# Patient Record
Sex: FEMALE | Race: BLACK OR AFRICAN AMERICAN | Employment: OTHER | URBAN - METROPOLITAN AREA
[De-identification: names, ages, dates, MRNs, and addresses within clinical notes are randomized per-mention and may not be internally consistent; named-entity substitution may affect disease eponyms.]

---

## 2017-01-03 ENCOUNTER — OFFICE VISIT (OUTPATIENT)
Dept: ENDOCRINOLOGY CLINIC | Facility: CLINIC | Age: 67
End: 2017-01-03

## 2017-01-03 VITALS
WEIGHT: 268 LBS | HEART RATE: 80 BPM | HEIGHT: 59 IN | RESPIRATION RATE: 18 BRPM | BODY MASS INDEX: 54.03 KG/M2 | TEMPERATURE: 98 F | DIASTOLIC BLOOD PRESSURE: 72 MMHG | SYSTOLIC BLOOD PRESSURE: 114 MMHG

## 2017-01-03 DIAGNOSIS — Z23 NEED FOR TETANUS BOOSTER: ICD-10-CM

## 2017-01-03 DIAGNOSIS — E11.40 TYPE 2 DIABETES MELLITUS WITH DIABETIC NEUROPATHY, UNSPECIFIED LONG TERM INSULIN USE STATUS: Primary | ICD-10-CM

## 2017-01-03 PROCEDURE — 90471 IMMUNIZATION ADMIN: CPT | Performed by: NURSE PRACTITIONER

## 2017-01-03 PROCEDURE — 99214 OFFICE O/P EST MOD 30 MIN: CPT | Performed by: NURSE PRACTITIONER

## 2017-01-03 PROCEDURE — 90715 TDAP VACCINE 7 YRS/> IM: CPT | Performed by: NURSE PRACTITIONER

## 2017-01-03 RX ORDER — GLIMEPIRIDE 1 MG/1
1 TABLET ORAL
Qty: 60 TABLET | Refills: 1 | Status: SHIPPED | OUTPATIENT
Start: 2017-01-03 | End: 2017-02-08

## 2017-01-03 RX ORDER — GLIMEPIRIDE 1 MG/1
TABLET ORAL
Qty: 180 TABLET | Refills: 1 | OUTPATIENT
Start: 2017-01-03

## 2017-01-03 NOTE — PROGRESS NOTES
CC: Patient presents with:  Diabetes: new pt referred by PCP.       HISTORY:  Past Medical History   Diagnosis Date   • DIABETES    • OTHER DISEASES      bronchitis   • Lipid screening 4/25/2012   • Esophageal reflux    • Measles without mention of complica Results  Component Value Date   HGBA1C 9.0* 11/28/2016   * 11/28/2016   A1C 6.1* 02/11/2014       Diabetes Type: 2  Duration:20 years   Current Meds: metformin 1000 mg bid    SE: denies   Failed Meds: none     Overall glucose control:poor A1C at 9% (two) times daily. , Disp: 180 capsule, Rfl: 2  •  Nortriptyline HCl 25 MG Oral Cap, TAKE 1 CAPSULE BY MOUTH EVERY NIGHT AT BEDTIME, Disp: 30 capsule, Rfl: 5  •  Zolpidem Tartrate 5 MG Oral Tab, TAKE 1 TABLET BY MOUTH AT BEDTIME, Disp: 30 tablet, Rfl: 2  • Skin is warm and dry. No rash noted. No erythema. No pallor. No open wounds noted. Psychiatric: Normal mood and affect.      Diabetes foot exam: Bilateral barefoot skin diabetic exam is normal, visualized feet and the appearance is normal.  Bilateral monof Depression screen: up to date     Check glucoses 1 times daily - fasting, premeals and/or bedtime. Call/fax/email glucose logs in 30 days. Return in about 4 weeks (around 1/31/2017) for diabetes.     Pt verbalized understanding and has no further ques

## 2017-01-03 NOTE — TELEPHONE ENCOUNTER
LOV-1/3/17 SC  FOV-2/3/17 SC  LAST RX-1/3/17 needs 90 day supply  LAST LABS-11/28/16 a1c-9.0  PER PROTOCOL-to provider.

## 2017-01-03 NOTE — PATIENT INSTRUCTIONS
Continue metformin twice a day  Start glimiperide 1 mg before breakfast and dinner  Test sugar at varied times and record   Go see [facility] education   Return in 4 weeks with readings

## 2017-01-05 ENCOUNTER — NURSE ONLY (OUTPATIENT)
Dept: ENDOCRINOLOGY CLINIC | Facility: CLINIC | Age: 67
End: 2017-01-05

## 2017-01-05 VITALS
DIASTOLIC BLOOD PRESSURE: 91 MMHG | HEART RATE: 92 BPM | WEIGHT: 272 LBS | SYSTOLIC BLOOD PRESSURE: 149 MMHG | BODY MASS INDEX: 55 KG/M2

## 2017-01-05 DIAGNOSIS — E11.40 TYPE 2 DIABETES MELLITUS WITH DIABETIC NEUROPATHY, UNSPECIFIED LONG TERM INSULIN USE STATUS: Primary | ICD-10-CM

## 2017-01-05 PROCEDURE — G0108 DIAB MANAGE TRN  PER INDIV: HCPCS | Performed by: DIETITIAN, REGISTERED

## 2017-01-05 NOTE — PROGRESS NOTES
Harjinder Valdez  : 1950 attended [employer] Diabetic Education:    Date: 2017  Referring MD: BISHNU Sweeney N.P. Start time: 11am End time: 12pm    Date of Dx: 15 yrs ago     /91 mmHg  Pulse 92  Wt 272 lb      HEMOGLOBIN A1C (% of total Hgb) pancreas with increased potential for hypoglycemia.  If dose missed, wait to take with next meal.    Monitoring: Instructed/reinforced blood glucose monitoring, testing schedules and target goals:   Fasting / Pre-meal  2 Hour Post-prandial 140-180  Elton Alessandra

## 2017-01-09 RX ORDER — ACETAMINOPHEN AND CODEINE PHOSPHATE 300; 30 MG/1; MG/1
TABLET ORAL
Qty: 50 TABLET | Refills: 0 | OUTPATIENT
Start: 2017-01-09

## 2017-01-16 ENCOUNTER — TELEPHONE (OUTPATIENT)
Dept: INTERNAL MEDICINE CLINIC | Facility: CLINIC | Age: 67
End: 2017-01-16

## 2017-01-16 RX ORDER — ACETAMINOPHEN AND CODEINE PHOSPHATE 300; 30 MG/1; MG/1
TABLET ORAL
Qty: 50 TABLET | Refills: 0 | Status: SHIPPED | OUTPATIENT
Start: 2017-01-16 | End: 2017-03-31

## 2017-01-18 RX ORDER — DICYCLOMINE HYDROCHLORIDE 10 MG/1
CAPSULE ORAL
Qty: 120 CAPSULE | Refills: 0 | Status: SHIPPED | OUTPATIENT
Start: 2017-01-18 | End: 2017-03-20

## 2017-02-02 ENCOUNTER — TELEPHONE (OUTPATIENT)
Dept: ENDOCRINOLOGY CLINIC | Facility: CLINIC | Age: 67
End: 2017-02-02

## 2017-02-08 DIAGNOSIS — E11.42 TYPE 2 DIABETES MELLITUS WITH DIABETIC POLYNEUROPATHY, UNSPECIFIED LONG TERM INSULIN USE STATUS: Primary | ICD-10-CM

## 2017-02-08 NOTE — TELEPHONE ENCOUNTER
LOV-1/3/17 SC  FOV-2/13/17 SC  LAST RX-1/3/17 60 tabs 1 refill  LAST LABS-11/28/16 a1c-9.0  PER PROTOCOL-to provider.

## 2017-02-09 RX ORDER — GLIMEPIRIDE 1 MG/1
TABLET ORAL
Qty: 180 TABLET | Refills: 1 | Status: SHIPPED | OUTPATIENT
Start: 2017-02-09 | End: 2017-02-24

## 2017-02-16 ENCOUNTER — TELEPHONE (OUTPATIENT)
Dept: INTERNAL MEDICINE CLINIC | Facility: CLINIC | Age: 67
End: 2017-02-16

## 2017-02-16 RX ORDER — PRAVASTATIN SODIUM 20 MG
TABLET ORAL
Qty: 90 TABLET | Refills: 0 | Status: SHIPPED | OUTPATIENT
Start: 2017-02-16 | End: 2017-05-21

## 2017-02-16 RX ORDER — SPIRONOLACTONE 25 MG/1
TABLET ORAL
Qty: 90 TABLET | Refills: 0 | Status: SHIPPED | OUTPATIENT
Start: 2017-02-16 | End: 2017-05-21

## 2017-02-24 ENCOUNTER — OFFICE VISIT (OUTPATIENT)
Dept: ENDOCRINOLOGY CLINIC | Facility: CLINIC | Age: 67
End: 2017-02-24

## 2017-02-24 VITALS
TEMPERATURE: 98 F | HEART RATE: 68 BPM | HEIGHT: 59 IN | RESPIRATION RATE: 18 BRPM | DIASTOLIC BLOOD PRESSURE: 82 MMHG | WEIGHT: 268 LBS | BODY MASS INDEX: 54.03 KG/M2 | SYSTOLIC BLOOD PRESSURE: 124 MMHG

## 2017-02-24 DIAGNOSIS — E11.42 TYPE 2 DIABETES MELLITUS WITH DIABETIC POLYNEUROPATHY, UNSPECIFIED LONG TERM INSULIN USE STATUS: Primary | ICD-10-CM

## 2017-02-24 PROCEDURE — 99214 OFFICE O/P EST MOD 30 MIN: CPT | Performed by: NURSE PRACTITIONER

## 2017-02-24 RX ORDER — LANCETS 33 GAUGE
EACH MISCELLANEOUS
Qty: 100 EACH | Refills: 3 | Status: SHIPPED | OUTPATIENT
Start: 2017-02-24 | End: 2018-04-16

## 2017-02-24 NOTE — PATIENT INSTRUCTIONS
Start Victoza 0.6 mg daily for 2 weeks then increase to 1.2 mg for 2 weeks   When you go to 1.2 mg stop glimiperide   Continue metformin twice a day   Check sugar 1-2 times daily and bring in readings at next visit in 1 month

## 2017-02-25 NOTE — PROGRESS NOTES
CC: Patient presents with:  Diabetes: follow up. pt did not bring meter.       HISTORY:  Past Medical History   Diagnosis Date   • DIABETES    • OTHER DISEASES      bronchitis   • Lipid screening 4/25/2012   • Esophageal reflux    • Measles without mention years.   Diabetes information        Lab Results  Component Value Date   HGBA1C 9.0* 11/28/2016   * 11/28/2016   A1C 6.1* 02/11/2014       Diabetes Type: 2  Duration:20 years   Current Meds: metformin 1000 mg bid, glimepiride 1 mg bid    SE: amrik tablet, Rfl: 0  •  PRAVASTATIN SODIUM 20 MG Oral Tab, TAKE 1 TABLET BY MOUTH EVERY EVENING, Disp: 90 tablet, Rfl: 0  •  DICYCLOMINE HCL 10 MG Oral Cap, TAKE 1 CAPSULE BY MOUTH FOUR TIMES DAILY BEFORE MEALS AND AT BEDTIME, Disp: 120 capsule, Rfl: 0  •  Acet Plan:  Type 2 diabetes mellitus with diabetic neuropathy, unspecified long term insulin use status (hcc)  (primary encounter diagnosis): plan continue metformin 1000 mg bid, change to glimiperide 1 mg daily.  Trial of Victoza 0.6 mg daily for 2 weeks and in

## 2017-02-27 RX ORDER — ZOLPIDEM TARTRATE 5 MG/1
TABLET ORAL
Qty: 30 TABLET | Refills: 0 | Status: SHIPPED | OUTPATIENT
Start: 2017-02-27 | End: 2017-03-31

## 2017-03-04 ENCOUNTER — MED REC SCAN ONLY (OUTPATIENT)
Dept: INTERNAL MEDICINE CLINIC | Facility: CLINIC | Age: 67
End: 2017-03-04

## 2017-03-13 ENCOUNTER — OFFICE VISIT (OUTPATIENT)
Dept: ENDOCRINOLOGY CLINIC | Facility: CLINIC | Age: 67
End: 2017-03-13

## 2017-03-13 VITALS
TEMPERATURE: 98 F | HEART RATE: 88 BPM | SYSTOLIC BLOOD PRESSURE: 118 MMHG | BODY MASS INDEX: 52.61 KG/M2 | DIASTOLIC BLOOD PRESSURE: 72 MMHG | RESPIRATION RATE: 18 BRPM | WEIGHT: 268 LBS | HEIGHT: 60 IN

## 2017-03-13 DIAGNOSIS — E78.00 PURE HYPERCHOLESTEROLEMIA: ICD-10-CM

## 2017-03-13 DIAGNOSIS — E11.42 TYPE 2 DIABETES MELLITUS WITH DIABETIC POLYNEUROPATHY, UNSPECIFIED LONG TERM INSULIN USE STATUS: Primary | ICD-10-CM

## 2017-03-13 LAB
CARTRIDGE LOT#: 673 NUMERIC
HEMOGLOBIN A1C: 7.2 % (ref 4.3–5.6)

## 2017-03-13 PROCEDURE — 83036 HEMOGLOBIN GLYCOSYLATED A1C: CPT | Performed by: NURSE PRACTITIONER

## 2017-03-13 PROCEDURE — 36415 COLL VENOUS BLD VENIPUNCTURE: CPT | Performed by: NURSE PRACTITIONER

## 2017-03-13 PROCEDURE — 99214 OFFICE O/P EST MOD 30 MIN: CPT | Performed by: NURSE PRACTITIONER

## 2017-03-13 NOTE — PROGRESS NOTES
CC: Patient presents with:  Diabetes: follow up. pt did bring meter. HPI:   HPI: 73year old y/o female who presents for follow up diabetes visit.  Has been taking Victoza 1.2 mg for past few weeks, has had decrease in appetite and seen BG improvement is into the skin daily. , Disp: 1 Box, Rfl: 0  •  Liraglutide (VICTOZA) 18 MG/3ML Subcutaneous Solution Pen-injector, Inject under the skin 0.6 mg daily for 2 weeks and then go to 1.2 mg daily for 2 weeks, Disp: 6 mL, Rfl: 0  •  ONETOUCH ULTRA BLUE In [employer] Normal range of motion. Neck supple. Cardiovascular: Normal rate, regular rhythm and intact distal pulses. No murmur, rubs or gallops. Pulmonary/Chest: Effort and breath sounds normal. No respiratory distress.  No wheezes,  rhonchi or rales   Abdominal: bedtime. Call/fax/email glucose logs or f/u in 3 months      Return in about 3 months (around 6/13/2017) for diabetes. Pt verbalized understanding and has no further questions at this time.     Bertha ELIZABETH,LOUISE

## 2017-03-21 RX ORDER — DICYCLOMINE HYDROCHLORIDE 10 MG/1
CAPSULE ORAL
Qty: 120 CAPSULE | Refills: 0 | Status: SHIPPED | OUTPATIENT
Start: 2017-03-21 | End: 2017-05-20

## 2017-03-31 RX ORDER — ZOLPIDEM TARTRATE 5 MG/1
TABLET ORAL
Qty: 30 TABLET | Refills: 0 | Status: SHIPPED | OUTPATIENT
Start: 2017-03-31 | End: 2017-05-05

## 2017-03-31 RX ORDER — ACETAMINOPHEN AND CODEINE PHOSPHATE 300; 30 MG/1; MG/1
TABLET ORAL
Qty: 50 TABLET | Refills: 0 | Status: SHIPPED | OUTPATIENT
Start: 2017-03-31 | End: 2017-06-16

## 2017-05-08 RX ORDER — ZOLPIDEM TARTRATE 5 MG/1
TABLET ORAL
Qty: 30 TABLET | Refills: 0 | Status: SHIPPED | OUTPATIENT
Start: 2017-05-08 | End: 2017-06-05

## 2017-05-15 RX ORDER — OMEPRAZOLE 20 MG/1
CAPSULE, DELAYED RELEASE ORAL
Qty: 90 CAPSULE | Refills: 0 | Status: SHIPPED | OUTPATIENT
Start: 2017-05-15 | End: 2017-08-06

## 2017-05-22 RX ORDER — SPIRONOLACTONE 25 MG/1
TABLET ORAL
Qty: 90 TABLET | Refills: 0 | Status: SHIPPED | OUTPATIENT
Start: 2017-05-22 | End: 2017-08-17

## 2017-05-22 RX ORDER — DICYCLOMINE HYDROCHLORIDE 10 MG/1
CAPSULE ORAL
Qty: 120 CAPSULE | Refills: 0 | Status: SHIPPED | OUTPATIENT
Start: 2017-05-22 | End: 2017-07-22

## 2017-05-22 RX ORDER — PRAVASTATIN SODIUM 20 MG
TABLET ORAL
Qty: 90 TABLET | Refills: 0 | Status: SHIPPED | OUTPATIENT
Start: 2017-05-22 | End: 2017-08-17

## 2017-06-02 RX ORDER — NORTRIPTYLINE HYDROCHLORIDE 25 MG/1
CAPSULE ORAL
Qty: 30 CAPSULE | Refills: 2 | Status: SHIPPED | OUTPATIENT
Start: 2017-06-02 | End: 2017-08-26

## 2017-06-05 RX ORDER — ZOLPIDEM TARTRATE 5 MG/1
TABLET ORAL
Qty: 30 TABLET | Refills: 0 | Status: SHIPPED | OUTPATIENT
Start: 2017-06-05 | End: 2017-07-06

## 2017-06-07 ENCOUNTER — APPOINTMENT (OUTPATIENT)
Dept: LAB | Age: 67
End: 2017-06-07
Attending: NURSE PRACTITIONER
Payer: MEDICARE

## 2017-06-07 DIAGNOSIS — E78.00 PURE HYPERCHOLESTEROLEMIA: ICD-10-CM

## 2017-06-07 DIAGNOSIS — E11.42 TYPE 2 DIABETES MELLITUS WITH DIABETIC POLYNEUROPATHY, UNSPECIFIED LONG TERM INSULIN USE STATUS: ICD-10-CM

## 2017-06-07 PROCEDURE — 80061 LIPID PANEL: CPT

## 2017-06-07 PROCEDURE — 82043 UR ALBUMIN QUANTITATIVE: CPT

## 2017-06-07 PROCEDURE — 82570 ASSAY OF URINE CREATININE: CPT

## 2017-06-07 PROCEDURE — 36415 COLL VENOUS BLD VENIPUNCTURE: CPT

## 2017-06-07 PROCEDURE — 80053 COMPREHEN METABOLIC PANEL: CPT

## 2017-06-12 ENCOUNTER — OFFICE VISIT (OUTPATIENT)
Dept: ENDOCRINOLOGY CLINIC | Facility: CLINIC | Age: 67
End: 2017-06-12

## 2017-06-12 VITALS
WEIGHT: 274 LBS | BODY MASS INDEX: 55.24 KG/M2 | DIASTOLIC BLOOD PRESSURE: 64 MMHG | TEMPERATURE: 98 F | HEART RATE: 84 BPM | SYSTOLIC BLOOD PRESSURE: 120 MMHG | RESPIRATION RATE: 18 BRPM | HEIGHT: 59 IN

## 2017-06-12 DIAGNOSIS — E11.42 TYPE 2 DIABETES MELLITUS WITH DIABETIC POLYNEUROPATHY, WITHOUT LONG-TERM CURRENT USE OF INSULIN (HCC): Primary | ICD-10-CM

## 2017-06-12 PROCEDURE — 99214 OFFICE O/P EST MOD 30 MIN: CPT | Performed by: NURSE PRACTITIONER

## 2017-06-12 PROCEDURE — 83036 HEMOGLOBIN GLYCOSYLATED A1C: CPT | Performed by: NURSE PRACTITIONER

## 2017-06-12 NOTE — PROGRESS NOTES
CC: Patient presents with:  Diabetes: follow up. pt did bring meter. HPI:   HPI: 73year old y/o female who presents for follow up diabetes visit. Has stopped  Victoza and testing BG for the most part, cares for grandson that had brain injury.  Weight up prescriptions:   •  MetFORMIN HCl 1000 MG Oral Tab, TAKE 1 TABLET(1000 MG) BY MOUTH TWICE DAILY WITH MEALS, Disp: 180 tablet, Rfl: 1  •  ZOLPIDEM TARTRATE 5 MG Oral Tab, TAKE 1 TABLET BY MOUTH EVERY NIGHT AT BEDTIME, Disp: 30 tablet, Rfl: 0  •  NORTRIPTYLI Normal range of motion. Neck supple. Cardiovascular: Normal rate, regular rhythm and intact distal pulses. No murmur, rubs or gallops. Pulmonary/Chest: Effort and breath sounds normal. No respiratory distress.  No wheezes,  rhonchi or rales   Abdominal:

## 2017-06-16 ENCOUNTER — OFFICE VISIT (OUTPATIENT)
Dept: INTERNAL MEDICINE CLINIC | Facility: CLINIC | Age: 67
End: 2017-06-16

## 2017-06-16 VITALS
RESPIRATION RATE: 16 BRPM | SYSTOLIC BLOOD PRESSURE: 130 MMHG | WEIGHT: 275.5 LBS | OXYGEN SATURATION: 93 % | DIASTOLIC BLOOD PRESSURE: 80 MMHG | HEIGHT: 59 IN | TEMPERATURE: 98 F | HEART RATE: 84 BPM | BODY MASS INDEX: 55.54 KG/M2

## 2017-06-16 DIAGNOSIS — Z13.31 DEPRESSION SCREENING: ICD-10-CM

## 2017-06-16 DIAGNOSIS — E11.42 TYPE 2 DIABETES MELLITUS WITH DIABETIC POLYNEUROPATHY, WITHOUT LONG-TERM CURRENT USE OF INSULIN (HCC): ICD-10-CM

## 2017-06-16 DIAGNOSIS — Z00.00 ENCOUNTER FOR ANNUAL HEALTH EXAMINATION: ICD-10-CM

## 2017-06-16 DIAGNOSIS — K29.90 GASTRITIS AND GASTRODUODENITIS: ICD-10-CM

## 2017-06-16 DIAGNOSIS — E11.42 DIABETIC POLYNEUROPATHY ASSOCIATED WITH TYPE 2 DIABETES MELLITUS (HCC): ICD-10-CM

## 2017-06-16 DIAGNOSIS — I10 ESSENTIAL HYPERTENSION, BENIGN: ICD-10-CM

## 2017-06-16 DIAGNOSIS — E66.01 MORBID OBESITY DUE TO EXCESS CALORIES (HCC): ICD-10-CM

## 2017-06-16 DIAGNOSIS — Z12.31 VISIT FOR SCREENING MAMMOGRAM: ICD-10-CM

## 2017-06-16 DIAGNOSIS — E78.00 PURE HYPERCHOLESTEROLEMIA: ICD-10-CM

## 2017-06-16 DIAGNOSIS — M96.1 POSTLAMINECTOMY SYNDROME, LUMBAR REGION: ICD-10-CM

## 2017-06-16 DIAGNOSIS — K29.70 GASTRITIS AND GASTRODUODENITIS: ICD-10-CM

## 2017-06-16 DIAGNOSIS — Z00.00 MEDICARE ANNUAL WELLNESS VISIT, SUBSEQUENT: Primary | ICD-10-CM

## 2017-06-16 PROCEDURE — G0439 PPPS, SUBSEQ VISIT: HCPCS | Performed by: FAMILY MEDICINE

## 2017-06-16 PROCEDURE — 99214 OFFICE O/P EST MOD 30 MIN: CPT | Performed by: FAMILY MEDICINE

## 2017-06-16 PROCEDURE — G0444 DEPRESSION SCREEN ANNUAL: HCPCS | Performed by: FAMILY MEDICINE

## 2017-06-16 RX ORDER — ACETAMINOPHEN AND CODEINE PHOSPHATE 300; 30 MG/1; MG/1
TABLET ORAL
Qty: 50 TABLET | Refills: 0 | Status: SHIPPED | OUTPATIENT
Start: 2017-06-16 | End: 2017-07-22

## 2017-06-16 NOTE — PROGRESS NOTES
HPI:   Bettyjane Denver is a 73year old female who presents for a Medicare Subsequent Annual Wellness visit (Pt already had Initial Annual Wellness).       Her last annual assessment has been over 1 year: Annual Physical due on 05/04/2017         Patient Oral Tab TAKE 1 TABLET BY MOUTH EVERY EVENING   OMEPRAZOLE 20 MG Oral Capsule Delayed Release TAKE 1 CAPSULE(20 MG) BY MOUTH DAILY   ACETAMINOPHEN-CODEINE #3 300-30 MG Oral Tab TAKE 1 TABLET BY MOUTH EVERY 4 HOURS AS NEEDED FOR PAIN   Insulin Pen Needle (B smoking use included Cigarettes. She has a 3.6 pack-year smoking history. She has never used smokeless tobacco. She reports that she does not drink alcohol or use illicit drugs.      REVIEW OF SYSTEMS:   GENERAL: feels well otherwise  SKIN: denies any unusu Back:   Symmetric, no curvature, ROM normal, no CVA tenderness   Lungs:   Clear to auscultation bilaterally, respirations unlabored   Heart:  Regular rate and rhythm, S1 and S2 normal, no murmur, rub, or gallop   Abdomen:   Soft, non-tender, bowel sounds file in Epic:    Brittanie Muñoz does not have a Power of  for [employer] on file in [address]  Discussed with patient and provided information          PLAN:  (Z00.00) Medicare annual wellness visit, subsequent  (primary encounter diagnosis)  Plan: EDGAR medications as prescribed: Able without help    Are you able to afford your medications?: Yes    Hearing Problems?: No     Functional Status     Hearing Problems?: No    Vision Problems? : Yes    Difficulty walking?: Yes (back pain )    Difficulty dressing (FSB)Annually   GLUCOSE (mg/dL)   Date Value   06/07/2017 155*   04/07/2014 99   02/11/2014 133*   09/14/2011 139*   ----------       Cardiovascular Disease Screening     LDL Annually LDL CHOLESTEROL (mg/dL)   Date Value   06/07/2017 53     LDL-CHOLESTEROL found Please get once after your 65th birthday    Hepatitis B for Moderate/High Risk No vaccine history found Medium/high risk factors:   End-stage renal disease   Hemophiliacs who received Factor VIII or IX concentrates   Clients of institutions for the  (mg/dL (calc))   Date Value   09/14/2011 85     LDL CHOLESTROL (mg/dL)   Date Value   02/11/2014 58    No flowsheet data found. Dilated Eye exam  Annually Data entered on: 10/19/2016   Last Dilated Eye Exam 10/19/2016     No flowsheet data found.     CO

## 2017-06-16 NOTE — PATIENT INSTRUCTIONS
Gianni Velez's SCREENING SCHEDULE   Tests on this list are recommended by your physician but may not be covered, or covered at this frequency, by your insurer. Please check with your insurance carrier before scheduling to verify coverage.    JEREMIE Abdominal aortic aneurysm screening (once between ages 73-68) IPPE only No results found for this or any previous visit.  Limited to patients who meet one of the following criteria:   • Men who are 73-68 years old and have smoked more than 100 cigarettes in data found.     Screening Mammogram      Mammogram    Recommend Annually to at least age 76, and as needed after 76 Mammogram,1 Yr due on 06/18/2017 Please get this Mammogram regularly   Immunizations      Influenza  Covered Annually   Orders placed or perf information packet, including necessary form from the [facility]. [email or web address]  A link to the [employer].  This site has a lot of good information including definition

## 2017-07-01 RX ORDER — NORTRIPTYLINE HYDROCHLORIDE 25 MG/1
CAPSULE ORAL
Qty: 90 CAPSULE | Refills: 1 | OUTPATIENT
Start: 2017-07-01

## 2017-07-11 ENCOUNTER — OFFICE VISIT (OUTPATIENT)
Dept: ENDOCRINOLOGY CLINIC | Facility: CLINIC | Age: 67
End: 2017-07-11

## 2017-07-11 VITALS
SYSTOLIC BLOOD PRESSURE: 110 MMHG | RESPIRATION RATE: 18 BRPM | DIASTOLIC BLOOD PRESSURE: 70 MMHG | WEIGHT: 267 LBS | BODY MASS INDEX: 52.42 KG/M2 | TEMPERATURE: 98 F | HEART RATE: 72 BPM | HEIGHT: 60 IN

## 2017-07-11 DIAGNOSIS — E11.42 TYPE 2 DIABETES MELLITUS WITH DIABETIC POLYNEUROPATHY, UNSPECIFIED LONG TERM INSULIN USE STATUS: ICD-10-CM

## 2017-07-11 PROCEDURE — 99213 OFFICE O/P EST LOW 20 MIN: CPT | Performed by: NURSE PRACTITIONER

## 2017-07-11 RX ORDER — GLIMEPIRIDE 1 MG/1
TABLET ORAL
Refills: 1 | COMMUNITY
Start: 2017-05-16 | End: 2017-07-11

## 2017-07-11 RX ORDER — ZOLPIDEM TARTRATE 5 MG/1
TABLET ORAL
Qty: 30 TABLET | Refills: 0 | Status: SHIPPED | COMMUNITY
Start: 2017-07-11 | End: 2017-07-12

## 2017-07-11 NOTE — PATIENT INSTRUCTIONS
Continue metformin and Victoza 1.8 mg daily   Continue testing sugar, try to vary times   If you can, increase physical activity  You are doing GREAT! Return in 1 month with readings.

## 2017-07-11 NOTE — PROGRESS NOTES
CC: Patient presents with:  Diabetes: follow up. pt did bring meter. HPI:   HPI: 73year old y/o female who presents for follow up diabetes visit. Has resumed Victoza and is back to full dose. Has lost 8 lb since lov. Also is testing BG daily. States mario under the skin 1.8 mg daily, Disp: 6 mL, Rfl: 2  •  MetFORMIN HCl 1000 MG Oral Tab, TAKE 1 TABLET(1000 MG) BY MOUTH TWICE DAILY WITH MEALS, Disp: 180 tablet, Rfl: 1  •  NORTRIPTYLINE HCL 25 MG Oral Cap, TAKE 1 CAPSULE BY MOUTH EVERY NIGHT AT BEDTIME, Disp: rales   Abdominal: Soft. Bowel sounds are normal. Non tender, no masses, no lipodystrophy. Skin: Skin is warm and dry. No rash noted. No erythema. No pallor. No open wounds noted. Psychiatric: Normal mood and affect.      Assessment and Plan:  Type 2 diab

## 2017-07-12 ENCOUNTER — TELEPHONE (OUTPATIENT)
Dept: INTERNAL MEDICINE CLINIC | Facility: CLINIC | Age: 67
End: 2017-07-12

## 2017-07-12 RX ORDER — ZOLPIDEM TARTRATE 5 MG/1
TABLET ORAL
Qty: 30 TABLET | Refills: 0 | COMMUNITY
Start: 2017-07-12 | End: 2017-08-10

## 2017-07-17 ENCOUNTER — TELEPHONE (OUTPATIENT)
Dept: INTERNAL MEDICINE CLINIC | Facility: CLINIC | Age: 67
End: 2017-07-17

## 2017-07-24 ENCOUNTER — TELEPHONE (OUTPATIENT)
Dept: INTERNAL MEDICINE CLINIC | Facility: CLINIC | Age: 67
End: 2017-07-24

## 2017-07-24 RX ORDER — DICYCLOMINE HYDROCHLORIDE 10 MG/1
CAPSULE ORAL
Qty: 120 CAPSULE | Refills: 0 | Status: SHIPPED | OUTPATIENT
Start: 2017-07-24 | End: 2017-09-21

## 2017-07-24 RX ORDER — ACETAMINOPHEN AND CODEINE PHOSPHATE 300; 30 MG/1; MG/1
TABLET ORAL
Qty: 50 TABLET | Refills: 0 | Status: SHIPPED | OUTPATIENT
Start: 2017-07-24 | End: 2017-09-18

## 2017-08-07 RX ORDER — OMEPRAZOLE 20 MG/1
CAPSULE, DELAYED RELEASE ORAL
Qty: 90 CAPSULE | Refills: 0 | Status: SHIPPED | OUTPATIENT
Start: 2017-08-07 | End: 2017-10-27

## 2017-08-08 ENCOUNTER — OFFICE VISIT (OUTPATIENT)
Dept: ENDOCRINOLOGY CLINIC | Facility: CLINIC | Age: 67
End: 2017-08-08

## 2017-08-08 VITALS
WEIGHT: 273 LBS | SYSTOLIC BLOOD PRESSURE: 122 MMHG | TEMPERATURE: 98 F | DIASTOLIC BLOOD PRESSURE: 74 MMHG | HEIGHT: 60 IN | RESPIRATION RATE: 18 BRPM | BODY MASS INDEX: 53.6 KG/M2 | HEART RATE: 72 BPM

## 2017-08-08 DIAGNOSIS — E11.42 TYPE 2 DIABETES MELLITUS WITH DIABETIC POLYNEUROPATHY, UNSPECIFIED LONG TERM INSULIN USE STATUS: ICD-10-CM

## 2017-08-08 PROCEDURE — 99213 OFFICE O/P EST LOW 20 MIN: CPT | Performed by: NURSE PRACTITIONER

## 2017-08-09 NOTE — PROGRESS NOTES
CC: Patient presents with:  Diabetes: follow up. pt did bring meter. HPI:   HPI: 78year old y/o female who presents for follow up diabetes visit. Has resumed Victoza and is back to full dose. Has gained 6 lb. Also is still testing BG daily.     Diabetes capsule, Rfl: 0  •  ACETAMINOPHEN-CODEINE #3 300-30 MG Oral Tab, TAKE 1 TABLET BY MOUTH EVERY 4 HOURS AS NEEDED FOR PAIN, Disp: 50 tablet, Rfl: 0  •  DICYCLOMINE HCL 10 MG Oral Cap, TAKE 1 CAPSULE BY MOUTH FOUR TIMES DAILY BEFORE MEALS AND AT BEDTIME, Disp normal. Non tender, no masses, no lipodystrophy. Skin: Skin is warm and dry. No rash noted. No erythema. No pallor. No open wounds noted. Psychiatric: Normal mood and affect.      Assessment and Plan:  Type 2 diabetes mellitus with diabetic polyneuropathy

## 2017-08-10 ENCOUNTER — TELEPHONE (OUTPATIENT)
Dept: INTERNAL MEDICINE CLINIC | Facility: CLINIC | Age: 67
End: 2017-08-10

## 2017-08-10 RX ORDER — ZOLPIDEM TARTRATE 5 MG/1
TABLET ORAL
Qty: 30 TABLET | Refills: 0 | Status: SHIPPED | OUTPATIENT
Start: 2017-08-10 | End: 2017-09-13

## 2017-08-17 RX ORDER — PRAVASTATIN SODIUM 20 MG
TABLET ORAL
Qty: 90 TABLET | Refills: 0 | Status: SHIPPED | OUTPATIENT
Start: 2017-08-17 | End: 2017-11-14

## 2017-08-17 RX ORDER — SPIRONOLACTONE 25 MG/1
TABLET ORAL
Qty: 90 TABLET | Refills: 0 | Status: SHIPPED | OUTPATIENT
Start: 2017-08-17 | End: 2017-11-14

## 2017-08-26 ENCOUNTER — PATIENT OUTREACH (OUTPATIENT)
Dept: CASE MANAGEMENT | Age: 67
End: 2017-08-26

## 2017-08-26 NOTE — PROGRESS NOTES
Spoke with Pt to intro CCM. Pt stated at this time not interested, but we can send info on the program. Please send info to Pt.

## 2017-08-28 RX ORDER — NORTRIPTYLINE HYDROCHLORIDE 25 MG/1
CAPSULE ORAL
Qty: 30 CAPSULE | Refills: 3 | Status: SHIPPED | OUTPATIENT
Start: 2017-08-28 | End: 2017-12-29

## 2017-09-13 ENCOUNTER — TELEPHONE (OUTPATIENT)
Dept: INTERNAL MEDICINE CLINIC | Facility: CLINIC | Age: 67
End: 2017-09-13

## 2017-09-13 RX ORDER — ZOLPIDEM TARTRATE 5 MG/1
TABLET ORAL
Qty: 30 TABLET | Refills: 1 | COMMUNITY
Start: 2017-09-13 | End: 2017-11-14

## 2017-09-18 RX ORDER — ACETAMINOPHEN AND CODEINE PHOSPHATE 300; 30 MG/1; MG/1
TABLET ORAL
Qty: 50 TABLET | Refills: 0 | Status: SHIPPED | OUTPATIENT
Start: 2017-09-18 | End: 2017-10-27

## 2017-09-21 RX ORDER — DICYCLOMINE HYDROCHLORIDE 10 MG/1
CAPSULE ORAL
Qty: 120 CAPSULE | Refills: 0 | Status: SHIPPED | OUTPATIENT
Start: 2017-09-21 | End: 2017-11-19

## 2017-10-03 ENCOUNTER — PATIENT OUTREACH (OUTPATIENT)
Dept: CASE MANAGEMENT | Age: 67
End: 2017-10-03

## 2017-10-03 NOTE — PROGRESS NOTES
Contacted Pt to intro to CCM program. Pt wants to discuss the program further with her Dr. Also requested info to be sent. Please send Pt info on program and f/u after Dr. Reyes Gandhi on 12/04/17.

## 2017-10-06 ENCOUNTER — IMMUNIZATION (OUTPATIENT)
Dept: INTERNAL MEDICINE CLINIC | Facility: CLINIC | Age: 67
End: 2017-10-06

## 2017-10-06 DIAGNOSIS — Z23 NEED FOR VACCINATION: ICD-10-CM

## 2017-10-06 PROCEDURE — 90686 IIV4 VACC NO PRSV 0.5 ML IM: CPT | Performed by: FAMILY MEDICINE

## 2017-10-06 PROCEDURE — G0008 ADMIN INFLUENZA VIRUS VAC: HCPCS | Performed by: FAMILY MEDICINE

## 2017-10-10 ENCOUNTER — TELEPHONE (OUTPATIENT)
Dept: INTERNAL MEDICINE CLINIC | Facility: CLINIC | Age: 67
End: 2017-10-10

## 2017-10-10 NOTE — TELEPHONE ENCOUNTER
Forms have been filled out and given to [facility] for [facility] to sign and review

## 2017-10-16 ENCOUNTER — TELEPHONE (OUTPATIENT)
Dept: INTERNAL MEDICINE CLINIC | Facility: CLINIC | Age: 67
End: 2017-10-16

## 2017-10-27 RX ORDER — ACETAMINOPHEN AND CODEINE PHOSPHATE 300; 30 MG/1; MG/1
TABLET ORAL
Qty: 50 TABLET | Refills: 0 | Status: SHIPPED | OUTPATIENT
Start: 2017-10-27 | End: 2017-12-15

## 2017-10-30 RX ORDER — OMEPRAZOLE 20 MG/1
CAPSULE, DELAYED RELEASE ORAL
Qty: 90 CAPSULE | Refills: 0 | Status: SHIPPED | OUTPATIENT
Start: 2017-10-30 | End: 2018-01-29

## 2017-11-04 ENCOUNTER — HOSPITAL ENCOUNTER (OUTPATIENT)
Dept: MAMMOGRAPHY | Age: 67
Discharge: HOME | End: 2017-11-04
Attending: FAMILY MEDICINE
Payer: MEDICARE

## 2017-11-04 DIAGNOSIS — Z12.31 VISIT FOR SCREENING MAMMOGRAM: ICD-10-CM

## 2017-11-04 PROCEDURE — 77067 SCR MAMMO BI INCL CAD: CPT | Performed by: FAMILY MEDICINE

## 2017-11-05 DIAGNOSIS — E11.42 TYPE 2 DIABETES MELLITUS WITH DIABETIC POLYNEUROPATHY, UNSPECIFIED LONG TERM INSULIN USE STATUS: ICD-10-CM

## 2017-11-06 RX ORDER — LIRAGLUTIDE 6 MG/ML
INJECTION SUBCUTANEOUS
Qty: 9 ML | Refills: 0 | Status: SHIPPED | OUTPATIENT
Start: 2017-11-06 | End: 2017-12-08

## 2017-11-14 RX ORDER — SPIRONOLACTONE 25 MG/1
TABLET ORAL
Qty: 90 TABLET | Refills: 0 | Status: SHIPPED | OUTPATIENT
Start: 2017-11-14 | End: 2018-02-12

## 2017-11-14 RX ORDER — ZOLPIDEM TARTRATE 5 MG/1
TABLET ORAL
Qty: 30 TABLET | Refills: 0 | Status: SHIPPED | OUTPATIENT
Start: 2017-11-14 | End: 2017-12-22

## 2017-11-14 RX ORDER — PRAVASTATIN SODIUM 20 MG
TABLET ORAL
Qty: 90 TABLET | Refills: 0 | Status: SHIPPED | OUTPATIENT
Start: 2017-11-14 | End: 2018-02-20

## 2017-11-22 RX ORDER — DICYCLOMINE HYDROCHLORIDE 10 MG/1
CAPSULE ORAL
Qty: 120 CAPSULE | Refills: 0 | Status: SHIPPED | OUTPATIENT
Start: 2017-11-22 | End: 2018-11-23

## 2017-12-04 ENCOUNTER — LAB ENCOUNTER (OUTPATIENT)
Dept: LAB | Age: 67
End: 2017-12-04
Attending: FAMILY MEDICINE
Payer: MEDICARE

## 2017-12-04 ENCOUNTER — OFFICE VISIT (OUTPATIENT)
Dept: INTERNAL MEDICINE CLINIC | Facility: CLINIC | Age: 67
End: 2017-12-04

## 2017-12-04 VITALS
OXYGEN SATURATION: 96 % | WEIGHT: 273.5 LBS | BODY MASS INDEX: 55.14 KG/M2 | TEMPERATURE: 98 F | HEIGHT: 59 IN | RESPIRATION RATE: 16 BRPM | DIASTOLIC BLOOD PRESSURE: 80 MMHG | HEART RATE: 90 BPM | SYSTOLIC BLOOD PRESSURE: 134 MMHG

## 2017-12-04 DIAGNOSIS — Z11.59 NEED FOR HEPATITIS C SCREENING TEST: ICD-10-CM

## 2017-12-04 DIAGNOSIS — E66.01 MORBID OBESITY DUE TO EXCESS CALORIES (HCC): ICD-10-CM

## 2017-12-04 DIAGNOSIS — E78.00 PURE HYPERCHOLESTEROLEMIA: ICD-10-CM

## 2017-12-04 DIAGNOSIS — E11.42 TYPE 2 DIABETES MELLITUS WITH DIABETIC POLYNEUROPATHY, WITHOUT LONG-TERM CURRENT USE OF INSULIN (HCC): ICD-10-CM

## 2017-12-04 DIAGNOSIS — I10 ESSENTIAL HYPERTENSION, BENIGN: Primary | ICD-10-CM

## 2017-12-04 DIAGNOSIS — E11.42 TYPE 2 DIABETES MELLITUS WITH DIABETIC POLYNEUROPATHY, UNSPECIFIED LONG TERM INSULIN USE STATUS: ICD-10-CM

## 2017-12-04 DIAGNOSIS — Z23 NEED FOR PNEUMOCOCCAL VACCINE: ICD-10-CM

## 2017-12-04 PROCEDURE — 82043 UR ALBUMIN QUANTITATIVE: CPT

## 2017-12-04 PROCEDURE — 86803 HEPATITIS C AB TEST: CPT

## 2017-12-04 PROCEDURE — 82570 ASSAY OF URINE CREATININE: CPT

## 2017-12-04 PROCEDURE — 80053 COMPREHEN METABOLIC PANEL: CPT

## 2017-12-04 PROCEDURE — 83036 HEMOGLOBIN GLYCOSYLATED A1C: CPT

## 2017-12-04 PROCEDURE — 99214 OFFICE O/P EST MOD 30 MIN: CPT | Performed by: FAMILY MEDICINE

## 2017-12-04 PROCEDURE — 36415 COLL VENOUS BLD VENIPUNCTURE: CPT

## 2017-12-04 PROCEDURE — 90732 PPSV23 VACC 2 YRS+ SUBQ/IM: CPT | Performed by: FAMILY MEDICINE

## 2017-12-04 PROCEDURE — 80061 LIPID PANEL: CPT

## 2017-12-04 PROCEDURE — G0009 ADMIN PNEUMOCOCCAL VACCINE: HCPCS | Performed by: FAMILY MEDICINE

## 2017-12-04 RX ORDER — PREGABALIN 150 MG/1
150 CAPSULE ORAL 2 TIMES DAILY
Qty: 180 CAPSULE | Refills: 3 | Status: SHIPPED | OUTPATIENT
Start: 2017-12-04 | End: 2017-12-05

## 2017-12-04 NOTE — PROGRESS NOTES
HPI:    Patient ID: Dominic Triana is a 78year old female. HPI  Dominic Triana is a 78year old female.   HPI:   Here for med ck   Overall doing ok  Feels well in general  Is taking her meds      ckes feet fairly regulalry     No lesions   Had eye c • Lipid screening 4/25/2012   • Low blood potassium     potassium is going up and down    • Measles without mention of complication    • Mumps without mention of complication    • Osteoarthrosis, unspecified whether generalized or localized, unspecified meds       (E78.00) Pure hypercholesterolemia  Plan: LIPID PANEL        Ck levels     (E11.42) Type 2 diabetes mellitus with diabetic polyneuropathy, without long-term current use of insulin (HCC)  Plan: COMP METABOLIC PANEL (14), HEMOGLOBIN A1C        Ck BLUE In Vitro Strip Test sugar daily Disp: 100 each Rfl: 3   ONETOUCH DELICA LANCETS 22B Does not apply Misc USE 1 LANCET BY FINGER STICK ROUTE DAILY Disp: 100 each Rfl: 3   aspirin (ASPIR-81) 81 MG Oral Tab EC Take 1 tablet (81 mg total) by mouth daily.  D

## 2017-12-05 ENCOUNTER — TELEPHONE (OUTPATIENT)
Dept: INTERNAL MEDICINE CLINIC | Facility: CLINIC | Age: 67
End: 2017-12-05

## 2017-12-05 RX ORDER — PREGABALIN 150 MG/1
150 CAPSULE ORAL 2 TIMES DAILY
Qty: 180 CAPSULE | Refills: 1 | COMMUNITY
Start: 2017-12-05 | End: 2018-06-05

## 2017-12-05 NOTE — TELEPHONE ENCOUNTER
Called in to pharmacy x 2 ( systems were down ) to fill lyrica called in this morning #180 with 1 refill

## 2017-12-05 NOTE — TELEPHONE ENCOUNTER
Called into pharmacy Lyrica 150mg #180 with one refill controlled substances can only be refilled for 6 months at atime They never received fax yesterday

## 2017-12-08 ENCOUNTER — OFFICE VISIT (OUTPATIENT)
Dept: ENDOCRINOLOGY CLINIC | Facility: CLINIC | Age: 67
End: 2017-12-08

## 2017-12-08 VITALS
BODY MASS INDEX: 55.04 KG/M2 | RESPIRATION RATE: 18 BRPM | SYSTOLIC BLOOD PRESSURE: 128 MMHG | TEMPERATURE: 98 F | WEIGHT: 273 LBS | DIASTOLIC BLOOD PRESSURE: 78 MMHG | HEART RATE: 72 BPM | HEIGHT: 59 IN

## 2017-12-08 DIAGNOSIS — E11.42 TYPE 2 DIABETES MELLITUS WITH DIABETIC POLYNEUROPATHY, UNSPECIFIED LONG TERM INSULIN USE STATUS: ICD-10-CM

## 2017-12-08 PROCEDURE — 99213 OFFICE O/P EST LOW 20 MIN: CPT | Performed by: NURSE PRACTITIONER

## 2017-12-08 NOTE — PROGRESS NOTES
CC: Patient presents with:  Diabetes: follow up. pt forgot meter. HPI:   HPI: 78year old y/o female who presents for follow up diabetes visit. Has resumed Victoza and is back to full dose. Has gained 6 lb. Also is still testing BG daily.  No readings, l Liraglutide (VICTOZA) 18 MG/3ML Subcutaneous Solution Pen-injector, ADMINISTER 1.8 MG UNDER THE SKIN DAILY, Disp: 9 mL, Rfl: 1  •  Dapagliflozin Propanediol (FARXIGA) 5 MG Oral Tab, Take 5 mg by mouth daily. , Disp: 21 tablet, Rfl: 0  •  Dapagliflozin Propa 55.14 kg/m²   Constitutional: Oriented to person, place, and time. No distress. HEENT: Normocephalic and atraumatic. Eyes: Conjunctivae are normal.   Neck: Normal range of motion. Neck supple.    Cardiovascular: Normal rate, regular rhythm and intact d Call/fax/email glucose logs or f/u in 5 weeks     Return in about 5 weeks (around 1/12/2018) for diabetes. Pt verbalized understanding and has no further questions at this time.     Bertha ELIZABETH,LOUISE

## 2017-12-14 ENCOUNTER — OFFICE VISIT (OUTPATIENT)
Dept: INTERNAL MEDICINE CLINIC | Facility: CLINIC | Age: 67
End: 2017-12-14

## 2017-12-14 VITALS
RESPIRATION RATE: 16 BRPM | SYSTOLIC BLOOD PRESSURE: 138 MMHG | DIASTOLIC BLOOD PRESSURE: 78 MMHG | BODY MASS INDEX: 56.57 KG/M2 | HEIGHT: 58 IN | HEART RATE: 78 BPM | WEIGHT: 269.5 LBS

## 2017-12-14 DIAGNOSIS — E11.42 TYPE 2 DIABETES MELLITUS WITH DIABETIC POLYNEUROPATHY, WITHOUT LONG-TERM CURRENT USE OF INSULIN (HCC): ICD-10-CM

## 2017-12-14 DIAGNOSIS — Z51.81 THERAPEUTIC DRUG MONITORING: Primary | ICD-10-CM

## 2017-12-14 DIAGNOSIS — R94.31 ABNORMAL EKG: ICD-10-CM

## 2017-12-14 DIAGNOSIS — E66.01 MORBID OBESITY (HCC): ICD-10-CM

## 2017-12-14 DIAGNOSIS — I10 ESSENTIAL HYPERTENSION, BENIGN: ICD-10-CM

## 2017-12-14 DIAGNOSIS — E55.9 VITAMIN D DEFICIENCY: ICD-10-CM

## 2017-12-14 PROCEDURE — 99214 OFFICE O/P EST MOD 30 MIN: CPT | Performed by: INTERNAL MEDICINE

## 2017-12-14 PROCEDURE — 93000 ELECTROCARDIOGRAM COMPLETE: CPT | Performed by: INTERNAL MEDICINE

## 2017-12-14 RX ORDER — TOPIRAMATE 25 MG/1
25 TABLET ORAL 2 TIMES DAILY
Qty: 60 TABLET | Refills: 0 | Status: SHIPPED | OUTPATIENT
Start: 2017-12-14 | End: 2017-12-14

## 2017-12-14 NOTE — PROGRESS NOTES
Faustino Gandhi is a 78year old female. Chief complaint:  weight loss management and obesity related comorbidities    HPI:     Faustino Gandhi is a 78year old female new to our office today.   with PMH as listed below here for weight management   Has b times daily.  Disp: 180 capsule Rfl: 1   MetFORMIN HCl 1000 MG Oral Tab TAKE 1 TABLET(1000 MG) BY MOUTH TWICE DAILY WITH MEALS Disp: 180 tablet Rfl: 1   DICYCLOMINE HCL 10 MG Oral Cap TAKE 1 CAPSULE BY MOUTH FOUR TIMES DAILY BEFORE MEALS AND AT BEDTIME Disp complication, not stated as uncontrolled    • Unspecified essential hypertension    • Varicella without mention of complication    • Visual impairment      Past Surgical History:  No date:   No date:       Comment: x3  2012: COLONOSCOPY nourished,in no apparent distress  SKIN: no rashes,no suspicious lesions  HEENT: atraumatic, normocephalic,ears and throat are clear  NECK: supple,no adenopathy  LUNGS: clear to auscultation  CARDIO: RRR without murmur  GI: good BS's,no masses, HSM or tend exercise and behavior/stress management for success. See patient instruction below for more details.   discussed  -low carb high protein  -portion control  -Limit carbohydrates  -Eat breakfast every day   -Don't skip meals   -Read nutrition labels and keep

## 2017-12-18 RX ORDER — ACETAMINOPHEN AND CODEINE PHOSPHATE 300; 30 MG/1; MG/1
TABLET ORAL
Qty: 50 TABLET | Refills: 0 | Status: SHIPPED | OUTPATIENT
Start: 2017-12-18 | End: 2018-02-24

## 2017-12-19 RX ORDER — TOPIRAMATE 25 MG/1
TABLET ORAL
Qty: 180 TABLET | Refills: 0 | Status: SHIPPED | OUTPATIENT
Start: 2017-12-19 | End: 2018-03-22

## 2017-12-19 NOTE — TELEPHONE ENCOUNTER
Requesting Topamax pt requesting 90 day instead of 30 day  LOV: 12/14/17  RTC: 1 month  Filled: 60 with 0 refills        Future Appointments  Date Time Provider Damon Pittman   12/29/2017 11:00 AM May Devi RD [facility]   1/15/2018 10:30

## 2017-12-26 RX ORDER — ZOLPIDEM TARTRATE 5 MG/1
TABLET ORAL
Qty: 30 TABLET | Refills: 0 | Status: SHIPPED | OUTPATIENT
Start: 2017-12-26 | End: 2018-01-24

## 2017-12-29 RX ORDER — NORTRIPTYLINE HYDROCHLORIDE 25 MG/1
CAPSULE ORAL
Qty: 30 CAPSULE | Refills: 0 | Status: SHIPPED | OUTPATIENT
Start: 2017-12-29 | End: 2018-01-26

## 2018-01-10 DIAGNOSIS — E11.42 TYPE 2 DIABETES MELLITUS WITH DIABETIC POLYNEUROPATHY, UNSPECIFIED LONG TERM INSULIN USE STATUS: ICD-10-CM

## 2018-01-10 RX ORDER — DAPAGLIFLOZIN 10 MG/1
TABLET, FILM COATED ORAL
Qty: 30 TABLET | Refills: 2 | Status: SHIPPED | OUTPATIENT
Start: 2018-01-10 | End: 2018-04-10

## 2018-01-12 ENCOUNTER — LAB ENCOUNTER (OUTPATIENT)
Dept: LAB | Age: 68
End: 2018-01-12
Attending: INTERNAL MEDICINE
Payer: MEDICARE

## 2018-01-12 DIAGNOSIS — I10 ESSENTIAL HYPERTENSION, BENIGN: ICD-10-CM

## 2018-01-12 DIAGNOSIS — E66.01 MORBID OBESITY (HCC): ICD-10-CM

## 2018-01-12 DIAGNOSIS — E11.42 TYPE 2 DIABETES MELLITUS WITH DIABETIC POLYNEUROPATHY, WITHOUT LONG-TERM CURRENT USE OF INSULIN (HCC): ICD-10-CM

## 2018-01-12 DIAGNOSIS — E55.9 VITAMIN D DEFICIENCY: ICD-10-CM

## 2018-01-12 DIAGNOSIS — E53.8 VITAMIN B 12 DEFICIENCY: ICD-10-CM

## 2018-01-12 DIAGNOSIS — Z51.81 THERAPEUTIC DRUG MONITORING: ICD-10-CM

## 2018-01-12 DIAGNOSIS — D64.9 ANEMIA, UNSPECIFIED TYPE: ICD-10-CM

## 2018-01-12 PROCEDURE — 82306 VITAMIN D 25 HYDROXY: CPT

## 2018-01-12 PROCEDURE — 82397 CHEMILUMINESCENT ASSAY: CPT

## 2018-01-12 PROCEDURE — 36415 COLL VENOUS BLD VENIPUNCTURE: CPT

## 2018-01-12 PROCEDURE — 83540 ASSAY OF IRON: CPT

## 2018-01-12 PROCEDURE — 82607 VITAMIN B-12: CPT

## 2018-01-12 PROCEDURE — 85025 COMPLETE CBC W/AUTO DIFF WBC: CPT

## 2018-01-12 PROCEDURE — 84443 ASSAY THYROID STIM HORMONE: CPT

## 2018-01-12 PROCEDURE — 83550 IRON BINDING TEST: CPT

## 2018-01-13 LAB
25-HYDROXYVITAMIN D (TOTAL): 5.5 NG/ML (ref 30–100)
BASOPHILS # BLD AUTO: 0.04 X10(3) UL (ref 0–0.1)
BASOPHILS NFR BLD AUTO: 0.7 %
EOSINOPHIL # BLD AUTO: 0.18 X10(3) UL (ref 0–0.3)
EOSINOPHIL NFR BLD AUTO: 3.1 %
ERYTHROCYTE [DISTWIDTH] IN BLOOD BY AUTOMATED COUNT: 16.6 % (ref 11.5–16)
HAV AB SERPL IA-ACNC: 209 PG/ML (ref 193–986)
HCT VFR BLD AUTO: 38.7 % (ref 34–50)
HGB BLD-MCNC: 11.8 G/DL (ref 12–16)
IMMATURE GRANULOCYTE COUNT: 0.02 X10(3) UL (ref 0–1)
IMMATURE GRANULOCYTE RATIO %: 0.3 %
LYMPHOCYTES # BLD AUTO: 2.34 X10(3) UL (ref 0.9–4)
LYMPHOCYTES NFR BLD AUTO: 39.7 %
MCH RBC QN AUTO: 26.6 PG (ref 27–33.2)
MCHC RBC AUTO-ENTMCNC: 30.5 G/DL (ref 31–37)
MCV RBC AUTO: 87.4 FL (ref 81–100)
MONOCYTES # BLD AUTO: 0.38 X10(3) UL (ref 0.1–0.6)
MONOCYTES NFR BLD AUTO: 6.4 %
NEUTROPHIL ABS PRELIM: 2.94 X10 (3) UL (ref 1.3–6.7)
NEUTROPHILS # BLD AUTO: 2.94 X10(3) UL (ref 1.3–6.7)
NEUTROPHILS NFR BLD AUTO: 49.8 %
PLATELET # BLD AUTO: 287 10(3)UL (ref 150–450)
RBC # BLD AUTO: 4.43 X10(6)UL (ref 3.8–5.1)
RED CELL DISTRIBUTION WIDTH-SD: 51.9 FL (ref 35.1–46.3)
TSI SER-ACNC: 1.72 MIU/ML (ref 0.35–5.5)
WBC # BLD AUTO: 5.9 X10(3) UL (ref 4–13)

## 2018-01-15 ENCOUNTER — OFFICE VISIT (OUTPATIENT)
Dept: INTERNAL MEDICINE CLINIC | Facility: CLINIC | Age: 68
End: 2018-01-15

## 2018-01-15 ENCOUNTER — OFFICE VISIT (OUTPATIENT)
Dept: ENDOCRINOLOGY CLINIC | Facility: CLINIC | Age: 68
End: 2018-01-15

## 2018-01-15 VITALS
RESPIRATION RATE: 18 BRPM | HEART RATE: 84 BPM | WEIGHT: 263 LBS | DIASTOLIC BLOOD PRESSURE: 66 MMHG | TEMPERATURE: 98 F | SYSTOLIC BLOOD PRESSURE: 100 MMHG | HEIGHT: 59 IN | BODY MASS INDEX: 53.02 KG/M2

## 2018-01-15 VITALS
RESPIRATION RATE: 16 BRPM | WEIGHT: 261 LBS | SYSTOLIC BLOOD PRESSURE: 120 MMHG | HEART RATE: 80 BPM | HEIGHT: 59 IN | BODY MASS INDEX: 52.62 KG/M2 | DIASTOLIC BLOOD PRESSURE: 70 MMHG

## 2018-01-15 DIAGNOSIS — E78.5 HYPERLIPIDEMIA, UNSPECIFIED HYPERLIPIDEMIA TYPE: ICD-10-CM

## 2018-01-15 DIAGNOSIS — D64.9 ANEMIA, UNSPECIFIED TYPE: ICD-10-CM

## 2018-01-15 DIAGNOSIS — E11.42 DIABETIC POLYNEUROPATHY ASSOCIATED WITH TYPE 2 DIABETES MELLITUS (HCC): ICD-10-CM

## 2018-01-15 DIAGNOSIS — E11.42 TYPE 2 DIABETES MELLITUS WITH DIABETIC POLYNEUROPATHY, WITHOUT LONG-TERM CURRENT USE OF INSULIN (HCC): ICD-10-CM

## 2018-01-15 DIAGNOSIS — Z51.81 THERAPEUTIC DRUG MONITORING: Primary | ICD-10-CM

## 2018-01-15 DIAGNOSIS — E66.01 MORBID OBESITY (HCC): ICD-10-CM

## 2018-01-15 DIAGNOSIS — E53.8 LOW VITAMIN B12 LEVEL: ICD-10-CM

## 2018-01-15 LAB
CARTRIDGE LOT#: ABNORMAL NUMERIC
HEMOGLOBIN A1C: 7.3 % (ref 4.3–5.6)
IRON SATURATION: 11 % (ref 13–45)
IRON: 47 UG/DL (ref 28–170)
TOTAL IRON BINDING CAPACITY: 416 UG/DL (ref 298–536)
TRANSFERRIN: 279 MG/DL (ref 200–360)

## 2018-01-15 PROCEDURE — 99214 OFFICE O/P EST MOD 30 MIN: CPT | Performed by: INTERNAL MEDICINE

## 2018-01-15 PROCEDURE — 96372 THER/PROPH/DIAG INJ SC/IM: CPT | Performed by: INTERNAL MEDICINE

## 2018-01-15 PROCEDURE — 99213 OFFICE O/P EST LOW 20 MIN: CPT | Performed by: NURSE PRACTITIONER

## 2018-01-15 PROCEDURE — 83036 HEMOGLOBIN GLYCOSYLATED A1C: CPT | Performed by: NURSE PRACTITIONER

## 2018-01-15 RX ORDER — ERGOCALCIFEROL 1.25 MG/1
50000 CAPSULE ORAL WEEKLY
Qty: 12 CAPSULE | Refills: 1 | Status: SHIPPED | OUTPATIENT
Start: 2018-01-15 | End: 2018-04-03

## 2018-01-15 RX ORDER — CYANOCOBALAMIN 1000 UG/ML
1000 INJECTION INTRAMUSCULAR; SUBCUTANEOUS ONCE
Status: COMPLETED | OUTPATIENT
Start: 2018-01-15 | End: 2018-01-15

## 2018-01-15 RX ADMIN — CYANOCOBALAMIN 1000 MCG: 1000 INJECTION INTRAMUSCULAR; SUBCUTANEOUS at 12:08:00

## 2018-01-15 NOTE — PROGRESS NOTES
CC: Patient presents with:  Diabetes: follow up. pt did bring meter. HPI:   HPI: 78year old y/o female who presents for follow up diabetes visit. Saw weight loss center, started farxiga at [facility] here and topiramate there, has lost 10 lb.  Is checking BG a 0  •  Zolpidem Tartrate 5 MG Oral Tab, TAKE 1 TABLET BY MOUTH EVERY NIGHT AT BEDTIME, Disp: 30 tablet, Rfl: 0  •  TOPIRAMATE 25 MG Oral Tab, TAKE 1 TABLET BY MOUTH ONCE DAILY FOR 1 WEEK, THEN 1 TABLET TWICE DAILY UNTIL NEXT VISIT, Disp: 180 tablet, Rfl: 0 motion. Neck supple. Cardiovascular: Normal rate, regular rhythm and intact distal pulses. No murmur, rubs or gallops. No edema noted   Pulmonary/Chest: Effort and breath sounds normal. No respiratory distress.  No wheezes,  rhonchi or rales   Abdominal: understanding and has no further questions at this time.     Bertha ELIZABETH,CDE

## 2018-01-15 NOTE — PATIENT INSTRUCTIONS
Congratulations on your 12 LB # weight loss! Continue making lifestyle changes that focus on good nutrition, regular exercise and stress management.     Today we reviewed your labs with findings of:  Anemia, vitamin D deficiency, improving Hab1c     Medicat

## 2018-01-15 NOTE — PROGRESS NOTES
Andrés Pichardo is a 78year old female.     Chief complaint:weight loss follow up     HPI:   Daryle Pons here for follow up on weight loss   Wt Readings from Last 6 Encounters:  01/15/18 : 261 lb  01/15/18 : 263 lb  12/14/17 : 269 lb 8 oz  12/08/17 : 273 lb by mouth 2 (two) times daily.  Disp: 180 capsule Rfl: 1   DICYCLOMINE HCL 10 MG Oral Cap TAKE 1 CAPSULE BY MOUTH FOUR TIMES DAILY BEFORE MEALS AND AT BEDTIME Disp: 120 capsule Rfl: 0   SPIRONOLACTONE 25 MG Oral Tab TAKE 1 TABLET BY MOUTH DAILY Disp: 90 tabl SURGERY      Comment: lt in 6/2010, rt in 2/2010  No date: LAMINECTOMY,LUMBAR      Comment: L4 L5  No date: OTHER SURGICAL HISTORY      Comment: anal fistulectomy     Social History:  Smoking status: Former Smoker anemia work up   - ergocalciferol 31718 units Oral Cap; Take 1 capsule (50,000 Units total) by mouth once a week. Dispense: 12 capsule; Refill: 1  - cyanocobalamin (VITAMIN B12) 1000 MCG/ML injection 1,000 mcg;  Inject 1 mL (1,000 mcg total) into the muscl

## 2018-01-16 LAB — LEPTIN: 50.8 NG/ML

## 2018-01-19 RX ORDER — DICYCLOMINE HYDROCHLORIDE 10 MG/1
CAPSULE ORAL
Qty: 120 CAPSULE | Refills: 0 | Status: SHIPPED | OUTPATIENT
Start: 2018-01-19 | End: 2018-02-15

## 2018-01-23 ENCOUNTER — TELEPHONE (OUTPATIENT)
Dept: INTERNAL MEDICINE CLINIC | Facility: CLINIC | Age: 68
End: 2018-01-23

## 2018-01-24 RX ORDER — ZOLPIDEM TARTRATE 5 MG/1
TABLET ORAL
Qty: 30 TABLET | Refills: 0 | Status: SHIPPED | OUTPATIENT
Start: 2018-01-24 | End: 2018-02-24

## 2018-01-29 ENCOUNTER — TELEPHONE (OUTPATIENT)
Dept: INTERNAL MEDICINE CLINIC | Facility: CLINIC | Age: 68
End: 2018-01-29

## 2018-01-29 RX ORDER — NORTRIPTYLINE HYDROCHLORIDE 25 MG/1
CAPSULE ORAL
Qty: 30 CAPSULE | Refills: 3 | Status: SHIPPED | OUTPATIENT
Start: 2018-01-29 | End: 2018-05-23

## 2018-01-29 RX ORDER — OMEPRAZOLE 20 MG/1
CAPSULE, DELAYED RELEASE ORAL
Qty: 90 CAPSULE | Refills: 1 | Status: SHIPPED | OUTPATIENT
Start: 2018-01-29 | End: 2018-07-19

## 2018-01-29 NOTE — TELEPHONE ENCOUNTER
Patient needs Omepradole 20mg capsules, pharmacy states that it was denied through [facility] but she would like a nurse to call her.  Patient uses [facility] in [address]

## 2018-02-13 RX ORDER — SPIRONOLACTONE 25 MG/1
TABLET ORAL
Qty: 90 TABLET | Refills: 0 | Status: SHIPPED | OUTPATIENT
Start: 2018-02-13 | End: 2018-05-17

## 2018-02-15 ENCOUNTER — OFFICE VISIT (OUTPATIENT)
Dept: INTERNAL MEDICINE CLINIC | Facility: CLINIC | Age: 68
End: 2018-02-15

## 2018-02-15 VITALS
DIASTOLIC BLOOD PRESSURE: 78 MMHG | HEART RATE: 84 BPM | WEIGHT: 259 LBS | RESPIRATION RATE: 16 BRPM | SYSTOLIC BLOOD PRESSURE: 120 MMHG | HEIGHT: 58 IN | BODY MASS INDEX: 54.37 KG/M2

## 2018-02-15 DIAGNOSIS — E53.8 LOW VITAMIN B12 LEVEL: ICD-10-CM

## 2018-02-15 DIAGNOSIS — E66.01 MORBID OBESITY (HCC): ICD-10-CM

## 2018-02-15 DIAGNOSIS — Z51.81 THERAPEUTIC DRUG MONITORING: Primary | ICD-10-CM

## 2018-02-15 PROCEDURE — 96372 THER/PROPH/DIAG INJ SC/IM: CPT | Performed by: INTERNAL MEDICINE

## 2018-02-15 PROCEDURE — 99213 OFFICE O/P EST LOW 20 MIN: CPT | Performed by: INTERNAL MEDICINE

## 2018-02-15 RX ORDER — CYANOCOBALAMIN 1000 UG/ML
1000 INJECTION INTRAMUSCULAR; SUBCUTANEOUS ONCE
Status: COMPLETED | OUTPATIENT
Start: 2018-02-15 | End: 2018-02-15

## 2018-02-15 RX ORDER — TOPIRAMATE 25 MG/1
TABLET ORAL
Qty: 180 TABLET | Refills: 0 | Status: CANCELLED | OUTPATIENT
Start: 2018-02-15

## 2018-02-15 RX ORDER — SPIRONOLACTONE 25 MG/1
25 TABLET ORAL
Qty: 90 TABLET | Refills: 0 | Status: CANCELLED | OUTPATIENT
Start: 2018-02-15

## 2018-02-15 RX ORDER — TOPIRAMATE 50 MG/1
50 TABLET, FILM COATED ORAL 2 TIMES DAILY
Qty: 60 TABLET | Refills: 2 | Status: SHIPPED | OUTPATIENT
Start: 2018-02-15 | End: 2018-04-23

## 2018-02-15 RX ADMIN — CYANOCOBALAMIN 1000 MCG: 1000 INJECTION INTRAMUSCULAR; SUBCUTANEOUS at 11:05:00

## 2018-02-15 NOTE — PROGRESS NOTES
Raul Uribe is a 78year old female.     Chief complaint:weight loss follow up , low vitamin b12 , medication refill       HPI:   NAZARIO here for follow up on weight loss   Wt Readings from Last 6 Encounters:  02/15/18 : 259 lb  01/15/18 : 261 lb  01/1 tablet Rfl: 0   Liraglutide (VICTOZA) 18 MG/3ML Subcutaneous Solution Pen-injector ADMINISTER 1.8 MG UNDER THE SKIN DAILY Disp: 9 mL Rfl: 1   pregabalin (LYRICA) 150 MG Oral Cap Take 1 capsule (150 mg total) by mouth 2 (two) times daily.  Disp: 180 capsule REPLACEMENT SURGERY      Comment: lt in 6/2010, rt in 2/2010  No date: LAMINECTOMY,LUMBAR      Comment: L4 L5  No date: OTHER SURGICAL HISTORY      Comment: anal fistulectomy     Social History:  Smoking status: Former Smoker Patient has lost a total weight loss of 10 lb# since consult 1 month ago.     ·  increase topamax to 50 mg BID   · Add exercise 40 minutes 3 times per week   · Increase water to more than 64 oz per day   · Add probiotics ( VSL or batista colon health)  · I

## 2018-02-15 NOTE — PATIENT INSTRUCTIONS
Congratulations on your 10 LB # weight loss! Continue making lifestyle changes that focus on good nutrition, regular exercise and stress management.         Medication Plan:  ·  increase topamax to 50 mg BID   · Add exercise 40 minutes 3 times per week   ·

## 2018-02-20 RX ORDER — PRAVASTATIN SODIUM 20 MG
TABLET ORAL
Qty: 90 TABLET | Refills: 1 | Status: SHIPPED | OUTPATIENT
Start: 2018-02-20 | End: 2018-08-13

## 2018-02-24 ENCOUNTER — TELEPHONE (OUTPATIENT)
Dept: INTERNAL MEDICINE CLINIC | Facility: CLINIC | Age: 68
End: 2018-02-24

## 2018-02-26 RX ORDER — ZOLPIDEM TARTRATE 5 MG/1
TABLET ORAL
Qty: 30 TABLET | Refills: 0 | Status: SHIPPED | OUTPATIENT
Start: 2018-02-26 | End: 2018-04-02

## 2018-02-26 RX ORDER — ACETAMINOPHEN AND CODEINE PHOSPHATE 300; 30 MG/1; MG/1
TABLET ORAL
Qty: 50 TABLET | Refills: 0 | Status: SHIPPED | OUTPATIENT
Start: 2018-02-26 | End: 2018-04-02

## 2018-02-28 ENCOUNTER — OFFICE VISIT (OUTPATIENT)
Dept: INTERNAL MEDICINE CLINIC | Facility: CLINIC | Age: 68
End: 2018-02-28

## 2018-02-28 DIAGNOSIS — E66.01 OBESITY, MORBID, BMI 50 OR HIGHER (HCC): ICD-10-CM

## 2018-02-28 DIAGNOSIS — Z79.4 TYPE 2 DIABETES MELLITUS WITH DIABETIC NEUROPATHY, WITH LONG-TERM CURRENT USE OF INSULIN (HCC): ICD-10-CM

## 2018-02-28 DIAGNOSIS — E11.40 TYPE 2 DIABETES MELLITUS WITH DIABETIC NEUROPATHY, WITH LONG-TERM CURRENT USE OF INSULIN (HCC): ICD-10-CM

## 2018-02-28 PROCEDURE — G0447 BEHAVIOR COUNSEL OBESITY 15M: HCPCS | Performed by: DIETITIAN, REGISTERED

## 2018-03-01 NOTE — TELEPHONE ENCOUNTER
called pharmacy they deny receiving fax verbal given on Tylenol #3 and zopidiem 5 mg that was authorized on 02/26/18

## 2018-03-01 NOTE — TELEPHONE ENCOUNTER
Patient called and said [facility] Benavide[facility] never received this two refills; please resend asap per patient

## 2018-03-02 NOTE — PROGRESS NOTES
INITIAL OUTPATIENT NUTRITION CONSULTATION    Nutrition Assessment    Medical Diagnosis: Obesity    Physical Findings: fatigue    Client Age and Gender: 78year old female    Marital Status and Occupation: .   Living with daughter and grandchildre ONCE DAILY FOR 1 WEEK, THEN 1 TABLET TWICE DAILY UNTIL NEXT VISIT Disp: 180 tablet Rfl: 0   Liraglutide (VICTOZA) 18 MG/3ML Subcutaneous Solution Pen-injector ADMINISTER 1.8 MG UNDER THE SKIN DAILY Disp: 9 mL Rfl: 1   pregabalin (LYRICA) 150 MG Oral Cap Ta ----------  HDL CHOLESTEROL   Date Value Ref Range Status   09/14/2011 84 > OR = 46 mg/dL Final   ----------  HDL CHOL   Date Value Ref Range Status   02/11/2014 105 mg/dL Final   Comment:   <26    mg/dL  Highest CHD risk  25-35  mg/dL  High CHD risk  35 Comprehensive nutrition education and evaluation provided for weight loss. Pt has moderate quality diet. Positive habits include makes most meals from scratch. Rarely goes out for fast food. Eats a variety of healthy foods.   Love fish, chicken and vege

## 2018-03-21 RX ORDER — DICYCLOMINE HYDROCHLORIDE 10 MG/1
CAPSULE ORAL
Qty: 120 CAPSULE | Refills: 3 | Status: SHIPPED | OUTPATIENT
Start: 2018-03-21 | End: 2018-04-16

## 2018-03-22 ENCOUNTER — OFFICE VISIT (OUTPATIENT)
Dept: INTERNAL MEDICINE CLINIC | Facility: CLINIC | Age: 68
End: 2018-03-22

## 2018-03-22 VITALS
HEART RATE: 84 BPM | WEIGHT: 255 LBS | BODY MASS INDEX: 51.41 KG/M2 | SYSTOLIC BLOOD PRESSURE: 120 MMHG | DIASTOLIC BLOOD PRESSURE: 78 MMHG | HEIGHT: 59 IN | RESPIRATION RATE: 16 BRPM

## 2018-03-22 DIAGNOSIS — E66.01 OBESITY, MORBID, BMI 50 OR HIGHER (HCC): ICD-10-CM

## 2018-03-22 DIAGNOSIS — Z51.81 THERAPEUTIC DRUG MONITORING: Primary | ICD-10-CM

## 2018-03-22 PROCEDURE — 96372 THER/PROPH/DIAG INJ SC/IM: CPT | Performed by: INTERNAL MEDICINE

## 2018-03-22 PROCEDURE — 99213 OFFICE O/P EST LOW 20 MIN: CPT | Performed by: INTERNAL MEDICINE

## 2018-03-22 RX ORDER — TOPIRAMATE 25 MG/1
TABLET ORAL
Qty: 180 TABLET | Refills: 0 | Status: SHIPPED | OUTPATIENT
Start: 2018-03-22 | End: 2018-04-25

## 2018-03-22 RX ORDER — CYANOCOBALAMIN 1000 UG/ML
1000 INJECTION INTRAMUSCULAR; SUBCUTANEOUS ONCE
Status: COMPLETED | OUTPATIENT
Start: 2018-03-22 | End: 2018-03-22

## 2018-03-22 RX ADMIN — CYANOCOBALAMIN 1000 MCG: 1000 INJECTION INTRAMUSCULAR; SUBCUTANEOUS at 12:15:00

## 2018-03-22 NOTE — PROGRESS NOTES
CC: Patient presents with:  Weight Check: down 4 lb, Dr NELSON transfer        HPI:     Here for follow up   Lost 4 lbs   Having trouble getting her family on board for healthy eating  Not able to do much exercise  Sugars have been running well   Wishes she sugar daily Disp: 100 each Rfl: 3   ONETOUCH DELICA LANCETS 51C Does not apply Misc USE 1 LANCET BY FINGER STICK ROUTE DAILY Disp: 100 each Rfl: 3   aspirin (ASPIR-81) 81 MG Oral Tab EC Take 1 tablet (81 mg total) by mouth daily.  Disp: 1 tablet Rfl: 0 HSM  EXTREMITIES: no cyanosis, no clubbing, no edema    No orders of the defined types were placed in this encounter.        Signed Prescriptions Disp Refills    topiramate 25 MG Oral Tab 180 tablet 0      Sig: TAKE 1 TABLET BY MOUTH ONCE DAILY FOR 1 WEEK,

## 2018-04-03 NOTE — TELEPHONE ENCOUNTER
Refill requested: Tylenol #3 + Zolpidem     Failed protocol -       Last refill: 2/26/18 Zolpidem #30 NR + 2/26/18 Tylenol #3 / #50 NR    Relevant Labs: NA  Last OV / RTC advised: 12/4/17   Appt Scheduled: NO  Your appointments     Date & Time Appointment

## 2018-04-04 RX ORDER — ACETAMINOPHEN AND CODEINE PHOSPHATE 300; 30 MG/1; MG/1
TABLET ORAL
Qty: 50 TABLET | Refills: 0 | Status: SHIPPED | OUTPATIENT
Start: 2018-04-04 | End: 2018-05-21

## 2018-04-04 RX ORDER — ZOLPIDEM TARTRATE 5 MG/1
TABLET ORAL
Qty: 30 TABLET | Refills: 0 | Status: SHIPPED | OUTPATIENT
Start: 2018-04-04 | End: 2018-05-21

## 2018-04-06 NOTE — TELEPHONE ENCOUNTER
Please resend both of these refills to [facility]; they are telling patient today they did not receive either of them; please send today ppr

## 2018-04-10 DIAGNOSIS — E11.42 TYPE 2 DIABETES MELLITUS WITH DIABETIC POLYNEUROPATHY (HCC): ICD-10-CM

## 2018-04-10 RX ORDER — ZOLPIDEM TARTRATE 5 MG/1
TABLET ORAL
Qty: 30 TABLET | Refills: 0 | OUTPATIENT
Start: 2018-04-10

## 2018-04-10 RX ORDER — DAPAGLIFLOZIN 10 MG/1
TABLET, FILM COATED ORAL
Qty: 30 TABLET | Refills: 2 | Status: SHIPPED | OUTPATIENT
Start: 2018-04-10 | End: 2018-07-10

## 2018-04-10 RX ORDER — ACETAMINOPHEN AND CODEINE PHOSPHATE 300; 30 MG/1; MG/1
TABLET ORAL
Qty: 50 TABLET | Refills: 0 | OUTPATIENT
Start: 2018-04-10

## 2018-04-16 ENCOUNTER — LAB ENCOUNTER (OUTPATIENT)
Dept: LAB | Age: 68
End: 2018-04-16
Attending: NURSE PRACTITIONER
Payer: MEDICARE

## 2018-04-16 ENCOUNTER — TELEPHONE (OUTPATIENT)
Dept: INTERNAL MEDICINE CLINIC | Facility: CLINIC | Age: 68
End: 2018-04-16

## 2018-04-16 ENCOUNTER — OFFICE VISIT (OUTPATIENT)
Dept: ENDOCRINOLOGY CLINIC | Facility: CLINIC | Age: 68
End: 2018-04-16

## 2018-04-16 VITALS
RESPIRATION RATE: 18 BRPM | SYSTOLIC BLOOD PRESSURE: 100 MMHG | TEMPERATURE: 98 F | DIASTOLIC BLOOD PRESSURE: 68 MMHG | WEIGHT: 255 LBS | HEIGHT: 59 IN | HEART RATE: 72 BPM | BODY MASS INDEX: 51.41 KG/M2

## 2018-04-16 DIAGNOSIS — D64.9 ANEMIA, UNSPECIFIED TYPE: ICD-10-CM

## 2018-04-16 DIAGNOSIS — E78.00 PURE HYPERCHOLESTEROLEMIA: ICD-10-CM

## 2018-04-16 DIAGNOSIS — E11.42 TYPE 2 DIABETES MELLITUS WITH DIABETIC POLYNEUROPATHY, WITHOUT LONG-TERM CURRENT USE OF INSULIN (HCC): Primary | ICD-10-CM

## 2018-04-16 DIAGNOSIS — E11.42 TYPE 2 DIABETES MELLITUS WITH DIABETIC POLYNEUROPATHY, WITHOUT LONG-TERM CURRENT USE OF INSULIN (HCC): ICD-10-CM

## 2018-04-16 PROCEDURE — 82570 ASSAY OF URINE CREATININE: CPT

## 2018-04-16 PROCEDURE — 80053 COMPREHEN METABOLIC PANEL: CPT

## 2018-04-16 PROCEDURE — 83036 HEMOGLOBIN GLYCOSYLATED A1C: CPT

## 2018-04-16 PROCEDURE — 82728 ASSAY OF FERRITIN: CPT

## 2018-04-16 PROCEDURE — 82043 UR ALBUMIN QUANTITATIVE: CPT

## 2018-04-16 PROCEDURE — 80061 LIPID PANEL: CPT

## 2018-04-16 PROCEDURE — 99213 OFFICE O/P EST LOW 20 MIN: CPT | Performed by: NURSE PRACTITIONER

## 2018-04-16 RX ORDER — LANCETS 33 GAUGE
EACH MISCELLANEOUS
Qty: 100 EACH | Refills: 3 | Status: SHIPPED | OUTPATIENT
Start: 2018-04-16 | End: 2019-08-23

## 2018-04-16 NOTE — PROGRESS NOTES
CC: Patient presents with: Follow - Up: diabetes    HPI:   HPI: 78year old y/o female who presents for follow up diabetes visit. BG remain at goal, has lost a total of 18 lb since 12/2017. Is tolerating meds without difficulty.  8year old grandson who l SKIN DAILY, Disp: 27 mL, Rfl: 0  •  ONETOUCH ULTRA BLUE In Vitro Strip, Test sugar daily, Disp: 100 strip, Rfl: 3  •  ONETOUCH DELICA LANCETS 47Z Does not apply Misc, USE 1 LANCET BY FINGER STICK ROUTE DAILY, Disp: 100 each, Rfl: 3  •  Insulin Pen Needle ( distress. HEENT: Normocephalic and atraumatic. Eyes: Conjunctivae are normal.   Neck: Normal range of motion. Neck supple. Cardiovascular: Normal rate, regular rhythm and intact distal pulses. No murmur, rubs or gallops.  No edema noted   Pulmonary/C at goal cont meds   Lipids: at goal cont statin recheck today   Tobacco: n/a   Pneumonia vaccine: up to date   Depression screen: up to date     Check glucoses 1 times daily - fasting, premeals and/or bedtime.  Vary times   Call/fax/email glucose logs or f/

## 2018-04-17 RX ORDER — TOPIRAMATE 25 MG/1
TABLET ORAL
Qty: 180 TABLET | Refills: 0 | OUTPATIENT
Start: 2018-04-17

## 2018-04-18 NOTE — TELEPHONE ENCOUNTER
Script was sent into pharmacy for a 90 day supply on 3/22/18. Patient should have enough until June 2018.

## 2018-04-25 RX ORDER — TOPIRAMATE 50 MG/1
TABLET, FILM COATED ORAL
Qty: 60 TABLET | Refills: 0 | Status: SHIPPED | OUTPATIENT
Start: 2018-04-25 | End: 2018-06-05

## 2018-05-16 ENCOUNTER — OFFICE VISIT (OUTPATIENT)
Dept: INTERNAL MEDICINE CLINIC | Facility: CLINIC | Age: 68
End: 2018-05-16

## 2018-05-16 ENCOUNTER — TELEPHONE (OUTPATIENT)
Dept: INTERNAL MEDICINE CLINIC | Facility: CLINIC | Age: 68
End: 2018-05-16

## 2018-05-16 VITALS
DIASTOLIC BLOOD PRESSURE: 76 MMHG | BODY MASS INDEX: 51.41 KG/M2 | HEART RATE: 78 BPM | RESPIRATION RATE: 16 BRPM | WEIGHT: 255 LBS | SYSTOLIC BLOOD PRESSURE: 122 MMHG | HEIGHT: 59 IN

## 2018-05-16 DIAGNOSIS — E66.01 MORBID OBESITY DUE TO EXCESS CALORIES (HCC): ICD-10-CM

## 2018-05-16 DIAGNOSIS — E53.8 VITAMIN B 12 DEFICIENCY: ICD-10-CM

## 2018-05-16 DIAGNOSIS — R94.31 ABNORMAL EKG: ICD-10-CM

## 2018-05-16 DIAGNOSIS — E11.42 TYPE 2 DIABETES MELLITUS WITH DIABETIC POLYNEUROPATHY, WITHOUT LONG-TERM CURRENT USE OF INSULIN (HCC): ICD-10-CM

## 2018-05-16 DIAGNOSIS — Z51.81 THERAPEUTIC DRUG MONITORING: Primary | ICD-10-CM

## 2018-05-16 PROCEDURE — 96372 THER/PROPH/DIAG INJ SC/IM: CPT | Performed by: INTERNAL MEDICINE

## 2018-05-16 PROCEDURE — 99214 OFFICE O/P EST MOD 30 MIN: CPT | Performed by: INTERNAL MEDICINE

## 2018-05-16 RX ORDER — CYANOCOBALAMIN 1000 UG/ML
1000 INJECTION INTRAMUSCULAR; SUBCUTANEOUS ONCE
Status: COMPLETED | OUTPATIENT
Start: 2018-05-16 | End: 2018-05-16

## 2018-05-16 RX ADMIN — CYANOCOBALAMIN 1000 MCG: 1000 INJECTION INTRAMUSCULAR; SUBCUTANEOUS at 14:04:00

## 2018-05-16 NOTE — PROGRESS NOTES
CC: Patient presents with:  Weight Check: same       HPI:     Here for follow up   Weight is the same.    Denies any issues with medication   Unsure if it helps with appetite   Never did her stress test  Reviewed a1c data, tolerating victoza well mg total) by mouth daily.  Disp: 1 tablet Rfl: 0      Past Medical History:   Diagnosis Date   • Anxiety    • Colitis    • Depression    • DIABETES    • Esophageal reflux    • Essential hypertension, benign 4/12/2013   • Lipid screening 4/25/2012   • Low bl STRESS(CPT=93350/03249 [facility] [address])     ASSESSMENT:   Therapeutic drug monitoring  (primary encounter diagnosis)  Type 2 diabetes mellitus with diabetic polyneuropathy, without long-term current use of insulin (HCC)  Morbid obesity due to excess calories (HC

## 2018-05-17 RX ORDER — ACETAMINOPHEN AND CODEINE PHOSPHATE 300; 30 MG/1; MG/1
TABLET ORAL
Qty: 50 TABLET | Refills: 0 | OUTPATIENT
Start: 2018-05-17

## 2018-05-17 RX ORDER — ZOLPIDEM TARTRATE 5 MG/1
TABLET ORAL
Qty: 30 TABLET | Refills: 0 | OUTPATIENT
Start: 2018-05-17

## 2018-05-17 NOTE — TELEPHONE ENCOUNTER
Acetaminophen-Codeine #3 filled on 4/4/18 #50    Zolpidem Tartrate last filled 4/4/18 #30    LOV 12/4/17

## 2018-05-18 RX ORDER — SPIRONOLACTONE 25 MG/1
TABLET ORAL
Qty: 90 TABLET | Refills: 0 | Status: SHIPPED | OUTPATIENT
Start: 2018-05-18 | End: 2018-08-13

## 2018-05-21 ENCOUNTER — TELEPHONE (OUTPATIENT)
Dept: INTERNAL MEDICINE CLINIC | Facility: CLINIC | Age: 68
End: 2018-05-21

## 2018-05-21 RX ORDER — ACETAMINOPHEN AND CODEINE PHOSPHATE 300; 30 MG/1; MG/1
1 TABLET ORAL EVERY 4 HOURS PRN
Qty: 50 TABLET | Refills: 0 | Status: SHIPPED | OUTPATIENT
Start: 2018-05-21 | End: 2018-07-10

## 2018-05-21 RX ORDER — ZOLPIDEM TARTRATE 5 MG/1
TABLET ORAL
Qty: 30 TABLET | Refills: 0 | Status: SHIPPED | OUTPATIENT
Start: 2018-05-21 | End: 2018-07-10

## 2018-05-21 NOTE — TELEPHONE ENCOUNTER
Patient calling to speak with nurse regarding handicap placard form. Patient is following up on form dropped off 05/11/18.  Patient would like a call back

## 2018-05-21 NOTE — TELEPHONE ENCOUNTER
Patient would like refill of Tylenol #3 and Zolpidem. Rx are pending.  Seth Loyola would like to  on Tuesday 5/21/18

## 2018-05-21 NOTE — TELEPHONE ENCOUNTER
ZOLPIDEM TARTRATE 5 MG Oral Tab   Acetaminophen Codeine #3    Please send to [facility] ASAP patient stated that pharmacy needs a \"new\" RX sent for these to be refilled.   Please advise and contact patient she doesn't understand the delay

## 2018-05-23 RX ORDER — NORTRIPTYLINE HYDROCHLORIDE 25 MG/1
CAPSULE ORAL
Qty: 30 CAPSULE | Refills: 0 | Status: SHIPPED | OUTPATIENT
Start: 2018-05-23 | End: 2018-05-23

## 2018-05-25 RX ORDER — NORTRIPTYLINE HYDROCHLORIDE 25 MG/1
CAPSULE ORAL
Qty: 90 CAPSULE | Refills: 0 | Status: SHIPPED | OUTPATIENT
Start: 2018-05-25 | End: 2018-10-03

## 2018-06-06 RX ORDER — TOPIRAMATE 50 MG/1
TABLET, FILM COATED ORAL
Qty: 60 TABLET | Refills: 0 | Status: SHIPPED | OUTPATIENT
Start: 2018-06-06 | End: 2018-06-12

## 2018-06-06 NOTE — TELEPHONE ENCOUNTER
Requesting:    Pending Prescriptions Disp Refills    TOPIRAMATE 50 MG Oral Tab [Pharmacy Med Name: TOPIRAMATE 50MG TABLETS] 60 tablet 0     Sig: TAKE 1 TABLET BY MOUTH TWICE DAILY       LOV: 5/16/18  RTC: 4 weeks  Filled: 4/25/18 with 0 refills    Future A

## 2018-06-12 ENCOUNTER — OFFICE VISIT (OUTPATIENT)
Dept: INTERNAL MEDICINE CLINIC | Facility: CLINIC | Age: 68
End: 2018-06-12

## 2018-06-12 VITALS
WEIGHT: 250 LBS | SYSTOLIC BLOOD PRESSURE: 126 MMHG | HEART RATE: 86 BPM | BODY MASS INDEX: 50.4 KG/M2 | RESPIRATION RATE: 16 BRPM | HEIGHT: 59 IN | DIASTOLIC BLOOD PRESSURE: 70 MMHG

## 2018-06-12 DIAGNOSIS — Z51.81 THERAPEUTIC DRUG MONITORING: Primary | ICD-10-CM

## 2018-06-12 DIAGNOSIS — E66.01 MORBID OBESITY DUE TO EXCESS CALORIES (HCC): ICD-10-CM

## 2018-06-12 DIAGNOSIS — E11.42 TYPE 2 DIABETES MELLITUS WITH DIABETIC POLYNEUROPATHY, WITHOUT LONG-TERM CURRENT USE OF INSULIN (HCC): ICD-10-CM

## 2018-06-12 PROCEDURE — 96372 THER/PROPH/DIAG INJ SC/IM: CPT | Performed by: INTERNAL MEDICINE

## 2018-06-12 PROCEDURE — 99214 OFFICE O/P EST MOD 30 MIN: CPT | Performed by: INTERNAL MEDICINE

## 2018-06-12 RX ORDER — TOPIRAMATE 50 MG/1
50 TABLET, FILM COATED ORAL 2 TIMES DAILY
Qty: 180 TABLET | Refills: 0 | Status: SHIPPED | OUTPATIENT
Start: 2018-06-12 | End: 2018-11-01

## 2018-06-12 RX ORDER — CYANOCOBALAMIN 1000 UG/ML
1000 INJECTION INTRAMUSCULAR; SUBCUTANEOUS ONCE
Status: COMPLETED | OUTPATIENT
Start: 2018-06-12 | End: 2018-06-12

## 2018-06-12 RX ADMIN — CYANOCOBALAMIN 1000 MCG: 1000 INJECTION INTRAMUSCULAR; SUBCUTANEOUS at 11:28:00

## 2018-06-12 NOTE — PROGRESS NOTES
CC: Patient presents with:  Weight Check: Down 5lbs       HPI:     Here for follow up   Lost 5 lbs this month  Grandson had a stroke and had a lot more stress. Is ready to make changes for her entire family to start eating more healthy and balanced. Cap TAKE 1 CAPSULE BY MOUTH FOUR TIMES DAILY BEFORE MEALS AND AT BEDTIME Disp: 120 capsule Rfl: 0   aspirin (ASPIR-81) 81 MG Oral Tab EC Take 1 tablet (81 mg total) by mouth daily.  Disp: 1 tablet Rfl: 0      Past Medical History:   Diagnosis Date   • Anxie orders of the defined types were placed in this encounter. Signed Prescriptions Disp Refills    topiramate 50 MG Oral Tab 180 tablet 0      Sig: Take 1 tablet (50 mg total) by mouth 2 (two) times daily.       Liraglutide (VICTOZA) 18 MG/3ML [employer]

## 2018-06-23 RX ORDER — NORTRIPTYLINE HYDROCHLORIDE 25 MG/1
CAPSULE ORAL
Qty: 30 CAPSULE | Refills: 0 | OUTPATIENT
Start: 2018-06-23

## 2018-07-01 DIAGNOSIS — D64.9 ANEMIA, UNSPECIFIED TYPE: ICD-10-CM

## 2018-07-02 RX ORDER — ERGOCALCIFEROL 1.25 MG/1
CAPSULE ORAL
Qty: 12 CAPSULE | Refills: 0 | OUTPATIENT
Start: 2018-07-02

## 2018-07-02 NOTE — TELEPHONE ENCOUNTER
Requesting vitamin d  LOV: 6/12/18  RTC: 4 weeks  Filled: 1/15/18 #12 with 1 refills    Future Appointments  Date Time Provider Damon Pittman   7/10/2018 11:40 AM MD MAX Khan EMG [facility] [address]       Denied.  Finished with this dose

## 2018-07-10 ENCOUNTER — OFFICE VISIT (OUTPATIENT)
Dept: INTERNAL MEDICINE CLINIC | Facility: CLINIC | Age: 68
End: 2018-07-10

## 2018-07-10 ENCOUNTER — TELEPHONE (OUTPATIENT)
Dept: INTERNAL MEDICINE CLINIC | Facility: CLINIC | Age: 68
End: 2018-07-10

## 2018-07-10 VITALS
HEART RATE: 84 BPM | DIASTOLIC BLOOD PRESSURE: 80 MMHG | BODY MASS INDEX: 50.4 KG/M2 | SYSTOLIC BLOOD PRESSURE: 122 MMHG | HEIGHT: 59 IN | WEIGHT: 250 LBS | RESPIRATION RATE: 16 BRPM

## 2018-07-10 DIAGNOSIS — E53.8 VITAMIN B 12 DEFICIENCY: ICD-10-CM

## 2018-07-10 DIAGNOSIS — Z51.81 THERAPEUTIC DRUG MONITORING: Primary | ICD-10-CM

## 2018-07-10 DIAGNOSIS — E11.42 TYPE 2 DIABETES MELLITUS WITH DIABETIC POLYNEUROPATHY, WITHOUT LONG-TERM CURRENT USE OF INSULIN (HCC): ICD-10-CM

## 2018-07-10 DIAGNOSIS — E66.01 MORBID OBESITY DUE TO EXCESS CALORIES (HCC): ICD-10-CM

## 2018-07-10 PROCEDURE — 99214 OFFICE O/P EST MOD 30 MIN: CPT | Performed by: INTERNAL MEDICINE

## 2018-07-10 PROCEDURE — 96372 THER/PROPH/DIAG INJ SC/IM: CPT | Performed by: INTERNAL MEDICINE

## 2018-07-10 RX ORDER — CYANOCOBALAMIN 1000 UG/ML
1000 INJECTION INTRAMUSCULAR; SUBCUTANEOUS ONCE
Status: COMPLETED | OUTPATIENT
Start: 2018-07-10 | End: 2018-07-10

## 2018-07-10 RX ADMIN — CYANOCOBALAMIN 1000 MCG: 1000 INJECTION INTRAMUSCULAR; SUBCUTANEOUS at 12:24:00

## 2018-07-10 NOTE — TELEPHONE ENCOUNTER
Patient is requesting a refill on Acetaminaphen-Codeine#3 and Zolpidem Tartrate 5mg.  Would like a paper prescription

## 2018-07-10 NOTE — PROGRESS NOTES
CC: Patient presents with:  Weight Check: same        HPI:     Weight is the same from previous. Feels that food is just too good. Summer is tough for her from sweets standpoint. Feeling hunger and cravings. Has been taking medicine as directed. CAPSULE BY MOUTH FOUR TIMES DAILY BEFORE MEALS AND AT BEDTIME Disp: 120 capsule Rfl: 0   aspirin (ASPIR-81) 81 MG Oral Tab EC Take 1 tablet (81 mg total) by mouth daily.  Disp: 1 tablet Rfl: 0      Past Medical History:   Diagnosis Date   • Anxiety    • Col defined types were placed in this encounter. Signed Prescriptions Disp Refills    Empagliflozin (JARDIANCE) 25 MG Oral Tab 30 tablet 2      Sig: Take 1 tablet by mouth every morning.           None     ASSESSMENT:   Therapeutic drug monitoring  (prima

## 2018-07-11 ENCOUNTER — OFFICE VISIT (OUTPATIENT)
Dept: INTERNAL MEDICINE CLINIC | Facility: CLINIC | Age: 68
End: 2018-07-11

## 2018-07-11 ENCOUNTER — HOSPITAL ENCOUNTER (OUTPATIENT)
Dept: CT IMAGING | Age: 68
Discharge: HOME | End: 2018-07-11
Attending: FAMILY MEDICINE
Payer: MEDICARE

## 2018-07-11 ENCOUNTER — TELEPHONE (OUTPATIENT)
Dept: INTERNAL MEDICINE CLINIC | Facility: CLINIC | Age: 68
End: 2018-07-11

## 2018-07-11 VITALS
DIASTOLIC BLOOD PRESSURE: 64 MMHG | WEIGHT: 250 LBS | OXYGEN SATURATION: 98 % | SYSTOLIC BLOOD PRESSURE: 96 MMHG | HEART RATE: 86 BPM | RESPIRATION RATE: 16 BRPM | HEIGHT: 59 IN | TEMPERATURE: 98 F | BODY MASS INDEX: 50.4 KG/M2

## 2018-07-11 DIAGNOSIS — E11.42 TYPE 2 DIABETES MELLITUS WITH DIABETIC POLYNEUROPATHY, WITHOUT LONG-TERM CURRENT USE OF INSULIN (HCC): ICD-10-CM

## 2018-07-11 DIAGNOSIS — R10.32 LEFT LOWER QUADRANT PAIN: ICD-10-CM

## 2018-07-11 DIAGNOSIS — R10.32 LEFT LOWER QUADRANT PAIN: Primary | ICD-10-CM

## 2018-07-11 LAB — CREAT SERPL-MCNC: 0.8 MG/DL (ref 0.55–1.02)

## 2018-07-11 PROCEDURE — 74177 CT ABD & PELVIS W/CONTRAST: CPT | Performed by: FAMILY MEDICINE

## 2018-07-11 PROCEDURE — 99214 OFFICE O/P EST MOD 30 MIN: CPT | Performed by: FAMILY MEDICINE

## 2018-07-11 PROCEDURE — 82565 ASSAY OF CREATININE: CPT

## 2018-07-11 RX ORDER — METRONIDAZOLE 500 MG/1
500 TABLET ORAL 3 TIMES DAILY
Qty: 30 TABLET | Refills: 0 | Status: SHIPPED | OUTPATIENT
Start: 2018-07-11 | End: 2018-12-05 | Stop reason: ALTCHOICE

## 2018-07-11 RX ORDER — CIPROFLOXACIN 500 MG/1
500 TABLET, FILM COATED ORAL 2 TIMES DAILY
Qty: 20 TABLET | Refills: 0 | Status: SHIPPED | OUTPATIENT
Start: 2018-07-11 | End: 2018-08-09

## 2018-07-11 RX ORDER — ACETAMINOPHEN AND CODEINE PHOSPHATE 300; 30 MG/1; MG/1
1 TABLET ORAL EVERY 4 HOURS PRN
Qty: 50 TABLET | Refills: 0 | Status: SHIPPED | OUTPATIENT
Start: 2018-07-11 | End: 2018-08-28

## 2018-07-11 RX ORDER — ZOLPIDEM TARTRATE 5 MG/1
TABLET ORAL
Qty: 30 TABLET | Refills: 0 | Status: SHIPPED | OUTPATIENT
Start: 2018-07-11 | End: 2018-08-28

## 2018-07-11 NOTE — IMAGING NOTE
Patient is aware not to take metformin for the next 48 hours. We discussed it, she expressed understanding and has the post CT instructions that we reviewed also.

## 2018-07-11 NOTE — PROGRESS NOTES
HPI:    Patient ID: Tish Ibrahim is a 78year old female.     HPI  3d now w gas pains   More LLQ  No fever  No nv   Has diarrhea,   mucousy    Appetite is less but able to eat    Used otc laxatives  wo much relief    No urine issues   No BPR   No meds u mouth daily. Disp: 1 tablet Rfl: 0   Empagliflozin (JARDIANCE) 25 MG Oral Tab Take 1 tablet by mouth every morning.  Disp: 30 tablet Rfl: 2     Allergies:  Zithromax               RASH, SWELLING    Comment:TABS  Shellfish               ITCHING, SWELLING   P

## 2018-07-11 NOTE — TELEPHONE ENCOUNTER
Mevelyn Sale is not covered by plan. The preferred alt is [place] or Shana Hipps. Dr Memo Mars will change back to [place]. New Rx pended.

## 2018-07-18 NOTE — TELEPHONE ENCOUNTER
LOV: 7/10/18 [facility]  -will dc farxiga and switch to jardiance     RTC: 4 weeks    (Kofi Mendez was not covered by insurance so patient was instructed to continue the farxiga 10mg)    Future Appointments  Date Time Provider Damon Pittman   8/9/2018 10:00

## 2018-07-19 ENCOUNTER — TELEPHONE (OUTPATIENT)
Dept: INTERNAL MEDICINE CLINIC | Facility: CLINIC | Age: 68
End: 2018-07-19

## 2018-07-19 RX ORDER — OMEPRAZOLE 20 MG/1
CAPSULE, DELAYED RELEASE ORAL
Qty: 90 CAPSULE | Refills: 0 | Status: SHIPPED | OUTPATIENT
Start: 2018-07-19 | End: 2018-10-19

## 2018-08-09 ENCOUNTER — OFFICE VISIT (OUTPATIENT)
Dept: INTERNAL MEDICINE CLINIC | Facility: CLINIC | Age: 68
End: 2018-08-09
Payer: MEDICARE

## 2018-08-09 VITALS
RESPIRATION RATE: 16 BRPM | HEART RATE: 86 BPM | BODY MASS INDEX: 51.2 KG/M2 | SYSTOLIC BLOOD PRESSURE: 122 MMHG | WEIGHT: 254 LBS | HEIGHT: 59 IN | DIASTOLIC BLOOD PRESSURE: 72 MMHG

## 2018-08-09 DIAGNOSIS — E66.01 MORBID OBESITY DUE TO EXCESS CALORIES (HCC): ICD-10-CM

## 2018-08-09 DIAGNOSIS — E53.8 VITAMIN B 12 DEFICIENCY: ICD-10-CM

## 2018-08-09 DIAGNOSIS — Z51.81 THERAPEUTIC DRUG MONITORING: Primary | ICD-10-CM

## 2018-08-09 DIAGNOSIS — E11.42 TYPE 2 DIABETES MELLITUS WITH DIABETIC POLYNEUROPATHY, WITHOUT LONG-TERM CURRENT USE OF INSULIN (HCC): ICD-10-CM

## 2018-08-09 PROCEDURE — 96372 THER/PROPH/DIAG INJ SC/IM: CPT | Performed by: INTERNAL MEDICINE

## 2018-08-09 PROCEDURE — 99214 OFFICE O/P EST MOD 30 MIN: CPT | Performed by: INTERNAL MEDICINE

## 2018-08-09 RX ORDER — CYANOCOBALAMIN 1000 UG/ML
1000 INJECTION INTRAMUSCULAR; SUBCUTANEOUS ONCE
Status: COMPLETED | OUTPATIENT
Start: 2018-08-09 | End: 2018-08-09

## 2018-08-09 RX ADMIN — CYANOCOBALAMIN 1000 MCG: 1000 INJECTION INTRAMUSCULAR; SUBCUTANEOUS at 10:51:00

## 2018-08-09 NOTE — PROGRESS NOTES
CC: Patient presents with:  Weight Check: up 4 lb       HPI:       Weight is up 4 lbs from previous   Is taking medication as directed but no further weight loss. Gaining weight. Having a lot of stress with weight gain over the past week.    Denies tablet Rfl: 1   MetFORMIN HCl 1000 MG Oral Tab TAKE 1 TABLET(1000 MG) BY MOUTH TWICE DAILY WITH MEALS Disp: 180 tablet Rfl: 1   DICYCLOMINE HCL 10 MG Oral Cap TAKE 1 CAPSULE BY MOUTH FOUR TIMES DAILY BEFORE MEALS AND AT BEDTIME Disp: 120 capsule Rfl: 0   a to auscultation bilaterally   CARDIO: RRR without murmur  GI: good BS's,NT/ND, no masses or HSM  EXTREMITIES: no cyanosis, no clubbing, no edema    No orders of the defined types were placed in this encounter.        No prescriptions requested or ordered in

## 2018-08-13 DIAGNOSIS — E11.42 TYPE 2 DIABETES MELLITUS WITH DIABETIC POLYNEUROPATHY, WITHOUT LONG-TERM CURRENT USE OF INSULIN (HCC): ICD-10-CM

## 2018-08-13 RX ORDER — SPIRONOLACTONE 25 MG/1
TABLET ORAL
Qty: 90 TABLET | Refills: 0 | Status: SHIPPED | OUTPATIENT
Start: 2018-08-13 | End: 2018-11-23

## 2018-08-13 RX ORDER — PRAVASTATIN SODIUM 20 MG
TABLET ORAL
Qty: 90 TABLET | Refills: 0 | Status: SHIPPED | OUTPATIENT
Start: 2018-08-13 | End: 2018-11-23

## 2018-08-13 NOTE — TELEPHONE ENCOUNTER
Requestin day supply    Pending Prescriptions Disp Refills    Dapagliflozin Propanediol (FARXIGA) 10 MG Oral Tab 90 tablet 0     Sig: Take 10 mg by mouth daily.        LOV: 18  RTC: 4 weeks  Filled: 18 # 30 with 2 refills    Future Appointment

## 2018-08-28 NOTE — TELEPHONE ENCOUNTER
Medication(s) to Refill:   Pending Prescriptions Disp Refills    ACETAMINOPHEN-CODEINE #3 300-30 MG Oral Tab [Pharmacy Med Name: ACETAMINOPHEN/COD #3 (300/30MG) TAB] 50 tablet 0     Sig: TAKE 1 TABLET BY MOUTH EVERY 4 HOURS AS NEEDED FOR PAIN      ZOLPIDEM

## 2018-08-29 RX ORDER — ACETAMINOPHEN AND CODEINE PHOSPHATE 300; 30 MG/1; MG/1
TABLET ORAL
Qty: 50 TABLET | Refills: 0 | Status: SHIPPED | OUTPATIENT
Start: 2018-08-29 | End: 2018-10-02

## 2018-08-29 RX ORDER — ZOLPIDEM TARTRATE 5 MG/1
TABLET ORAL
Qty: 30 TABLET | Refills: 0 | Status: SHIPPED | OUTPATIENT
Start: 2018-08-29 | End: 2018-10-02

## 2018-09-18 ENCOUNTER — DOCUMENTATION ONLY (OUTPATIENT)
Dept: INTERNAL MEDICINE CLINIC | Facility: CLINIC | Age: 68
End: 2018-09-18

## 2018-09-18 ENCOUNTER — TELEPHONE (OUTPATIENT)
Dept: INTERNAL MEDICINE CLINIC | Facility: CLINIC | Age: 68
End: 2018-09-18

## 2018-10-03 RX ORDER — ZOLPIDEM TARTRATE 5 MG/1
TABLET ORAL
Qty: 30 TABLET | Refills: 0 | Status: SHIPPED | OUTPATIENT
Start: 2018-10-03 | End: 2018-11-06

## 2018-10-03 RX ORDER — ACETAMINOPHEN AND CODEINE PHOSPHATE 300; 30 MG/1; MG/1
TABLET ORAL
Qty: 50 TABLET | Refills: 0 | Status: SHIPPED | OUTPATIENT
Start: 2018-10-03 | End: 2018-11-06

## 2018-10-03 RX ORDER — NORTRIPTYLINE HYDROCHLORIDE 75 MG/1
CAPSULE ORAL
Qty: 90 CAPSULE | Refills: 0 | OUTPATIENT
Start: 2018-10-03

## 2018-10-03 NOTE — TELEPHONE ENCOUNTER
Acetaminophen -Codeine #3 filled 8/29/18 #30    Zolpidem filled  8/29/18 #30    LOV  7/11/18 - Acute visit

## 2018-10-04 RX ORDER — NORTRIPTYLINE HYDROCHLORIDE 25 MG/1
CAPSULE ORAL
Qty: 90 CAPSULE | Refills: 0 | Status: SHIPPED | OUTPATIENT
Start: 2018-10-04 | End: 2019-01-05

## 2018-10-19 RX ORDER — OMEPRAZOLE 20 MG/1
CAPSULE, DELAYED RELEASE ORAL
Qty: 90 CAPSULE | Refills: 0 | Status: SHIPPED | OUTPATIENT
Start: 2018-10-19 | End: 2019-01-22

## 2018-10-19 NOTE — TELEPHONE ENCOUNTER
OMEPRAZOLE 20 MG 1 CAP DAILY FILLED 7-19-18 90 with 0 refills     LOV 7-11-18     No upcoming apt on file     Labs 4-16-18     Does not pass protocol

## 2018-10-24 RX ORDER — DICYCLOMINE HYDROCHLORIDE 10 MG/1
CAPSULE ORAL
Qty: 120 CAPSULE | Refills: 0 | Status: SHIPPED | OUTPATIENT
Start: 2018-10-24 | End: 2018-11-23

## 2018-11-02 NOTE — TELEPHONE ENCOUNTER
Requesting topiramate 50 MG Oral Tab     LOV: 8/8/18  RTC: 4 weeks    Last Relevant Labs:   Filled: 6/12/18  #180 with 0 refills    No future appointments.

## 2018-11-05 RX ORDER — TOPIRAMATE 50 MG/1
TABLET, FILM COATED ORAL
Qty: 180 TABLET | Refills: 0 | Status: SHIPPED | OUTPATIENT
Start: 2018-11-05 | End: 2019-04-01

## 2018-11-06 RX ORDER — ACETAMINOPHEN AND CODEINE PHOSPHATE 300; 30 MG/1; MG/1
TABLET ORAL
Qty: 50 TABLET | Refills: 0 | Status: SHIPPED | OUTPATIENT
Start: 2018-11-06 | End: 2018-12-05

## 2018-11-06 RX ORDER — ZOLPIDEM TARTRATE 5 MG/1
TABLET ORAL
Qty: 30 TABLET | Refills: 0 | Status: SHIPPED | OUTPATIENT
Start: 2018-11-06 | End: 2018-12-05

## 2018-11-06 NOTE — TELEPHONE ENCOUNTER
Last refill: 10/3/2018 # 30 for Zolpidem   LOV: 08/09/18    Last refill: 10/3/2018 # 50 for Acetaminophen-Codeine  LOV: 08/09/2018

## 2018-11-23 RX ORDER — DICYCLOMINE HYDROCHLORIDE 10 MG/1
CAPSULE ORAL
Qty: 120 CAPSULE | Refills: 0 | Status: SHIPPED | OUTPATIENT
Start: 2018-11-23 | End: 2018-12-27

## 2018-11-24 DIAGNOSIS — E11.42 TYPE 2 DIABETES MELLITUS WITH DIABETIC POLYNEUROPATHY, WITHOUT LONG-TERM CURRENT USE OF INSULIN (HCC): ICD-10-CM

## 2018-11-26 RX ORDER — DAPAGLIFLOZIN 10 MG/1
TABLET, FILM COATED ORAL
Qty: 90 TABLET | Refills: 0 | Status: SHIPPED | OUTPATIENT
Start: 2018-11-26 | End: 2019-02-28

## 2018-11-26 RX ORDER — SPIRONOLACTONE 25 MG/1
TABLET ORAL
Qty: 90 TABLET | Refills: 0 | Status: SHIPPED | OUTPATIENT
Start: 2018-11-26 | End: 2019-03-21

## 2018-11-26 RX ORDER — PRAVASTATIN SODIUM 20 MG
TABLET ORAL
Qty: 90 TABLET | Refills: 0 | Status: SHIPPED | OUTPATIENT
Start: 2018-11-26 | End: 2019-06-10

## 2018-11-26 NOTE — TELEPHONE ENCOUNTER
Requesting   Requested Prescriptions     Pending Prescriptions Disp Refills   • FARXIGA 10 MG Oral Tab [Pharmacy Med Name: FARXIGA 10MG TABLETS] 90 tablet 0     Sig: TAKE 1 TABLET BY MOUTH DAILY     LOV: 8/9/18  RTC: 4 weeks  Filled: 8/13/18 #90 with 0 ref

## 2018-11-26 NOTE — TELEPHONE ENCOUNTER
Refill requested:   Requested Prescriptions     Pending Prescriptions Disp Refills   • SPIRONOLACTONE 25 MG Oral Tab [Pharmacy Med Name: SPIRONOLACTONE 25MG TABLETS] 90 tablet 0     Sig: TAKE 1 TABLET BY MOUTH DAILY   • PRAVASTATIN SODIUM 20 MG Oral Tab [P

## 2018-12-05 ENCOUNTER — APPOINTMENT (OUTPATIENT)
Dept: LAB | Age: 68
End: 2018-12-05
Attending: FAMILY MEDICINE
Payer: MEDICARE

## 2018-12-05 ENCOUNTER — OFFICE VISIT (OUTPATIENT)
Dept: INTERNAL MEDICINE CLINIC | Facility: CLINIC | Age: 68
End: 2018-12-05
Payer: MEDICARE

## 2018-12-05 VITALS
HEIGHT: 59 IN | SYSTOLIC BLOOD PRESSURE: 124 MMHG | BODY MASS INDEX: 51.31 KG/M2 | WEIGHT: 254.5 LBS | HEART RATE: 87 BPM | TEMPERATURE: 98 F | DIASTOLIC BLOOD PRESSURE: 76 MMHG | RESPIRATION RATE: 16 BRPM | OXYGEN SATURATION: 98 %

## 2018-12-05 DIAGNOSIS — E78.00 PURE HYPERCHOLESTEROLEMIA: ICD-10-CM

## 2018-12-05 DIAGNOSIS — E53.8 B12 DEFICIENCY: ICD-10-CM

## 2018-12-05 DIAGNOSIS — I10 ESSENTIAL HYPERTENSION, BENIGN: ICD-10-CM

## 2018-12-05 DIAGNOSIS — E55.9 VITAMIN D DEFICIENCY: ICD-10-CM

## 2018-12-05 DIAGNOSIS — E11.42 TYPE 2 DIABETES MELLITUS WITH DIABETIC POLYNEUROPATHY, WITHOUT LONG-TERM CURRENT USE OF INSULIN (HCC): ICD-10-CM

## 2018-12-05 DIAGNOSIS — H93.13 TINNITUS OF BOTH EARS: ICD-10-CM

## 2018-12-05 DIAGNOSIS — Z13.820 ENCOUNTER FOR SCREENING FOR OSTEOPOROSIS: ICD-10-CM

## 2018-12-05 DIAGNOSIS — Z23 NEED FOR INFLUENZA VACCINATION: Primary | ICD-10-CM

## 2018-12-05 DIAGNOSIS — E66.01 MORBID OBESITY DUE TO EXCESS CALORIES (HCC): ICD-10-CM

## 2018-12-05 DIAGNOSIS — Z12.39 SCREENING FOR BREAST CANCER: ICD-10-CM

## 2018-12-05 PROCEDURE — 82607 VITAMIN B-12: CPT

## 2018-12-05 PROCEDURE — 90653 IIV ADJUVANT VACCINE IM: CPT | Performed by: FAMILY MEDICINE

## 2018-12-05 PROCEDURE — 36415 COLL VENOUS BLD VENIPUNCTURE: CPT

## 2018-12-05 PROCEDURE — 83036 HEMOGLOBIN GLYCOSYLATED A1C: CPT

## 2018-12-05 PROCEDURE — 99214 OFFICE O/P EST MOD 30 MIN: CPT | Performed by: FAMILY MEDICINE

## 2018-12-05 PROCEDURE — G0008 ADMIN INFLUENZA VIRUS VAC: HCPCS | Performed by: FAMILY MEDICINE

## 2018-12-05 PROCEDURE — 80061 LIPID PANEL: CPT

## 2018-12-05 PROCEDURE — 82306 VITAMIN D 25 HYDROXY: CPT

## 2018-12-05 PROCEDURE — 80053 COMPREHEN METABOLIC PANEL: CPT

## 2018-12-05 RX ORDER — ACETAMINOPHEN AND CODEINE PHOSPHATE 300; 30 MG/1; MG/1
1 TABLET ORAL EVERY 4 HOURS PRN
Qty: 50 TABLET | Refills: 0 | Status: CANCELLED | OUTPATIENT
Start: 2018-12-05

## 2018-12-05 RX ORDER — ZOLPIDEM TARTRATE 5 MG/1
5 TABLET ORAL NIGHTLY PRN
Qty: 30 TABLET | Refills: 0 | Status: CANCELLED | OUTPATIENT
Start: 2018-12-05

## 2018-12-05 RX ORDER — PREGABALIN 150 MG/1
150 CAPSULE ORAL 2 TIMES DAILY
Qty: 180 CAPSULE | Refills: 0 | Status: CANCELLED | OUTPATIENT
Start: 2018-12-05

## 2018-12-05 RX ORDER — PREGABALIN 150 MG/1
150 CAPSULE ORAL 2 TIMES DAILY
Qty: 180 CAPSULE | Refills: 3 | Status: SHIPPED | OUTPATIENT
Start: 2018-12-05 | End: 2019-06-19

## 2018-12-05 RX ORDER — ZOLPIDEM TARTRATE 5 MG/1
TABLET ORAL
Qty: 30 TABLET | Refills: 3 | Status: SHIPPED | OUTPATIENT
Start: 2018-12-05 | End: 2019-04-27

## 2018-12-05 RX ORDER — ACETAMINOPHEN AND CODEINE PHOSPHATE 300; 30 MG/1; MG/1
TABLET ORAL
Qty: 50 TABLET | Refills: 0 | Status: SHIPPED | OUTPATIENT
Start: 2018-12-05 | End: 2019-01-14

## 2018-12-05 NOTE — PROGRESS NOTES
HPI:    Patient ID: Andrés Pichardo is a 80year old female. HPI  Andrés Pichardo is a 80year old female.   HPI:   Here for med ck  Is fasting    Taking meds as directed    Has not been to  The [facility]       Would like B12 cked    S/p shots    Also Box Rfl: 3   MetFORMIN HCl 1000 MG Oral Tab TAKE 1 TABLET(1000 MG) BY MOUTH TWICE DAILY WITH MEALS Disp: 180 tablet Rfl: 1   aspirin (ASPIR-81) 81 MG Oral Tab EC Take 1 tablet (81 mg total) by mouth daily.  Disp: 1 tablet Rfl: 0      Past Medical History: murmur  EXTREMITIES: no edema    ASSESSMENT AND PLAN:   (Z23) Need for influenza vaccination  (primary encounter diagnosis)  Plan: FLU VACCINE ADJUVANT IM        HD flu shot today       (Z12.31) Screening for breast cancer  Plan: [facility] SCREENING BILAT (CPT=7 180 tablet Rfl: 0   OMEPRAZOLE 20 MG Oral Capsule Delayed Release TAKE 1 CAPSULE(20 MG) BY MOUTH DAILY Disp: 90 capsule Rfl: 0   Nortriptyline HCl 25 MG Oral Cap TAKE ONE CAPSULE BY MOUTH EVERY NIGHT AT BEDTIME Disp: 90 capsule Rfl: 0   LYRICA 150 MG Oral

## 2018-12-17 ENCOUNTER — OFFICE VISIT (OUTPATIENT)
Dept: INTERNAL MEDICINE CLINIC | Facility: CLINIC | Age: 68
End: 2018-12-17
Payer: MEDICARE

## 2018-12-17 VITALS
TEMPERATURE: 97 F | RESPIRATION RATE: 18 BRPM | WEIGHT: 251 LBS | HEIGHT: 59 IN | DIASTOLIC BLOOD PRESSURE: 82 MMHG | BODY MASS INDEX: 50.6 KG/M2 | HEART RATE: 88 BPM | SYSTOLIC BLOOD PRESSURE: 136 MMHG

## 2018-12-17 DIAGNOSIS — N89.8 VAGINAL DRYNESS: ICD-10-CM

## 2018-12-17 DIAGNOSIS — B37.3 YEAST VAGINITIS: ICD-10-CM

## 2018-12-17 DIAGNOSIS — R35.0 URINARY FREQUENCY: Primary | ICD-10-CM

## 2018-12-17 PROCEDURE — 81003 URINALYSIS AUTO W/O SCOPE: CPT | Performed by: FAMILY MEDICINE

## 2018-12-17 PROCEDURE — 87086 URINE CULTURE/COLONY COUNT: CPT | Performed by: FAMILY MEDICINE

## 2018-12-17 PROCEDURE — 99213 OFFICE O/P EST LOW 20 MIN: CPT | Performed by: FAMILY MEDICINE

## 2018-12-17 RX ORDER — NYSTATIN 100000 U/G
1 CREAM TOPICAL 2 TIMES DAILY
Qty: 60 G | Refills: 0 | Status: SHIPPED | OUTPATIENT
Start: 2018-12-17 | End: 2020-05-14

## 2018-12-17 RX ORDER — FLUCONAZOLE 150 MG/1
TABLET ORAL
Qty: 6 TABLET | Refills: 0 | Status: SHIPPED | OUTPATIENT
Start: 2018-12-17 | End: 2019-03-19

## 2018-12-17 RX ORDER — NYSTATIN 100000 U/G
1 CREAM TOPICAL 2 TIMES DAILY
Qty: 60 G | Refills: 0 | Status: SHIPPED | OUTPATIENT
Start: 2018-12-17 | End: 2018-12-17 | Stop reason: CLARIF

## 2018-12-17 RX ORDER — FLUCONAZOLE 150 MG/1
TABLET ORAL
Qty: 6 TABLET | Refills: 1 | Status: SHIPPED | OUTPATIENT
Start: 2018-12-17 | End: 2018-12-17 | Stop reason: CLARIF

## 2018-12-17 NOTE — PROGRESS NOTES
CHIEF COMPLAINT:   Patient presents with:  UTI: symptoms started last week. Lots of buring when urinating. HPI:   Yaima Cuba is a 80year old female who presents with symptoms of UTI.  Complaining of urinary frequency, urgency, dysuria for las Oral Cap TAKE ONE CAPSULE BY MOUTH EVERY NIGHT AT BEDTIME Disp: 90 capsule Rfl: 0   Liraglutide (VICTOZA) 18 MG/3ML Subcutaneous Solution Pen-injector ADMINISTER 1.8 MG UNDER THE SKIN DAILY Disp: 27 mL Rfl: 0   ONETOUCH ULTRA BLUE In Vitro Strip Test sugar wounds or ulcers. EYES:denies blurred vision or double vision  HEENT: no congestion, rhinorrhea, sore throat or ear pain  CARDIOVASCULAR: denies chest pain or palpitations  LUNGS: denies shortness of breath, cough, or wheezing  GI: See HPI. No N/V/C/D. Future  - URINE CULTURE, ROUTINE    2. Yeast vaginitis  - Pt to use KY jelly or Replens when she has sex so it is not as irritating and painful for her.  - fluconazole (DIFLUCAN) 150 MG Oral Tab;  Take one tablet daily for 3 days and then repeat in one week

## 2018-12-27 RX ORDER — DICYCLOMINE HYDROCHLORIDE 10 MG/1
CAPSULE ORAL
Qty: 120 CAPSULE | Refills: 0 | Status: SHIPPED | OUTPATIENT
Start: 2018-12-27 | End: 2019-04-24

## 2018-12-27 NOTE — TELEPHONE ENCOUNTER
Refill requested:   Requested Prescriptions     Pending Prescriptions Disp Refills   • DICYCLOMINE HCL 10 MG Oral Cap [Pharmacy Med Name: DICYCLOMINE 10MG CAPSULES] 120 capsule 0     Sig: TAKE 1 CAPSULE BY MOUTH FOUR TIMES DAILY BEFORE MEALS AND AT BEDTIME

## 2019-01-07 RX ORDER — NORTRIPTYLINE HYDROCHLORIDE 25 MG/1
CAPSULE ORAL
Qty: 90 CAPSULE | Refills: 1 | Status: SHIPPED | OUTPATIENT
Start: 2019-01-07 | End: 2019-06-23

## 2019-01-14 RX ORDER — ACETAMINOPHEN AND CODEINE PHOSPHATE 300; 30 MG/1; MG/1
TABLET ORAL
Qty: 50 TABLET | Refills: 0 | Status: SHIPPED | OUTPATIENT
Start: 2019-01-14 | End: 2019-02-19

## 2019-01-22 RX ORDER — OMEPRAZOLE 20 MG/1
CAPSULE, DELAYED RELEASE ORAL
Qty: 90 CAPSULE | Refills: 0 | Status: SHIPPED | OUTPATIENT
Start: 2019-01-22 | End: 2019-04-20

## 2019-01-22 NOTE — TELEPHONE ENCOUNTER
Omeprazole 20MG  Last OV relevant to medication: 12/5/18  Last refill date: 10/19/18  #/refills: 0  When pt was asked to return for OV: none  Upcoming appt/reason: none  Recent labs: 12-17-18: UA/Urine Culture   12-5-18: Vit. B12/ Vit. D/ HgBA1C/ Lipid/ CMP

## 2019-01-28 ENCOUNTER — TELEPHONE (OUTPATIENT)
Dept: INTERNAL MEDICINE CLINIC | Facility: CLINIC | Age: 69
End: 2019-01-28

## 2019-01-28 DIAGNOSIS — Z78.0 ASYMPTOMATIC POSTMENOPAUSAL STATUS (AGE-RELATED) (NATURAL): Primary | ICD-10-CM

## 2019-01-28 NOTE — TELEPHONE ENCOUNTER
Patient has Outpatient procedure scheduled tomorrow and ABN is not passing protocol; please fax new order with new diagnosis today; call with any questions

## 2019-01-29 ENCOUNTER — HOSPITAL ENCOUNTER (OUTPATIENT)
Dept: BONE DENSITY | Age: 69
Discharge: HOME | End: 2019-01-29
Attending: FAMILY MEDICINE
Payer: MEDICARE

## 2019-01-29 ENCOUNTER — HOSPITAL ENCOUNTER (OUTPATIENT)
Dept: MAMMOGRAPHY | Age: 69
Discharge: HOME | End: 2019-01-29
Attending: FAMILY MEDICINE
Payer: MEDICARE

## 2019-01-29 DIAGNOSIS — Z78.0 ASYMPTOMATIC POSTMENOPAUSAL STATUS (AGE-RELATED) (NATURAL): ICD-10-CM

## 2019-01-29 DIAGNOSIS — Z12.39 SCREENING FOR BREAST CANCER: ICD-10-CM

## 2019-01-29 PROCEDURE — 77063 BREAST TOMOSYNTHESIS BI: CPT | Performed by: FAMILY MEDICINE

## 2019-01-29 PROCEDURE — 77080 DXA BONE DENSITY AXIAL: CPT | Performed by: FAMILY MEDICINE

## 2019-01-29 PROCEDURE — 77067 SCR MAMMO BI INCL CAD: CPT | Performed by: FAMILY MEDICINE

## 2019-02-11 ENCOUNTER — TELEPHONE (OUTPATIENT)
Dept: INTERNAL MEDICINE CLINIC | Facility: CLINIC | Age: 69
End: 2019-02-11

## 2019-02-11 NOTE — TELEPHONE ENCOUNTER
Pt Big tor R foot very brittle and hard coming loose also has black spit under nail No Soreness or redness Req to see podiatry Has been trimming her own nails but since DM want s a podiatrist

## 2019-02-13 NOTE — TELEPHONE ENCOUNTER
Pt was given a foot specialist to see but do not take her insurance and wants another recommendation

## 2019-02-15 RX ORDER — TOPIRAMATE 50 MG/1
TABLET, FILM COATED ORAL
Qty: 180 TABLET | Refills: 0 | OUTPATIENT
Start: 2019-02-15

## 2019-02-15 NOTE — TELEPHONE ENCOUNTER
Requesting topiramate  LOV: 8/9/18  RTC: 4 weeks  Last Relevant Labs: n/a  Filled: 11/5/18 #180 with 0 refills    Patient needs appt. Patient informed. She scheduled for first available morning. She does not need a refill at this point.   Future Appointments   Date Time Provider Damon Pittman   2/21/2019 10:45 AM Andrzej Rankin DPM [id number] [facility]   3/19/2019 10:00 AM Luare Garcia MD [id number] EM[facility] [address]

## 2019-02-19 DIAGNOSIS — E11.42 TYPE 2 DIABETES MELLITUS WITH DIABETIC POLYNEUROPATHY, WITHOUT LONG-TERM CURRENT USE OF INSULIN (HCC): ICD-10-CM

## 2019-02-19 NOTE — TELEPHONE ENCOUNTER
Acetaminophen-Codeine  Last OV relevant to medication: 9-14-15 [facility]) f/u 9-25-15  Last refill date: 1-14-19   #/refills: 0  When pt was asked to return for OV: none  Upcoming appt/reason: none  Recent labs: 12-17-18: UA/ Urine culture

## 2019-02-20 RX ORDER — ACETAMINOPHEN AND CODEINE PHOSPHATE 300; 30 MG/1; MG/1
TABLET ORAL
Qty: 50 TABLET | Refills: 0 | Status: SHIPPED | OUTPATIENT
Start: 2019-02-20 | End: 2019-03-20

## 2019-02-20 NOTE — TELEPHONE ENCOUNTER
Requesting topamax   LOV: 8/9/18  RTC: 4 weeks  Filled: 11/5/18 #180 with 0 refills    Future Appointments   Date Time Provider Damon Pittman   2/21/2019 10:45 AM Janessa Dotson DPM [id number] [facility]   3/19/2019 10:00 AM Jeremy Hernandez MD [facility]

## 2019-02-21 ENCOUNTER — OFFICE VISIT (OUTPATIENT)
Dept: PODIATRY CLINIC | Facility: CLINIC | Age: 69
End: 2019-02-21
Payer: MEDICARE

## 2019-02-21 ENCOUNTER — TELEPHONE (OUTPATIENT)
Dept: INTERNAL MEDICINE CLINIC | Facility: CLINIC | Age: 69
End: 2019-02-21

## 2019-02-21 DIAGNOSIS — E11.42 DIABETIC POLYNEUROPATHY ASSOCIATED WITH TYPE 2 DIABETES MELLITUS (HCC): Primary | ICD-10-CM

## 2019-02-21 DIAGNOSIS — L60.0 ONYCHOCRYPTOSIS: ICD-10-CM

## 2019-02-21 DIAGNOSIS — B35.1 ONYCHOMYCOSIS: ICD-10-CM

## 2019-02-21 PROCEDURE — 99203 OFFICE O/P NEW LOW 30 MIN: CPT | Performed by: PODIATRIST

## 2019-02-25 RX ORDER — TOPIRAMATE 50 MG/1
TABLET, FILM COATED ORAL
Qty: 60 TABLET | Refills: 0 | Status: SHIPPED | OUTPATIENT
Start: 2019-02-25 | End: 2019-03-19

## 2019-02-25 NOTE — PROGRESS NOTES
Bertha Chan is a 80year old female. Patient presents with:  Consult: Pt is here for right great toe. Discolored under the nail and also problems with right 5th toe. On the right foot also great toe. turning dark buy the nail bed.  Is harder for her to Zolpidem Tartrate 5 MG Oral Tab TAKE 1 TABLET BY MOUTH EVERY DAY AT BEDTIME AS NEEDED Disp: 30 tablet Rfl: 3   SPIRONOLACTONE 25 MG Oral Tab TAKE 1 TABLET BY MOUTH DAILY Disp: 90 tablet Rfl: 0   PRAVASTATIN SODIUM 20 MG Oral Tab TAKE 1 TABLET BY MOUTH EV       x3   • COLONOSCOPY  2012    rck 10 yrs   • HEMORRHOIDECTOMY     • HERNIA SURGERY     • HERNIA SURGERY      abdominal hernia repair   • KNEE REPLACEMENT SURGERY  2010, 2010    lt in 2010, rt in 2010   • LAMINECTOMY,LUMBAR      L4 L5 limits. The toenails however are thickened and dystrophic.   There is discoloration at the epionychium area on the hallux nails however I reassured this patient that this can be a normal finding and as long as there is no drainage or other signs of infecti

## 2019-02-28 DIAGNOSIS — E11.42 TYPE 2 DIABETES MELLITUS WITH DIABETIC POLYNEUROPATHY, WITHOUT LONG-TERM CURRENT USE OF INSULIN (HCC): ICD-10-CM

## 2019-02-28 RX ORDER — DAPAGLIFLOZIN 10 MG/1
TABLET, FILM COATED ORAL
Qty: 90 TABLET | Refills: 0 | Status: SHIPPED | OUTPATIENT
Start: 2019-02-28 | End: 2019-04-01

## 2019-02-28 NOTE — TELEPHONE ENCOUNTER
Requesting Farxiga  LOV: 8/9/18  RTC: 4 weeks  Last Relevant Labs: 12/5/18  Filled: 11/26/18 #90 with 0 refills    Future Appointments   Date Time Provider Damon Pittman   3/19/2019 10:00 AM Hector Jane MD [address]   5/21/2019 10:35 AM Boub[place]ar

## 2019-03-06 RX ORDER — SPIRONOLACTONE 25 MG/1
TABLET ORAL
Qty: 90 TABLET | Refills: 0 | Status: SHIPPED | OUTPATIENT
Start: 2019-03-06 | End: 2019-03-19

## 2019-03-06 RX ORDER — PRAVASTATIN SODIUM 20 MG
TABLET ORAL
Qty: 90 TABLET | Refills: 0 | Status: SHIPPED | OUTPATIENT
Start: 2019-03-06 | End: 2019-03-19

## 2019-03-06 NOTE — TELEPHONE ENCOUNTER
Pravastatin approved per protocol  Last OV relevant to medication: 12-5-18  Last refill date: 11-26-18  #/refills: 0  When pt was asked to return for OV: none  Upcoming appt/reason: none  Recent labs: 12-17-18: UA/ Urine culture  12-5-18: Vit. B12/ Vit. D/ H

## 2019-03-13 ENCOUNTER — TELEPHONE (OUTPATIENT)
Dept: INTERNAL MEDICINE CLINIC | Facility: CLINIC | Age: 69
End: 2019-03-13

## 2019-03-19 ENCOUNTER — TELEPHONE (OUTPATIENT)
Dept: INTERNAL MEDICINE CLINIC | Facility: CLINIC | Age: 69
End: 2019-03-19

## 2019-03-19 ENCOUNTER — OFFICE VISIT (OUTPATIENT)
Dept: INTERNAL MEDICINE CLINIC | Facility: CLINIC | Age: 69
End: 2019-03-19
Payer: MEDICARE

## 2019-03-19 VITALS
WEIGHT: 250.63 LBS | SYSTOLIC BLOOD PRESSURE: 112 MMHG | BODY MASS INDEX: 50.52 KG/M2 | HEART RATE: 92 BPM | DIASTOLIC BLOOD PRESSURE: 70 MMHG | RESPIRATION RATE: 16 BRPM | HEIGHT: 59 IN

## 2019-03-19 DIAGNOSIS — R94.31 ABNORMAL EKG: ICD-10-CM

## 2019-03-19 DIAGNOSIS — E66.01 MORBID OBESITY DUE TO EXCESS CALORIES (HCC): ICD-10-CM

## 2019-03-19 DIAGNOSIS — R07.9 CHEST PAIN, UNSPECIFIED TYPE: ICD-10-CM

## 2019-03-19 DIAGNOSIS — Z51.81 THERAPEUTIC DRUG MONITORING: Primary | ICD-10-CM

## 2019-03-19 DIAGNOSIS — E11.42 TYPE 2 DIABETES MELLITUS WITH DIABETIC POLYNEUROPATHY, WITHOUT LONG-TERM CURRENT USE OF INSULIN (HCC): ICD-10-CM

## 2019-03-19 DIAGNOSIS — R26.9 GAIT DISTURBANCE: ICD-10-CM

## 2019-03-19 DIAGNOSIS — R68.89 DECREASED EXERCISE TOLERANCE: ICD-10-CM

## 2019-03-19 PROCEDURE — 99214 OFFICE O/P EST MOD 30 MIN: CPT | Performed by: INTERNAL MEDICINE

## 2019-03-19 RX ORDER — TOPIRAMATE 50 MG/1
TABLET, FILM COATED ORAL
Qty: 180 TABLET | Refills: 0 | Status: CANCELLED | OUTPATIENT
Start: 2019-03-19

## 2019-03-19 NOTE — TELEPHONE ENCOUNTER
Z51.81    Type 2 diabetes mellitus with diabetic polyneuropathy, without long-term current use of insulin ([facility])  E11.42    Morbid obesity due to excess calories ([facility])  E66.01    Chest pain, unspecified type  R07.9    Abnormal EKG  R94.31    Decreased ex

## 2019-03-19 NOTE — PROGRESS NOTES
CC: Patient presents with:  Weight Check       HPI:     Returns today for weight check  States has not been taking her medicine since January   Has been having increasing frequency and chest pain or  Pressure  Feels like an elephant sitting on her ches strip Rfl: 3   ONETOUCH DELICA LANCETS 35U Does not apply Misc USE 1 LANCET BY FINGER STICK ROUTE DAILY Disp: 100 each Rfl: 3   Insulin Pen Needle (BD PEN NEEDLE ANDRAE U/F) 32G X 4 MM Does not apply Misc Inject 1 pen into the skin daily.  Disp: 1 Box Rfl: 3 well nourished,in no apparent distress, A/O x3  SKIN: no rashes,no suspicious lesions  HEENT: atraumatic, normocephalic, OP-clear, PERRLA  NECK: supple,no adenopathy  LUNGS: clear to auscultation bilaterally   CARDIO: RRR without murmur  GI: good BS's,NT/N

## 2019-03-19 NOTE — TELEPHONE ENCOUNTER
Patient given written directions on what to do prior to testing scheduled for tomorrow at [facility] at 1 pm.  She verbalized understanding with intent to comply. Patient is not currently having any chest pain.   I did inform her that if her chest javier

## 2019-03-19 NOTE — TELEPHONE ENCOUNTER
Prior Authorizations for [employer]   • Insurance Information: Tess Kwok, [birth year], [id number]  CPT code for test: PPZ=85663/31129/  • Diagnosis codes: listed below  • NPI of Ordering provider [id number]  • Clinical information/ notes:  • Any Pre

## 2019-03-20 ENCOUNTER — TELEPHONE (OUTPATIENT)
Dept: INTERNAL MEDICINE CLINIC | Facility: CLINIC | Age: 69
End: 2019-03-20

## 2019-03-20 RX ORDER — TOPIRAMATE 50 MG/1
TABLET, FILM COATED ORAL
Qty: 180 TABLET | Refills: 0 | Status: CANCELLED | OUTPATIENT
Start: 2019-03-20

## 2019-03-20 NOTE — TELEPHONE ENCOUNTER
I called patient to check on her condition since she was to have a Stress Test today and it is listed as no show. Patient said she had diarrhea 3 times today so she did not go for her nuclear stress test today.   She left a message for them to call her gulshan

## 2019-03-21 RX ORDER — SPIRONOLACTONE 25 MG/1
TABLET ORAL
Qty: 90 TABLET | Refills: 0 | Status: SHIPPED | OUTPATIENT
Start: 2019-03-21 | End: 2019-10-07

## 2019-03-21 RX ORDER — ACETAMINOPHEN AND CODEINE PHOSPHATE 300; 30 MG/1; MG/1
TABLET ORAL
Qty: 50 TABLET | Refills: 0 | Status: SHIPPED | OUTPATIENT
Start: 2019-03-21 | End: 2019-04-27

## 2019-03-21 RX ORDER — SPIRONOLACTONE 25 MG/1
TABLET ORAL
Qty: 90 TABLET | Refills: 0 | OUTPATIENT
Start: 2019-03-21

## 2019-03-21 NOTE — TELEPHONE ENCOUNTER
Requesting Topamax 50 mg  LOV: 3/19/19  RTC: 4 weeks  Last Relevant Labs: n/a  Filled: 11/5/18 #180 with 0 refills    Future Appointments   Date Time Provider Damon Pittman   3/28/2019 10:00 AM [facility] CARD NUC RM 1 EH CARD [facility] AT [facility]   5/21/2019 10:30 AM Edmundo Kaye DPM [id number] [facility]     Do you want to refill?

## 2019-03-28 ENCOUNTER — HOSPITAL ENCOUNTER (OUTPATIENT)
Dept: CV DIAGNOSTICS | Facility: HOSPITAL | Age: 69
Discharge: HOME | End: 2019-03-28
Attending: INTERNAL MEDICINE
Payer: MEDICARE

## 2019-03-28 DIAGNOSIS — R26.9 GAIT DISTURBANCE: ICD-10-CM

## 2019-03-28 DIAGNOSIS — E11.42 TYPE 2 DIABETES MELLITUS WITH DIABETIC POLYNEUROPATHY, WITHOUT LONG-TERM CURRENT USE OF INSULIN (HCC): ICD-10-CM

## 2019-03-28 DIAGNOSIS — R07.9 CHEST PAIN, UNSPECIFIED TYPE: ICD-10-CM

## 2019-03-28 DIAGNOSIS — R94.31 ABNORMAL EKG: ICD-10-CM

## 2019-03-28 DIAGNOSIS — Z51.81 THERAPEUTIC DRUG MONITORING: ICD-10-CM

## 2019-03-28 DIAGNOSIS — R68.89 DECREASED EXERCISE TOLERANCE: ICD-10-CM

## 2019-03-28 DIAGNOSIS — E66.01 MORBID OBESITY DUE TO EXCESS CALORIES (HCC): ICD-10-CM

## 2019-03-28 PROCEDURE — 78452 HT MUSCLE IMAGE SPECT MULT: CPT | Performed by: INTERNAL MEDICINE

## 2019-03-28 PROCEDURE — 93018 CV STRESS TEST I&R ONLY: CPT | Performed by: INTERNAL MEDICINE

## 2019-03-28 PROCEDURE — 93017 CV STRESS TEST TRACING ONLY: CPT | Performed by: INTERNAL MEDICINE

## 2019-04-22 RX ORDER — OMEPRAZOLE 20 MG/1
CAPSULE, DELAYED RELEASE ORAL
Qty: 90 CAPSULE | Refills: 1 | Status: SHIPPED | OUTPATIENT
Start: 2019-04-22 | End: 2019-10-27

## 2019-04-22 NOTE — TELEPHONE ENCOUNTER
Last OV: 12/17/18 with Ernesto Martinez NP  Last refill date: 1/22/19     #/refills: #90, 0 refills  When pt was asked to return for OV: NO FOLLOW-UP ON FILE  Upcoming appt/reason: no upcoming appt  Last labs 12/5/18

## 2019-04-24 RX ORDER — DICYCLOMINE HYDROCHLORIDE 10 MG/1
CAPSULE ORAL
Qty: 120 CAPSULE | Refills: 0 | Status: SHIPPED | OUTPATIENT
Start: 2019-04-24 | End: 2019-07-23

## 2019-04-24 NOTE — TELEPHONE ENCOUNTER
Last OV: 12/17/18 with Estefany Forbes NP  Last refill date: 12/27/18     #/refills: #120, 0 refills  When pt was asked to return for OV: no follow-up on file  Upcoming appt/reason: no upcoming appt  Last labs December 2018

## 2019-04-29 RX ORDER — ACETAMINOPHEN AND CODEINE PHOSPHATE 300; 30 MG/1; MG/1
TABLET ORAL
Qty: 50 TABLET | Refills: 0 | Status: SHIPPED | OUTPATIENT
Start: 2019-04-29 | End: 2019-06-03

## 2019-04-29 RX ORDER — ZOLPIDEM TARTRATE 5 MG/1
TABLET ORAL
Qty: 30 TABLET | Refills: 0 | Status: SHIPPED | OUTPATIENT
Start: 2019-04-29 | End: 2019-06-03

## 2019-04-29 NOTE — TELEPHONE ENCOUNTER
2 Rx sent to [facility] in [place]    Fax #[phone]    Zolpidem Tartrate 5mg oral tabs  #30 tabs w/ 0 refills    Acetaminophen-Codeine #3 300-30mg oral tab  #50 tabs  No refills

## 2019-04-29 NOTE — TELEPHONE ENCOUNTER
Acetaminophen -Codeine last filled 3/21/19 #50     Zolpidem Tartrate last filled 12/5/18 #30 with 3 refills    LOV 12/17/18 -

## 2019-05-19 DIAGNOSIS — E11.42 TYPE 2 DIABETES MELLITUS WITH DIABETIC POLYNEUROPATHY, WITHOUT LONG-TERM CURRENT USE OF INSULIN (HCC): ICD-10-CM

## 2019-05-20 RX ORDER — TOPIRAMATE 25 MG/1
TABLET ORAL
Qty: 180 TABLET | Refills: 0 | OUTPATIENT
Start: 2019-05-20

## 2019-05-20 NOTE — TELEPHONE ENCOUNTER
Requesting Topiramate  LOV: 3/19/19  RTC: 4 weeks  Last Relevant Labs: n/a  Filled: 4/1/19 #180 with 0 refills    Future Appointments   Date Time Provider Damon Pittman   5/21/2019 10:30 AM Kallie Kaye DPM [id number] [facility]     Denied refi

## 2019-06-01 ENCOUNTER — TELEPHONE (OUTPATIENT)
Dept: INTERNAL MEDICINE CLINIC | Facility: CLINIC | Age: 69
End: 2019-06-01

## 2019-06-03 RX ORDER — ACETAMINOPHEN AND CODEINE PHOSPHATE 300; 30 MG/1; MG/1
TABLET ORAL
Qty: 50 TABLET | Refills: 0 | Status: SHIPPED | OUTPATIENT
Start: 2019-06-03 | End: 2019-07-08

## 2019-06-03 RX ORDER — ZOLPIDEM TARTRATE 5 MG/1
TABLET ORAL
Qty: 30 TABLET | Refills: 0 | Status: SHIPPED | OUTPATIENT
Start: 2019-06-03 | End: 2019-12-09

## 2019-06-10 RX ORDER — PRAVASTATIN SODIUM 20 MG
TABLET ORAL
Qty: 90 TABLET | Refills: 0 | Status: SHIPPED | OUTPATIENT
Start: 2019-06-10 | End: 2019-08-23

## 2019-06-11 ENCOUNTER — TELEPHONE (OUTPATIENT)
Dept: INTERNAL MEDICINE CLINIC | Facility: CLINIC | Age: 69
End: 2019-06-11

## 2019-06-11 NOTE — TELEPHONE ENCOUNTER
PA initiated for Zolpidem Tartrate, signed by doctor and faxed to [facility].      Awaiting response

## 2019-06-12 ENCOUNTER — TELEPHONE (OUTPATIENT)
Dept: INTERNAL MEDICINE CLINIC | Facility: CLINIC | Age: 69
End: 2019-06-12

## 2019-06-12 NOTE — TELEPHONE ENCOUNTER
Tried doing PA on CMM and cannot. Had to call [facility] in [place] sent request. [phone]  EOC ID  [id number]  Pa started.  Awaiting form

## 2019-06-13 RX ORDER — TOPIRAMATE 50 MG/1
TABLET, FILM COATED ORAL
Qty: 180 TABLET | Refills: 0 | OUTPATIENT
Start: 2019-06-13

## 2019-06-13 RX ORDER — TOPIRAMATE 50 MG/1
TABLET, FILM COATED ORAL
Qty: 60 TABLET | Refills: 0 | Status: SHIPPED | OUTPATIENT
Start: 2019-06-13 | End: 2019-07-14

## 2019-06-13 RX ORDER — SPIRONOLACTONE 25 MG/1
TABLET ORAL
Qty: 90 TABLET | Refills: 0 | Status: SHIPPED | OUTPATIENT
Start: 2019-06-13 | End: 2019-08-23

## 2019-06-13 NOTE — TELEPHONE ENCOUNTER
Called to check on form that was to be faxed - had to go through prompts and got a new PA started [address] [place] [address] [phone]. I cannot speak to anyone to answer questions.

## 2019-06-13 NOTE — TELEPHONE ENCOUNTER
Spironolactone  Last OV relevant to medication: 12-5-18  Last refill date: 3-21-19  #/refills: 0  When pt was asked to return for OV: none  Upcoming appt/reason: none  Recent labs: 12-5-18: CMP

## 2019-06-13 NOTE — TELEPHONE ENCOUNTER
Requesting Topiramate  LOV: 3/19/19  RTC: 4 weeks  Last Relevant Labs: n/a  Filled: 4/1/19 #180 with 0 refills    No future appointments. Pt is due for office visit - I changed pended refill to one month and will call her to schedule if okay?

## 2019-06-14 NOTE — TELEPHONE ENCOUNTER
Called again to check on status of form never received - spoke with male who will send now. I have called 4 times and had to go through prompts and it starts another case but no form ever comes. He will send now.

## 2019-06-17 NOTE — TELEPHONE ENCOUNTER
Insurance sent back denial of [place] stating patient must try the formulary alternatives and failed or they would be inappropriate to use: Invokana or Jardiance. Can patient be given either med?     You tried to give Caridad Gómez in 2018 and it was not on

## 2019-06-24 RX ORDER — NORTRIPTYLINE HYDROCHLORIDE 25 MG/1
CAPSULE ORAL
Qty: 90 CAPSULE | Refills: 3 | Status: SHIPPED | OUTPATIENT
Start: 2019-06-24 | End: 2020-05-13

## 2019-06-25 NOTE — TELEPHONE ENCOUNTER
Patient notified of need to change med. She will proceed. Scheduled for first available and patient would like to be placed on wait list.  RX sent to pharmacy.       Future Appointments   Date Time Provider Damon Pittman   8/13/2019 10:00 AM Lesley King

## 2019-07-10 RX ORDER — ACETAMINOPHEN AND CODEINE PHOSPHATE 300; 30 MG/1; MG/1
TABLET ORAL
Qty: 50 TABLET | Refills: 0 | Status: SHIPPED | OUTPATIENT
Start: 2019-07-10 | End: 2019-08-22

## 2019-07-11 DIAGNOSIS — E11.42 TYPE 2 DIABETES MELLITUS WITH DIABETIC POLYNEUROPATHY, WITHOUT LONG-TERM CURRENT USE OF INSULIN (HCC): ICD-10-CM

## 2019-07-12 NOTE — TELEPHONE ENCOUNTER
Requesting   Requested Prescriptions     Pending Prescriptions Disp Refills   • Liraglutide (VICTOZA) 18 MG/3ML Subcutaneous Solution Pen-injector [Pharmacy Med Name: Demarcus Briggs 18MG/3ML INJ PEN 3ML] 27 mL 0     Sig: INJECT 1.8 MG UNDER THE SKIN DAILY.      LO

## 2019-07-18 RX ORDER — TOPIRAMATE 50 MG/1
TABLET, FILM COATED ORAL
Qty: 60 TABLET | Refills: 0 | Status: SHIPPED | OUTPATIENT
Start: 2019-07-18 | End: 2019-08-13

## 2019-07-24 RX ORDER — DICYCLOMINE HYDROCHLORIDE 10 MG/1
CAPSULE ORAL
Qty: 120 CAPSULE | Refills: 0 | Status: SHIPPED | OUTPATIENT
Start: 2019-07-24 | End: 2019-09-22

## 2019-08-13 ENCOUNTER — OFFICE VISIT (OUTPATIENT)
Dept: INTERNAL MEDICINE CLINIC | Facility: CLINIC | Age: 69
End: 2019-08-13
Payer: MEDICARE

## 2019-08-13 VITALS
HEART RATE: 88 BPM | BODY MASS INDEX: 51.41 KG/M2 | HEIGHT: 59 IN | DIASTOLIC BLOOD PRESSURE: 80 MMHG | RESPIRATION RATE: 16 BRPM | WEIGHT: 255 LBS | SYSTOLIC BLOOD PRESSURE: 124 MMHG

## 2019-08-13 DIAGNOSIS — E11.42 TYPE 2 DIABETES MELLITUS WITH DIABETIC POLYNEUROPATHY, WITHOUT LONG-TERM CURRENT USE OF INSULIN (HCC): ICD-10-CM

## 2019-08-13 DIAGNOSIS — M25.562 CHRONIC PAIN OF BOTH KNEES: ICD-10-CM

## 2019-08-13 DIAGNOSIS — G89.29 CHRONIC PAIN OF BOTH KNEES: ICD-10-CM

## 2019-08-13 DIAGNOSIS — Z51.81 THERAPEUTIC DRUG MONITORING: Primary | ICD-10-CM

## 2019-08-13 DIAGNOSIS — M25.561 CHRONIC PAIN OF BOTH KNEES: ICD-10-CM

## 2019-08-13 PROCEDURE — 99214 OFFICE O/P EST MOD 30 MIN: CPT | Performed by: INTERNAL MEDICINE

## 2019-08-13 RX ORDER — SEMAGLUTIDE 1.34 MG/ML
0.5 INJECTION, SOLUTION SUBCUTANEOUS WEEKLY
Qty: 1 PEN | Refills: 1 | Status: SHIPPED | OUTPATIENT
Start: 2019-08-13 | End: 2019-09-26

## 2019-08-13 RX ORDER — TOPIRAMATE 50 MG/1
TABLET, FILM COATED ORAL
Qty: 60 TABLET | Refills: 0 | Status: SHIPPED | OUTPATIENT
Start: 2019-08-13 | End: 2019-11-05

## 2019-08-13 RX ORDER — PREGABALIN 150 MG/1
CAPSULE ORAL
Qty: 180 CAPSULE | Refills: 0 | OUTPATIENT
Start: 2019-08-13 | End: 2019-09-25

## 2019-08-13 NOTE — PROGRESS NOTES
CC: Patient presents with:  Weight Check: up 5 lbs       HPI:     Returns today for weight check  Under immense stress at home   Put her GD in [employer].    Denies any chest pain or shortness of breath   Tolerating medication but not taking victoza as ONETOUCH DELICA LANCETS 74Q Does not apply Misc USE 1 LANCET BY FINGER STICK ROUTE DAILY Disp: 100 each Rfl: 3   aspirin (ASPIR-81) 81 MG Oral Tab EC Take 1 tablet (81 mg total) by mouth daily.  Disp: 1 tablet Rfl: 0      Past Medical History:   Diagnosis HSM  EXTREMITIES: no cyanosis, no clubbing, no edema    No orders of the defined types were placed in this encounter.      Requested Prescriptions     Signed Prescriptions Disp Refills   • topiramate 50 MG Oral Tab 60 tablet 0     Sig: TAKE 1 TABLET BY MOUT

## 2019-08-13 NOTE — TELEPHONE ENCOUNTER
Lyrica 150 MG  Last OV relevant to medication: 12-5-18  Last refill date: 6-20-19 #/refills: 0  When pt was asked to return for OV: none  Upcoming appt/reason: none  Recent labs: none

## 2019-08-14 ENCOUNTER — TELEPHONE (OUTPATIENT)
Dept: INTERNAL MEDICINE CLINIC | Facility: CLINIC | Age: 69
End: 2019-08-14

## 2019-08-14 DIAGNOSIS — E78.00 PURE HYPERCHOLESTEROLEMIA: ICD-10-CM

## 2019-08-14 DIAGNOSIS — I10 ESSENTIAL HYPERTENSION, BENIGN: Primary | ICD-10-CM

## 2019-08-14 DIAGNOSIS — E11.42 TYPE 2 DIABETES MELLITUS WITH DIABETIC POLYNEUROPATHY, WITHOUT LONG-TERM CURRENT USE OF INSULIN (HCC): ICD-10-CM

## 2019-08-14 NOTE — TELEPHONE ENCOUNTER
1 month supply. Pt needs to f/u for medication for further refills or dispense bigger quantity. Please schedule pt for a medication f.u.

## 2019-08-19 NOTE — TELEPHONE ENCOUNTER
Appt scheduled    Future Appointments   Date Time Provider Damon Pittman   9/6/2019 11:00 AM Paramjit Valenzuela MD [facility] [place]     Can labs be drawn prior to appt if fasting labs ?  Call pt to advise

## 2019-08-22 ENCOUNTER — TELEPHONE (OUTPATIENT)
Dept: INTERNAL MEDICINE CLINIC | Facility: CLINIC | Age: 69
End: 2019-08-22

## 2019-08-22 RX ORDER — ACETAMINOPHEN AND CODEINE PHOSPHATE 300; 30 MG/1; MG/1
TABLET ORAL
Qty: 50 TABLET | Refills: 0 | Status: SHIPPED | OUTPATIENT
Start: 2019-08-22 | End: 2019-09-06

## 2019-08-23 DIAGNOSIS — E11.42 TYPE 2 DIABETES MELLITUS WITH DIABETIC POLYNEUROPATHY, WITHOUT LONG-TERM CURRENT USE OF INSULIN (HCC): ICD-10-CM

## 2019-08-23 RX ORDER — LANCETS 33 GAUGE
EACH MISCELLANEOUS
Qty: 100 EACH | Refills: 0 | Status: SHIPPED | OUTPATIENT
Start: 2019-08-23 | End: 2021-10-06

## 2019-08-23 NOTE — TELEPHONE ENCOUNTER
Only dispensed enough to get patient to next OV      Metformin HCL 1000 MG Oral Tab    Passed  Protocol    Last OV relevant to medication: 12/17/2019  Last refill date: 8/14/2019     #/refills: #18 w/ 0 refills  When pt was asked to return for OV: none noted  Upcoming appt/reason: 9/6/2019        Spironolactone 25 MG Oral Tab    Passed Protocol    Last OV relevant to medication: 12/17/2019  Last refill date: 6/13/2019     #/refills: #71 w/ 0 refills   When pt was asked to return for OV: none noted  Upcoming appt/reason: 9/6/2019          Pravastatin Sodium 20 MG Oral tab    Passed Protocol    Last OV relevant to medication: 12/17/2019  Last refill date: 6/10/2019     #/refills: #90 w/ 0 refills  When pt was asked to return for OV: none noted  Upcoming appt/reason: 9/6/2019

## 2019-08-24 DIAGNOSIS — E11.42 TYPE 2 DIABETES MELLITUS WITH DIABETIC POLYNEUROPATHY, WITHOUT LONG-TERM CURRENT USE OF INSULIN (HCC): ICD-10-CM

## 2019-08-24 RX ORDER — PRAVASTATIN SODIUM 20 MG
TABLET ORAL
Qty: 14 TABLET | Refills: 0 | Status: SHIPPED | OUTPATIENT
Start: 2019-08-24 | End: 2019-10-24

## 2019-08-24 RX ORDER — SPIRONOLACTONE 25 MG/1
TABLET ORAL
Qty: 14 TABLET | Refills: 0 | Status: SHIPPED | OUTPATIENT
Start: 2019-08-24 | End: 2019-09-06

## 2019-08-26 RX ORDER — SPIRONOLACTONE 25 MG/1
TABLET ORAL
Qty: 90 TABLET | Refills: 0 | OUTPATIENT
Start: 2019-08-26

## 2019-08-26 RX ORDER — PRAVASTATIN SODIUM 20 MG
TABLET ORAL
Qty: 90 TABLET | Refills: 0 | OUTPATIENT
Start: 2019-08-26

## 2019-08-26 RX ORDER — TOPIRAMATE 50 MG/1
TABLET, FILM COATED ORAL
Qty: 60 TABLET | Refills: 0 | OUTPATIENT
Start: 2019-08-26

## 2019-08-26 NOTE — TELEPHONE ENCOUNTER
Requesting Topiramate  LOV: 8/13/19  RTC: 6 weeks  Last Relevant Labs: na  Filled: 8/13/19 #60 with 0 refills    Future Appointments   Date Time Provider [facility]   9/6/2019 11:00 AM Kim Lynn MD [facility] [place]   9/26/2019 10:00 AM Susy Chen,

## 2019-08-27 ENCOUNTER — TELEPHONE (OUTPATIENT)
Dept: INTERNAL MEDICINE CLINIC | Facility: CLINIC | Age: 69
End: 2019-08-27

## 2019-08-27 NOTE — TELEPHONE ENCOUNTER
Prior authorization completed and needs signature from doctor before fax. Prior authorization form in Dr. Joaquin Giraldo in-box.

## 2019-09-06 ENCOUNTER — APPOINTMENT (OUTPATIENT)
Dept: LAB | Age: 69
End: 2019-09-06
Attending: FAMILY MEDICINE
Payer: MEDICARE

## 2019-09-06 ENCOUNTER — OFFICE VISIT (OUTPATIENT)
Dept: INTERNAL MEDICINE CLINIC | Facility: CLINIC | Age: 69
End: 2019-09-06
Payer: MEDICARE

## 2019-09-06 VITALS
WEIGHT: 253 LBS | HEIGHT: 59 IN | DIASTOLIC BLOOD PRESSURE: 72 MMHG | RESPIRATION RATE: 16 BRPM | HEART RATE: 84 BPM | SYSTOLIC BLOOD PRESSURE: 134 MMHG | OXYGEN SATURATION: 98 % | TEMPERATURE: 98 F | BODY MASS INDEX: 51 KG/M2

## 2019-09-06 DIAGNOSIS — G89.29 CHRONIC PAIN OF RIGHT KNEE: ICD-10-CM

## 2019-09-06 DIAGNOSIS — I10 ESSENTIAL HYPERTENSION, BENIGN: ICD-10-CM

## 2019-09-06 DIAGNOSIS — E11.42 TYPE 2 DIABETES MELLITUS WITH DIABETIC POLYNEUROPATHY, WITHOUT LONG-TERM CURRENT USE OF INSULIN (HCC): Primary | ICD-10-CM

## 2019-09-06 DIAGNOSIS — E66.01 MORBID OBESITY DUE TO EXCESS CALORIES (HCC): ICD-10-CM

## 2019-09-06 DIAGNOSIS — E11.42 TYPE 2 DIABETES MELLITUS WITH DIABETIC POLYNEUROPATHY, WITHOUT LONG-TERM CURRENT USE OF INSULIN (HCC): ICD-10-CM

## 2019-09-06 DIAGNOSIS — E78.00 PURE HYPERCHOLESTEROLEMIA: ICD-10-CM

## 2019-09-06 DIAGNOSIS — R35.0 URINARY FREQUENCY: ICD-10-CM

## 2019-09-06 DIAGNOSIS — M25.561 CHRONIC PAIN OF RIGHT KNEE: ICD-10-CM

## 2019-09-06 LAB
ALBUMIN SERPL-MCNC: 4 G/DL (ref 3.4–5)
ALBUMIN/GLOB SERPL: 1 {RATIO} (ref 1–2)
ALP LIVER SERPL-CCNC: 152 U/L (ref 55–142)
ALT SERPL-CCNC: 19 U/L (ref 13–56)
ANION GAP SERPL CALC-SCNC: 8 MMOL/L (ref 0–18)
AST SERPL-CCNC: 18 U/L (ref 15–37)
BILIRUB SERPL-MCNC: 0.3 MG/DL (ref 0.1–2)
BILIRUB UR QL STRIP.AUTO: NEGATIVE
BUN BLD-MCNC: 13 MG/DL (ref 7–18)
BUN/CREAT SERPL: 16.5 (ref 10–20)
CALCIUM BLD-MCNC: 9.4 MG/DL (ref 8.5–10.1)
CHLORIDE SERPL-SCNC: 104 MMOL/L (ref 98–112)
CHOLEST SMN-MCNC: 159 MG/DL (ref ?–200)
CLARITY UR REFRACT.AUTO: CLEAR
CO2 SERPL-SCNC: 28 MMOL/L (ref 21–32)
COLOR UR AUTO: YELLOW
CREAT BLD-MCNC: 0.79 MG/DL (ref 0.55–1.02)
CREAT UR-SCNC: 181 MG/DL
EST. AVERAGE GLUCOSE BLD GHB EST-MCNC: 171 MG/DL (ref 68–126)
GLOBULIN PLAS-MCNC: 4.1 G/DL (ref 2.8–4.4)
GLUCOSE BLD-MCNC: 106 MG/DL (ref 70–99)
GLUCOSE UR STRIP.AUTO-MCNC: >=500 MG/DL
HBA1C MFR BLD HPLC: 7.6 % (ref ?–5.7)
HDLC SERPL-MCNC: 82 MG/DL (ref 40–59)
KETONES UR STRIP.AUTO-MCNC: NEGATIVE MG/DL
LDLC SERPL CALC-MCNC: 63 MG/DL (ref ?–100)
LEUKOCYTE ESTERASE UR QL STRIP.AUTO: NEGATIVE
M PROTEIN MFR SERPL ELPH: 8.1 G/DL (ref 6.4–8.2)
MICROALBUMIN UR-MCNC: 1.16 MG/DL
MICROALBUMIN/CREAT 24H UR-RTO: 6.4 UG/MG (ref ?–30)
NITRITE UR QL STRIP.AUTO: NEGATIVE
NONHDLC SERPL-MCNC: 77 MG/DL (ref ?–130)
OSMOLALITY SERPL CALC.SUM OF ELEC: 291 MOSM/KG (ref 275–295)
PH UR STRIP.AUTO: 5 [PH] (ref 4.5–8)
POTASSIUM SERPL-SCNC: 4.1 MMOL/L (ref 3.5–5.1)
PROT UR STRIP.AUTO-MCNC: NEGATIVE MG/DL
RBC UR QL AUTO: NEGATIVE
SODIUM SERPL-SCNC: 140 MMOL/L (ref 136–145)
SP GR UR STRIP.AUTO: 1.04 (ref 1–1.03)
TRIGL SERPL-MCNC: 69 MG/DL (ref 30–149)
UROBILINOGEN UR STRIP.AUTO-MCNC: <2 MG/DL
VLDLC SERPL CALC-MCNC: 14 MG/DL (ref 0–30)

## 2019-09-06 PROCEDURE — 36415 COLL VENOUS BLD VENIPUNCTURE: CPT

## 2019-09-06 PROCEDURE — 80053 COMPREHEN METABOLIC PANEL: CPT

## 2019-09-06 PROCEDURE — 99214 OFFICE O/P EST MOD 30 MIN: CPT | Performed by: FAMILY MEDICINE

## 2019-09-06 PROCEDURE — 83036 HEMOGLOBIN GLYCOSYLATED A1C: CPT

## 2019-09-06 PROCEDURE — 82043 UR ALBUMIN QUANTITATIVE: CPT

## 2019-09-06 PROCEDURE — 82570 ASSAY OF URINE CREATININE: CPT

## 2019-09-06 PROCEDURE — 81003 URINALYSIS AUTO W/O SCOPE: CPT

## 2019-09-06 PROCEDURE — 80061 LIPID PANEL: CPT

## 2019-09-06 RX ORDER — ACETAMINOPHEN AND CODEINE PHOSPHATE 300; 30 MG/1; MG/1
TABLET ORAL
Qty: 50 TABLET | Refills: 0 | Status: SHIPPED | OUTPATIENT
Start: 2019-09-20 | End: 2019-10-27

## 2019-09-06 NOTE — PROGRESS NOTES
HPI:    Patient ID: Alejandro Eldridge is a 77year old female. HPI  Alejandro Eldridge is a 77year old female. HPI:   Pt is here for med ck/follow up. Overall is doing well. Is taking meds as directed.   No issues w medications      Did have her labs d Inject 1 pen into the skin daily. Disp: 100 each Rfl: 0   SPIRONOLACTONE 25 MG Oral Tab TAKE 1 TABLET BY MOUTH DAILY Disp: 90 tablet Rfl: 0   nystatin [id number] UNIT/GM External Cream Apply 1 Application topically 2 (two) times daily.  Disp: 60 g Rfl: 0   aspi no apparent distress  SKIN: no rashes  NECK: supple,no adenopathy  LUNGS: clear to auscultation  CARDIO: RRR without murmur  EXTREMITIES: no edema    ASSESSMENT AND PLAN:   (E11.42) Type 2 diabetes mellitus with diabetic polyneuropathy, without long-term c Zolpidem Tartrate 5 MG Oral Tab 1 PO QHS prn Disp: 30 tablet Rfl: 0   OMEPRAZOLE 20 MG Oral Capsule Delayed Release TAKE 1 CAPSULE(20 MG) BY MOUTH DAILY Disp: 90 capsule Rfl: 1   Insulin Pen Needle (BD PEN NEEDLE ANDRAE U/F) 32G X 4 MM Does not apply Misc

## 2019-09-18 ENCOUNTER — TELEPHONE (OUTPATIENT)
Dept: INTERNAL MEDICINE CLINIC | Facility: CLINIC | Age: 69
End: 2019-09-18

## 2019-09-23 RX ORDER — SPIRONOLACTONE 25 MG/1
TABLET ORAL
Qty: 14 TABLET | Refills: 0 | Status: SHIPPED | OUTPATIENT
Start: 2019-09-23 | End: 2019-09-26

## 2019-09-23 RX ORDER — DICYCLOMINE HYDROCHLORIDE 10 MG/1
CAPSULE ORAL
Qty: 120 CAPSULE | Refills: 0 | Status: SHIPPED | OUTPATIENT
Start: 2019-09-23 | End: 2019-12-01

## 2019-09-23 NOTE — TELEPHONE ENCOUNTER
Spironolactone 25 Mg Oral tab    Passed Protocol    Last OV relevant to medication: 9/6/2019  Last refill date: 3/21/2019     #/refills: #90 w/ 0 refills   When pt was asked to return for OV: 6 months   Upcoming appt/reason: No future appointments

## 2019-09-25 RX ORDER — PREGABALIN 150 MG/1
CAPSULE ORAL
Qty: 180 CAPSULE | Refills: 0 | Status: SHIPPED | OUTPATIENT
Start: 2019-09-25 | End: 2019-12-28

## 2019-09-26 ENCOUNTER — OFFICE VISIT (OUTPATIENT)
Dept: INTERNAL MEDICINE CLINIC | Facility: CLINIC | Age: 69
End: 2019-09-26
Payer: MEDICARE

## 2019-09-26 VITALS
BODY MASS INDEX: 50.6 KG/M2 | DIASTOLIC BLOOD PRESSURE: 76 MMHG | SYSTOLIC BLOOD PRESSURE: 122 MMHG | HEIGHT: 59 IN | RESPIRATION RATE: 16 BRPM | HEART RATE: 76 BPM | WEIGHT: 251 LBS

## 2019-09-26 DIAGNOSIS — E78.00 PURE HYPERCHOLESTEROLEMIA: ICD-10-CM

## 2019-09-26 DIAGNOSIS — I10 ESSENTIAL HYPERTENSION, BENIGN: ICD-10-CM

## 2019-09-26 DIAGNOSIS — E11.42 TYPE 2 DIABETES MELLITUS WITH DIABETIC POLYNEUROPATHY, WITHOUT LONG-TERM CURRENT USE OF INSULIN (HCC): ICD-10-CM

## 2019-09-26 DIAGNOSIS — Z51.81 THERAPEUTIC DRUG MONITORING: Primary | ICD-10-CM

## 2019-09-26 PROCEDURE — 99214 OFFICE O/P EST MOD 30 MIN: CPT | Performed by: INTERNAL MEDICINE

## 2019-09-26 RX ORDER — SEMAGLUTIDE 1.34 MG/ML
1 INJECTION, SOLUTION SUBCUTANEOUS WEEKLY
Qty: 2 PEN | Refills: 1 | Status: SHIPPED | OUTPATIENT
Start: 2019-09-26 | End: 2019-12-01

## 2019-09-26 NOTE — PROGRESS NOTES
CC: Patient presents with:  Weight Check: down 4 lb       HPI:     Down 4 lb from previous   Thought originally due to knee replacement   Was diagnosed with arthritis.    Water aerobics lost 4 lb   Moved 2 days to 4 days in the class  Doing a workout of Apply 1 Application topically 2 (two) times daily. Disp: 60 g Rfl: 0   aspirin (ASPIR-81) 81 MG Oral Tab EC Take 1 tablet (81 mg total) by mouth daily.  Disp: 1 tablet Rfl: 0      Past Medical History:   Diagnosis Date   • Anxiety    • Colitis    • Anjel Lira edema    No orders of the defined types were placed in this encounter. Requested Prescriptions     Signed Prescriptions Disp Refills   • Empagliflozin (JARDIANCE) 10 MG Oral Tab 30 tablet 1     Sig: Take 10 mg by mouth every morning.    • OZEMPIC, 1 MG/

## 2019-10-07 RX ORDER — SPIRONOLACTONE 25 MG/1
TABLET ORAL
Qty: 30 TABLET | Refills: 0 | Status: SHIPPED | OUTPATIENT
Start: 2019-10-07 | End: 2019-10-23

## 2019-10-23 RX ORDER — SPIRONOLACTONE 25 MG/1
TABLET ORAL
Qty: 30 TABLET | Refills: 0 | Status: SHIPPED | OUTPATIENT
Start: 2019-10-23 | End: 2019-12-09

## 2019-10-23 NOTE — TELEPHONE ENCOUNTER
Spironolactone 25 mg Oral Tab    Passed Protocol    Last OV relevant to medication: 9/6/2019  Last refill date: 10/7/2019     #/refills: #30 w 0 refills   When pt was asked to return for OV: 6 months   Upcoming appt/reason: No future appointments   Lab Res

## 2019-10-24 NOTE — TELEPHONE ENCOUNTER
Last OV: 9/6/19 with Dr. Opal Breen  Last refill date: 8/24/19     #/refills: #14, 0 refills  When pt was asked to return for OV: 6 months  Upcoming appt/reason: no upcoming appt  Last labs 9/6/19

## 2019-10-25 RX ORDER — PRAVASTATIN SODIUM 20 MG
TABLET ORAL
Qty: 90 TABLET | Refills: 1 | Status: SHIPPED | OUTPATIENT
Start: 2019-10-25 | End: 2020-02-20

## 2019-10-28 RX ORDER — OMEPRAZOLE 20 MG/1
CAPSULE, DELAYED RELEASE ORAL
Qty: 90 CAPSULE | Refills: 0 | Status: SHIPPED | OUTPATIENT
Start: 2019-10-28 | End: 2020-01-21

## 2019-10-28 RX ORDER — ACETAMINOPHEN AND CODEINE PHOSPHATE 300; 30 MG/1; MG/1
TABLET ORAL
Qty: 50 TABLET | Refills: 0 | Status: SHIPPED | OUTPATIENT
Start: 2019-10-28 | End: 2019-11-29

## 2019-10-28 NOTE — TELEPHONE ENCOUNTER
Omeprazole 20mg last filled 4/22/19 #90 with 1 refill    Acetaminophen-Codeine #3 last filled 9/20/19 #50    LOV  9/6/19    RTC - 6 months

## 2019-11-06 NOTE — TELEPHONE ENCOUNTER
Requesting   Requested Prescriptions     Pending Prescriptions Disp Refills   • TOPIRAMATE 50 MG Oral Tab [Pharmacy Med Name: TOPIRAMATE 50MG TABLETS] 60 tablet 0     Sig: TAKE 1 TABLET BY MOUTH TWICE DAILY     LOV: 9/26/19  RTC: no follow up an file  Fill

## 2019-11-07 RX ORDER — TOPIRAMATE 50 MG/1
TABLET, FILM COATED ORAL
Qty: 60 TABLET | Refills: 0 | Status: SHIPPED | OUTPATIENT
Start: 2019-11-07 | End: 2019-12-09

## 2019-11-21 DIAGNOSIS — E11.42 TYPE 2 DIABETES MELLITUS WITH DIABETIC POLYNEUROPATHY, WITHOUT LONG-TERM CURRENT USE OF INSULIN (HCC): ICD-10-CM

## 2019-11-22 RX ORDER — TOPIRAMATE 50 MG/1
TABLET, FILM COATED ORAL
Qty: 180 TABLET | Refills: 0 | OUTPATIENT
Start: 2019-11-22

## 2019-11-22 NOTE — TELEPHONE ENCOUNTER
Requesting Victoza and topiramate  LOV: 9/26/19  RTC: not noted  Last Relevant Labs: 9/6/19  Filled: 11/7/19 #60 with 0 refills  topiramate  Filled: is no longer on Victoza - on Ozempic    No future appointments. Denied both refills.   Victoza is no long

## 2019-11-26 RX ORDER — SPIRONOLACTONE 25 MG/1
TABLET ORAL
Qty: 90 TABLET | Refills: 0 | Status: SHIPPED | OUTPATIENT
Start: 2019-11-26 | End: 2019-12-14

## 2019-11-26 RX ORDER — OMEPRAZOLE 20 MG/1
CAPSULE, DELAYED RELEASE ORAL
Qty: 90 CAPSULE | Refills: 0 | OUTPATIENT
Start: 2019-11-26

## 2019-11-26 NOTE — TELEPHONE ENCOUNTER
Passed protocol - Spirinolactone     Failed protocol - Omeprazole    Last refill:    10/23/2019 Spironolactone 25 mg #30 NR  10/28/2019 Omeprazole 20 mg #90 NR - Sean in [place]     LOV:   9/6/2019 Dr Katerin Shukla RTC 6 months  No FOV scheduled

## 2019-11-30 NOTE — TELEPHONE ENCOUNTER
Acetaminophen-Codeine #3 300-30 MG  Last OV relevant to medication: 9-6-19  Last refill date: 10-28-19 #/refills: 0  When pt was asked to return for OV: 6 mo.   Upcoming appt/reason: none  Recent labs: none

## 2019-12-01 RX ORDER — ACETAMINOPHEN AND CODEINE PHOSPHATE 300; 30 MG/1; MG/1
TABLET ORAL
Qty: 50 TABLET | Refills: 0 | Status: SHIPPED | OUTPATIENT
Start: 2019-12-01 | End: 2020-01-15

## 2019-12-03 RX ORDER — DICYCLOMINE HYDROCHLORIDE 10 MG/1
CAPSULE ORAL
Qty: 120 CAPSULE | Refills: 0 | Status: SHIPPED | OUTPATIENT
Start: 2019-12-03 | End: 2020-01-31

## 2019-12-03 NOTE — TELEPHONE ENCOUNTER
DICYCLOMINE HCL 10 MG Oral Cap    Passed Protocol    Last OV relevant to medication: 9/6/2019  Last refill date: 9/23/2019     #/refills: #120 w/ 0 refills   When pt was asked to return for OV: 6 months   Upcoming appt/reason: no future appointments

## 2019-12-04 RX ORDER — SEMAGLUTIDE 1.34 MG/ML
INJECTION, SOLUTION SUBCUTANEOUS
Qty: 3 ML | Refills: 0 | Status: SHIPPED | OUTPATIENT
Start: 2019-12-04

## 2019-12-04 NOTE — TELEPHONE ENCOUNTER
Requesting Ozempic  LOV: 9/26/19  RTC: not noted  Last Relevant Labs: 9/6/19  Filled: 9/26/19 #2 pens with 1 refills    No future appointments. Patient cancelled her appointment 11/21/19 for personal reasons. I called patient and she did schedule.   Sh

## 2019-12-09 ENCOUNTER — OFFICE VISIT (OUTPATIENT)
Dept: INTERNAL MEDICINE CLINIC | Facility: CLINIC | Age: 69
End: 2019-12-09
Payer: MEDICARE

## 2019-12-09 VITALS
HEIGHT: 59 IN | SYSTOLIC BLOOD PRESSURE: 130 MMHG | OXYGEN SATURATION: 97 % | WEIGHT: 242 LBS | BODY MASS INDEX: 48.79 KG/M2 | HEART RATE: 107 BPM | DIASTOLIC BLOOD PRESSURE: 76 MMHG

## 2019-12-09 DIAGNOSIS — E11.42 TYPE 2 DIABETES MELLITUS WITH DIABETIC POLYNEUROPATHY, WITHOUT LONG-TERM CURRENT USE OF INSULIN (HCC): ICD-10-CM

## 2019-12-09 DIAGNOSIS — Z51.81 THERAPEUTIC DRUG MONITORING: Primary | ICD-10-CM

## 2019-12-09 DIAGNOSIS — I10 ESSENTIAL HYPERTENSION, BENIGN: ICD-10-CM

## 2019-12-09 PROCEDURE — 99214 OFFICE O/P EST MOD 30 MIN: CPT | Performed by: INTERNAL MEDICINE

## 2019-12-09 RX ORDER — TOPIRAMATE 50 MG/1
50 TABLET, FILM COATED ORAL 2 TIMES DAILY
Qty: 60 TABLET | Refills: 1 | Status: SHIPPED | OUTPATIENT
Start: 2019-12-09 | End: 2020-01-15

## 2019-12-09 RX ORDER — SEMAGLUTIDE 1.34 MG/ML
1 INJECTION, SOLUTION SUBCUTANEOUS WEEKLY
Qty: 2 PEN | Refills: 1 | Status: SHIPPED | OUTPATIENT
Start: 2019-12-09 | End: 2020-04-21

## 2019-12-09 NOTE — PROGRESS NOTES
CC: Patient presents with:  Weight Check: down 9 lbs       HPI:     Down 9 lb   Doing water aerobics 2 days a week   Happy with how she did with thanksgiving  Doing silver sneakers the other two days   Feels she can balance her self for better   Feels MM Does not apply Misc Inject 1 pen into the skin daily. 100 each 0   • nystatin 300804 UNIT/GM External Cream Apply 1 Application topically 2 (two) times daily. 60 g 0   • aspirin (ASPIR-81) 81 MG Oral Tab EC Take 1 tablet (81 mg total) by mouth daily.  1 supple,no adenopathy  LUNGS: clear to auscultation bilaterally   CARDIO: RRR without murmur  GI: good BS's,NT/ND, no masses or HSM  EXTREMITIES: no cyanosis, no clubbing, no edema    No orders of the defined types were placed in this encounter.      Request

## 2019-12-13 NOTE — TELEPHONE ENCOUNTER
Pt would like this medication expadedited for today states that the pharmacy told her they sent in request a few days ago but they only sent it today

## 2019-12-14 RX ORDER — SPIRONOLACTONE 25 MG/1
25 TABLET ORAL DAILY
Qty: 30 TABLET | Refills: 0 | Status: SHIPPED | OUTPATIENT
Start: 2019-12-14 | End: 2020-01-15

## 2019-12-14 NOTE — TELEPHONE ENCOUNTER
Spironolactone 25 MG  Last OV relevant to medication: 9-6-19  Last refill date: 11-26-19  #/refills: 0   When pt was asked to return for OV: 6 mo.   Upcoming appt/reason: none  Recent labs: 9-6-19: CMP

## 2019-12-28 RX ORDER — PREGABALIN 150 MG/1
CAPSULE ORAL
Qty: 180 CAPSULE | Refills: 0 | Status: SHIPPED | OUTPATIENT
Start: 2019-12-28 | End: 2020-03-30

## 2019-12-28 NOTE — TELEPHONE ENCOUNTER
Pregabalin 150 MG  Last OV relevant to medication: 9-6-19  Last refill date: 9-25-19 #/refills: 0  When pt was asked to return for OV: 6 mo.   Upcoming appt/reason: none  Recent labs: none

## 2020-01-06 RX ORDER — SEMAGLUTIDE 1.34 MG/ML
INJECTION, SOLUTION SUBCUTANEOUS
Qty: 3 ML | Refills: 0 | OUTPATIENT
Start: 2020-01-06

## 2020-01-06 NOTE — TELEPHONE ENCOUNTER
Requesting Ozempic  LOV: 12/9/19  RTC: 2 months  Last Relevant Labs: 9/6/19  Filled: 12/9/19 #2 with 1 refills    Future Appointments   Date Time Provider Damon Pittman   2/5/2020 12:20 PM Billy Jackson MD [facility]

## 2020-01-14 NOTE — TELEPHONE ENCOUNTER
SPIRONOLACTONE 25 MG Oral Tab    Passed Protocol    Last OV relevant to medication: 9/6/2019  Last refill date: 12/14/2019     #/refills: #30 w/ 0 refills   When pt was asked to return for OV: 6 months   Upcoming appt/reason: no future appointments   Lab R

## 2020-01-15 RX ORDER — TOPIRAMATE 50 MG/1
TABLET, FILM COATED ORAL
Qty: 180 TABLET | Refills: 0 | Status: SHIPPED | OUTPATIENT
Start: 2020-01-15 | End: 2020-05-11

## 2020-01-15 RX ORDER — SPIRONOLACTONE 25 MG/1
TABLET ORAL
Qty: 30 TABLET | Refills: 0 | Status: SHIPPED | OUTPATIENT
Start: 2020-01-15 | End: 2020-02-13

## 2020-01-15 RX ORDER — ACETAMINOPHEN AND CODEINE PHOSPHATE 300; 30 MG/1; MG/1
TABLET ORAL
Qty: 50 TABLET | Refills: 0 | Status: SHIPPED | OUTPATIENT
Start: 2020-01-15 | End: 2020-02-15

## 2020-01-15 NOTE — TELEPHONE ENCOUNTER
Requesting topamax  LOV: 12/9/19/  RTC: 8 weeks  Filled: 12/9/19 #30 with 1 refills    Future Appointments   Date Time Provider Damon Pittman   2/5/2020 12:20 PM Raymon Estrada MD [facility]         **Patient requesting 90 day supply **

## 2020-01-21 NOTE — TELEPHONE ENCOUNTER
Omeprazole 20 Mg  Last OV relevant to medication: 6-16-17  Last refill date: 10-28-19 #/refills: 0  When pt was asked to return for OV: 6 mo.   Upcoming appt/reason: none  Recent labs: 9-6-19: CMP

## 2020-01-21 NOTE — TELEPHONE ENCOUNTER
Patient calling in requesting a refill for OMEPRAZOLE 20 MG Oral Capsule Delayed Release    Requesting a 90-day supply    Pharmacy:  [facility] [address] AT [facility] [place] [address] [place] [address], [phone], [phone]

## 2020-01-22 RX ORDER — OMEPRAZOLE 20 MG/1
CAPSULE, DELAYED RELEASE ORAL
Qty: 90 CAPSULE | Refills: 0 | Status: SHIPPED | OUTPATIENT
Start: 2020-01-22 | End: 2020-04-30

## 2020-01-31 RX ORDER — DICYCLOMINE HYDROCHLORIDE 10 MG/1
CAPSULE ORAL
Qty: 120 CAPSULE | Refills: 0 | Status: SHIPPED | OUTPATIENT
Start: 2020-01-31 | End: 2020-03-30

## 2020-01-31 NOTE — TELEPHONE ENCOUNTER
Dicyclomine HCL 10 mg  Last OV relevant to medication: 9-6-19  Last refill date: 12-3-19 #/refills: 0  When pt was asked to return for OV: 6 mo.   Upcoming appt/reason: none  Recent labs: none

## 2020-02-13 ENCOUNTER — IMMUNIZATION (OUTPATIENT)
Dept: INTERNAL MEDICINE CLINIC | Facility: CLINIC | Age: 70
End: 2020-02-13
Payer: MEDICARE

## 2020-02-13 DIAGNOSIS — Z23 NEED FOR VACCINATION: ICD-10-CM

## 2020-02-13 PROCEDURE — G0008 ADMIN INFLUENZA VIRUS VAC: HCPCS | Performed by: FAMILY MEDICINE

## 2020-02-13 PROCEDURE — 90662 IIV NO PRSV INCREASED AG IM: CPT | Performed by: FAMILY MEDICINE

## 2020-02-13 RX ORDER — SPIRONOLACTONE 25 MG/1
TABLET ORAL
Qty: 30 TABLET | Refills: 0 | Status: SHIPPED | OUTPATIENT
Start: 2020-02-13 | End: 2020-03-16

## 2020-02-15 RX ORDER — ACETAMINOPHEN AND CODEINE PHOSPHATE 300; 30 MG/1; MG/1
TABLET ORAL
Qty: 50 TABLET | Refills: 0 | Status: SHIPPED | OUTPATIENT
Start: 2020-02-15 | End: 2020-03-16

## 2020-02-19 DIAGNOSIS — E11.42 TYPE 2 DIABETES MELLITUS WITH DIABETIC POLYNEUROPATHY, WITHOUT LONG-TERM CURRENT USE OF INSULIN (HCC): ICD-10-CM

## 2020-02-20 RX ORDER — OMEPRAZOLE 20 MG/1
CAPSULE, DELAYED RELEASE ORAL
Qty: 90 CAPSULE | Refills: 0 | OUTPATIENT
Start: 2020-02-20

## 2020-02-20 RX ORDER — PRAVASTATIN SODIUM 20 MG
TABLET ORAL
Qty: 90 TABLET | Refills: 0 | Status: SHIPPED | OUTPATIENT
Start: 2020-02-20 | End: 2020-05-13

## 2020-03-05 RX ORDER — EMPAGLIFLOZIN 10 MG/1
TABLET, FILM COATED ORAL
Qty: 30 TABLET | Refills: 1 | Status: SHIPPED | OUTPATIENT
Start: 2020-03-05 | End: 2020-05-27

## 2020-03-05 NOTE — TELEPHONE ENCOUNTER
Requesting   Requested Prescriptions     Pending Prescriptions Disp Refills   • JARDIANCE 10 MG Oral Tab [Pharmacy Med Name: Amy Diana 10MG TABLETS] 30 tablet 1     Sig: TAKE 1 TABLET BY MOUTH EVERY MORNING     LOV: 12/09/19  RTC:8 weeks  Filled: 12/9/19 #

## 2020-03-16 RX ORDER — ACETAMINOPHEN AND CODEINE PHOSPHATE 300; 30 MG/1; MG/1
TABLET ORAL
Qty: 50 TABLET | Refills: 0 | Status: SHIPPED | OUTPATIENT
Start: 2020-03-16 | End: 2020-04-17

## 2020-03-16 RX ORDER — SPIRONOLACTONE 25 MG/1
TABLET ORAL
Qty: 30 TABLET | Refills: 0 | Status: SHIPPED | OUTPATIENT
Start: 2020-03-16 | End: 2020-04-14

## 2020-03-16 NOTE — TELEPHONE ENCOUNTER
Spironolactone last filled 2/13/2020 #30    Acetaminophen-Codeine #3 last filled 2/15/20 #50    LOV 9/6/19    Upcoming appt - none

## 2020-03-17 RX ORDER — SPIRONOLACTONE 25 MG/1
TABLET ORAL
Qty: 90 TABLET | Refills: 0 | OUTPATIENT
Start: 2020-03-17

## 2020-03-30 RX ORDER — PREGABALIN 150 MG/1
CAPSULE ORAL
Qty: 180 CAPSULE | Refills: 0 | Status: SHIPPED | OUTPATIENT
Start: 2020-03-30 | End: 2020-07-03

## 2020-03-30 RX ORDER — DICYCLOMINE HYDROCHLORIDE 10 MG/1
CAPSULE ORAL
Qty: 120 CAPSULE | Refills: 0 | Status: SHIPPED | OUTPATIENT
Start: 2020-03-30 | End: 2020-06-03

## 2020-03-30 NOTE — TELEPHONE ENCOUNTER
Dicyclomine 10 mg failed protocol due to  Gastrointestinal Medication Protocol Failed3/29 12:11 PM   Appointment in past 6 or next 1 month   Last OV relevant to medication: 12-5-18  Last refill date: 1-31-20 #/refills: 0  When pt was asked to return for OV

## 2020-04-13 NOTE — TELEPHONE ENCOUNTER
Requesting ozempic 1 mg  LOV: 12/9/19  RTC: 8 weeks  Last Relevant Labs: 9/6/19  Filled: 12/9/19 #2 pens with 1 refills    No future appointments. Pt cancelled 2/5/20 appt. Will you schedule her for tele visit?

## 2020-04-14 RX ORDER — SPIRONOLACTONE 25 MG/1
TABLET ORAL
Qty: 30 TABLET | Refills: 0 | Status: SHIPPED | OUTPATIENT
Start: 2020-04-14 | End: 2020-05-13

## 2020-04-14 NOTE — TELEPHONE ENCOUNTER
Spironolactone 25 mg failed protocol due to  Hypertension Medications Protocol Failed4/14 11:03 AM   Appointment in past 6 or next 3 months   Last OV relevant to medication: 9-6-19  Last refill date: 3-16-20 #/refills: 0  When pt was asked to return for OV

## 2020-04-17 RX ORDER — ACETAMINOPHEN AND CODEINE PHOSPHATE 300; 30 MG/1; MG/1
TABLET ORAL
Qty: 50 TABLET | Refills: 0 | Status: SHIPPED | OUTPATIENT
Start: 2020-04-17 | End: 2020-06-03

## 2020-04-17 NOTE — TELEPHONE ENCOUNTER
Acetaminophen- Codeine #3  Last OV relevant to medication: 9-6-19  Last refill date: 3-16-20 #/refills: 0  When pt was asked to return for OV: 6 mo.   Upcoming appt/reason: none  Recent labs: none

## 2020-04-20 RX ORDER — ACETAMINOPHEN AND CODEINE PHOSPHATE 300; 30 MG/1; MG/1
TABLET ORAL
Qty: 50 TABLET | Refills: 0 | OUTPATIENT
Start: 2020-04-20

## 2020-04-21 RX ORDER — ACETAMINOPHEN AND CODEINE PHOSPHATE 300; 30 MG/1; MG/1
TABLET ORAL
Qty: 50 TABLET | Refills: 0 | OUTPATIENT
Start: 2020-04-21

## 2020-04-21 RX ORDER — SEMAGLUTIDE 1.34 MG/ML
INJECTION, SOLUTION SUBCUTANEOUS
Qty: 3 ML | Refills: 0 | Status: SHIPPED | OUTPATIENT
Start: 2020-04-21 | End: 2020-05-20

## 2020-04-21 NOTE — TELEPHONE ENCOUNTER
I called patient since she was not yet scheduled and set up telephone visit for tomorrow. She is out of Ozempic and is asking for refill urgently.     Future Appointments   Date Time Provider Damon Pittman   4/22/2020  3:20 PM Terrence Winn MD [facility]

## 2020-04-22 ENCOUNTER — VIRTUAL PHONE E/M (OUTPATIENT)
Dept: INTERNAL MEDICINE CLINIC | Facility: CLINIC | Age: 70
End: 2020-04-22
Payer: MEDICARE

## 2020-04-22 DIAGNOSIS — Z51.81 THERAPEUTIC DRUG MONITORING: Primary | ICD-10-CM

## 2020-04-22 DIAGNOSIS — E11.42 TYPE 2 DIABETES MELLITUS WITH DIABETIC POLYNEUROPATHY, WITHOUT LONG-TERM CURRENT USE OF INSULIN (HCC): ICD-10-CM

## 2020-04-22 PROCEDURE — 99441 PHONE E/M BY PHYS 5-10 MIN: CPT | Performed by: INTERNAL MEDICINE

## 2020-04-22 NOTE — PROGRESS NOTES
[facility] Weight Management check in/follow up encounter  Virtual/Telephone Check-In    Harrison Burris verbally consents to a Virtual/Telephone Check-In service on 04/22/20    + snacking   + boredom eating throughout the day   Feels lots of cooking

## 2020-04-30 RX ORDER — OMEPRAZOLE 20 MG/1
CAPSULE, DELAYED RELEASE ORAL
Qty: 90 CAPSULE | Refills: 0 | Status: SHIPPED | OUTPATIENT
Start: 2020-04-30 | End: 2020-08-01

## 2020-04-30 NOTE — TELEPHONE ENCOUNTER
Omeprazole 20 mg  Last OV relevant to medication: 9-6-19  Last refill date: 1-22-20 #/refills: 0  When pt was asked to return for OV: 6 mo.   Upcoming appt/reason: none  Recent labs: 9-6-19: CMP

## 2020-05-07 NOTE — TELEPHONE ENCOUNTER
Left VM for [place]. Confirmed for her the 2 medications Maida is wanting to start for her dad, Aamir. One is norvasc, which he already stated and Entresto, which we are working on get prior authorization for the medication, so he has not started this. Provided her the recommendations from Maida to decrease Novasc dose d/t lethargy symptoms. May need to call new Rx to pharmacy since patient has 10 mg tabs at home. Waiting to hear back from Aamir or [place] if they are will to continue taking Norvasc at 2.5mg before calling into pharmacy.     Date: 5/7/2020    Time of Call: 2:12 PM     Diagnosis:  Heart failure     [ VORB ] Ordering provider: Maida Abdi NP    Order: decrease norvasc to 2.5mg daily and take at night     Order received by: Angely Robert RN              Patient aware~ RX scripts ready for  at . Informed patient to bring photo ID in order to  RX scripts. Appointment 12/5/18 with Dr. Suni Rivas.

## 2020-05-09 DIAGNOSIS — E11.42 TYPE 2 DIABETES MELLITUS WITH DIABETIC POLYNEUROPATHY, WITHOUT LONG-TERM CURRENT USE OF INSULIN (HCC): ICD-10-CM

## 2020-05-11 RX ORDER — TOPIRAMATE 50 MG/1
TABLET, FILM COATED ORAL
Qty: 180 TABLET | Refills: 0 | Status: SHIPPED | OUTPATIENT
Start: 2020-05-11 | End: 2020-07-30

## 2020-05-11 NOTE — TELEPHONE ENCOUNTER
Requesting topiramate 50 mg  LOV: 4/22/20  RTC: one month  Last Relevant Labs: na  Filled: 1/15/20 #180 with 0 refills    No future appointments.     pended

## 2020-05-11 NOTE — TELEPHONE ENCOUNTER
Scheduled pt for this Thursday 5/14/20 @9am. Pt will take Bs and Weight before appointment. Has enough meds. Until appointment date.      Metformin 1000 mg failed protocol due to  Diabetic Medication Protocol Failed5/9 5:57 AM   HgBA1C procedure resulted in

## 2020-05-12 NOTE — TELEPHONE ENCOUNTER
Patient has upcoming appointment scheduled    Future Appointments   Date Time Provider Damon Pittman   5/14/2020  9:00 AM Heather Griffin MD [facility] [place]

## 2020-05-13 RX ORDER — SPIRONOLACTONE 25 MG/1
TABLET ORAL
Qty: 30 TABLET | Refills: 0 | Status: SHIPPED | OUTPATIENT
Start: 2020-05-13 | End: 2020-05-14

## 2020-05-13 RX ORDER — NORTRIPTYLINE HYDROCHLORIDE 25 MG/1
CAPSULE ORAL
Qty: 90 CAPSULE | Refills: 3 | Status: SHIPPED | OUTPATIENT
Start: 2020-05-13 | End: 2021-06-01

## 2020-05-13 RX ORDER — PRAVASTATIN SODIUM 20 MG
TABLET ORAL
Qty: 90 TABLET | Refills: 0 | Status: SHIPPED | OUTPATIENT
Start: 2020-05-13 | End: 2020-08-06

## 2020-05-14 ENCOUNTER — VIRTUAL PHONE E/M (OUTPATIENT)
Dept: INTERNAL MEDICINE CLINIC | Facility: CLINIC | Age: 70
End: 2020-05-14
Payer: MEDICARE

## 2020-05-14 DIAGNOSIS — E66.01 MORBID OBESITY DUE TO EXCESS CALORIES (HCC): ICD-10-CM

## 2020-05-14 DIAGNOSIS — I10 ESSENTIAL HYPERTENSION, BENIGN: Primary | ICD-10-CM

## 2020-05-14 DIAGNOSIS — B37.3 YEAST VAGINITIS: ICD-10-CM

## 2020-05-14 DIAGNOSIS — Z12.39 SCREENING FOR BREAST CANCER: ICD-10-CM

## 2020-05-14 DIAGNOSIS — E78.00 PURE HYPERCHOLESTEROLEMIA: ICD-10-CM

## 2020-05-14 DIAGNOSIS — E11.42 TYPE 2 DIABETES MELLITUS WITH DIABETIC POLYNEUROPATHY, WITHOUT LONG-TERM CURRENT USE OF INSULIN (HCC): ICD-10-CM

## 2020-05-14 PROCEDURE — 99442 PHONE E/M BY PHYS 11-20 MIN: CPT | Performed by: FAMILY MEDICINE

## 2020-05-14 RX ORDER — SPIRONOLACTONE 25 MG/1
TABLET ORAL
Qty: 90 TABLET | Refills: 0 | Status: SHIPPED | OUTPATIENT
Start: 2020-05-14 | End: 2020-08-06

## 2020-05-14 RX ORDER — NYSTATIN 100000 U/G
1 CREAM TOPICAL 2 TIMES DAILY
Qty: 60 G | Refills: 0 | Status: SHIPPED | OUTPATIENT
Start: 2020-05-14

## 2020-05-14 NOTE — PROGRESS NOTES
Virtual Telephone Check-In    Brittanie Muñoz verbally consents to a Virtual/Telephone Check-In visit on 05/14/20. Patient understands and accepts financial responsibility for any deductible, co-insurance and/or co-pays associated with this service. Cap TAKE 1 CAPSULE BY MOUTH TWICE DAILY 180 capsule 0   • JARDIANCE 10 MG Oral Tab TAKE 1 TABLET BY MOUTH EVERY MORNING 30 tablet 1   • OZEMPIC, 1 MG/DOSE, 2 MG/1.5ML Subcutaneous Solution Pen-injector INJECT 1 MG UNDER THE SKIN ONCE A WEEK 3 mL 0   • ONET There were no vitals taken for this visit. GENERAL: Patient appears to be their nl self over the phone   Appropriate w conversation.    Does not appear to be in any respiratory distress   speech is nl      ASSESSMENT AND PLAN:   (I10) Essential hypertens

## 2020-05-20 ENCOUNTER — HOSPITAL ENCOUNTER (OUTPATIENT)
Dept: MAMMOGRAPHY | Age: 70
Discharge: HOME | End: 2020-05-20
Attending: FAMILY MEDICINE
Payer: MEDICARE

## 2020-05-20 ENCOUNTER — APPOINTMENT (OUTPATIENT)
Dept: LAB | Age: 70
End: 2020-05-20
Attending: FAMILY MEDICINE
Payer: MEDICARE

## 2020-05-20 DIAGNOSIS — E78.00 PURE HYPERCHOLESTEROLEMIA: ICD-10-CM

## 2020-05-20 DIAGNOSIS — Z12.39 SCREENING FOR BREAST CANCER: ICD-10-CM

## 2020-05-20 DIAGNOSIS — E11.42 TYPE 2 DIABETES MELLITUS WITH DIABETIC POLYNEUROPATHY, WITHOUT LONG-TERM CURRENT USE OF INSULIN (HCC): ICD-10-CM

## 2020-05-20 PROCEDURE — 83036 HEMOGLOBIN GLYCOSYLATED A1C: CPT

## 2020-05-20 PROCEDURE — 36415 COLL VENOUS BLD VENIPUNCTURE: CPT

## 2020-05-20 PROCEDURE — 82043 UR ALBUMIN QUANTITATIVE: CPT

## 2020-05-20 PROCEDURE — 80053 COMPREHEN METABOLIC PANEL: CPT

## 2020-05-20 PROCEDURE — 82570 ASSAY OF URINE CREATININE: CPT

## 2020-05-20 PROCEDURE — 80061 LIPID PANEL: CPT

## 2020-05-20 PROCEDURE — 77063 BREAST TOMOSYNTHESIS BI: CPT | Performed by: FAMILY MEDICINE

## 2020-05-20 PROCEDURE — 77067 SCR MAMMO BI INCL CAD: CPT | Performed by: FAMILY MEDICINE

## 2020-05-20 NOTE — TELEPHONE ENCOUNTER
Requesting ozempic and Jardiance  LOV: 4/22/20  RTC: 4 weeks  Last Relevant Labs: 4/20/20  Filled: 4/21/20 #2 pens with 0 refills  Ozempic  Filled: 3/5/20 #30 with 1 refills  Jardiance    Future Appointments   Date Time Provider Damon Pittman   5/28/20 [Dear  ___] : Dear  [unfilled], [Consult Letter:] : I had the pleasure of evaluating your patient, [unfilled]. [Please see my note below.] : Please see my note below. [Consult Closing:] : Thank you very much for allowing me to participate in the care of this patient.  If you have any questions, please do not hesitate to contact me. [Sincerely,] : Sincerely, [DrNino  ___] : Dr. KRUEGER [Severiano Costello MD] : Severiano Costello MD

## 2020-05-27 RX ORDER — SEMAGLUTIDE 1.34 MG/ML
INJECTION, SOLUTION SUBCUTANEOUS
Qty: 3 ML | Refills: 0 | Status: SHIPPED | OUTPATIENT
Start: 2020-05-27 | End: 2020-07-30

## 2020-05-27 RX ORDER — EMPAGLIFLOZIN 10 MG/1
TABLET, FILM COATED ORAL
Qty: 30 TABLET | Refills: 0 | Status: SHIPPED | OUTPATIENT
Start: 2020-05-27 | End: 2020-07-30

## 2020-05-27 NOTE — TELEPHONE ENCOUNTER
Will only refill both meds for quant 30 days, until her appt with wade.  Ordering provider is out on maternity leave

## 2020-05-27 NOTE — TELEPHONE ENCOUNTER
Patient is scheduled with Abril Carrasco in June. Will you allow refills until seen? She was scheduled with Dr. Caitlin Cotton for 5/28/20 and obviously cancelled by us.

## 2020-06-03 RX ORDER — ACETAMINOPHEN AND CODEINE PHOSPHATE 300; 30 MG/1; MG/1
TABLET ORAL
Qty: 50 TABLET | Refills: 0 | Status: SHIPPED | OUTPATIENT
Start: 2020-06-03 | End: 2020-07-03

## 2020-06-03 RX ORDER — DICYCLOMINE HYDROCHLORIDE 10 MG/1
CAPSULE ORAL
Qty: 120 CAPSULE | Refills: 0 | Status: SHIPPED | OUTPATIENT
Start: 2020-06-03 | End: 2020-08-09

## 2020-06-03 NOTE — TELEPHONE ENCOUNTER
DICYCLOMINE 10MG CAPSULES  Protocol Failed   Filled: 3/30/2020 #120, 0 refills    ACETAMINOPHEN/COD #3 (300/30MG) TAB  No Protocol   Filled: 4/17/2020 #50, 0 refills     LOV: virtual phone e/m on 5/14/2020  RTC: none noted   Upcoming OV: none scheduled

## 2020-06-22 RX ORDER — EMPAGLIFLOZIN 10 MG/1
TABLET, FILM COATED ORAL
Qty: 30 TABLET | Refills: 0 | OUTPATIENT
Start: 2020-06-22

## 2020-06-22 NOTE — TELEPHONE ENCOUNTER
Requesting Jardiance 10 mg  LOV: 4/22/20  RTC: one month  Last Relevant Labs: 5/20/20  Filled: 5/27/20 #30 with 0 refills    No future appointments. Pt was no show for her appt with Ean Robles on 6/12/20.   Per Paige Boothe she would allow one month until seen

## 2020-07-02 NOTE — TELEPHONE ENCOUNTER
Acetaminophen-codeine #3 last flled 6/3/20 #50    Pregabalin 150mg last filled 3/30/20 #180    LOV 5/14/20 - virtual phone     RTC - none    Last labs 5/20/20

## 2020-07-03 RX ORDER — ACETAMINOPHEN AND CODEINE PHOSPHATE 300; 30 MG/1; MG/1
TABLET ORAL
Qty: 50 TABLET | Refills: 0 | Status: SHIPPED | OUTPATIENT
Start: 2020-07-03 | End: 2020-08-07

## 2020-07-03 RX ORDER — PREGABALIN 150 MG/1
CAPSULE ORAL
Qty: 180 CAPSULE | Refills: 0 | Status: SHIPPED | OUTPATIENT
Start: 2020-07-03 | End: 2020-10-08

## 2020-07-15 ENCOUNTER — TELEPHONE (OUTPATIENT)
Dept: INTERNAL MEDICINE CLINIC | Facility: CLINIC | Age: 70
End: 2020-07-15

## 2020-07-15 RX ORDER — SEMAGLUTIDE 1.34 MG/ML
1 INJECTION, SOLUTION SUBCUTANEOUS WEEKLY
Qty: 3 ML | Refills: 0 | OUTPATIENT
Start: 2020-07-15

## 2020-07-15 NOTE — TELEPHONE ENCOUNTER
Requesting Ozempic faxed request  LOV: 4/22/20  RTC: one month  Last Relevant Labs: 5/20/20  Filled: 5/27/20 #2 pens with 0 refills    No future appointments.       Pt was no show for her appt with Abril Carrasco on 6/12/20.   Per Chiki Weldon she would allow one mon

## 2020-07-28 ENCOUNTER — TELEPHONE (OUTPATIENT)
Dept: INTERNAL MEDICINE CLINIC | Facility: CLINIC | Age: 70
End: 2020-07-28

## 2020-07-28 NOTE — TELEPHONE ENCOUNTER
Requesting Jardiance and Ozempic. Patient was no show for her appointment in June    Last refills were given as one month only in May and patient was told to schedule. She will have to wait for her appointment now.   May 27, 2020   Teresa Duenas [place]

## 2020-07-30 ENCOUNTER — VIRTUAL PHONE E/M (OUTPATIENT)
Dept: INTERNAL MEDICINE CLINIC | Facility: CLINIC | Age: 70
End: 2020-07-30

## 2020-07-30 DIAGNOSIS — Z51.81 THERAPEUTIC DRUG MONITORING: Primary | ICD-10-CM

## 2020-07-30 DIAGNOSIS — I10 ESSENTIAL HYPERTENSION, BENIGN: ICD-10-CM

## 2020-07-30 DIAGNOSIS — E11.42 TYPE 2 DIABETES MELLITUS WITH DIABETIC POLYNEUROPATHY, WITHOUT LONG-TERM CURRENT USE OF INSULIN (HCC): ICD-10-CM

## 2020-07-30 PROCEDURE — 99213 OFFICE O/P EST LOW 20 MIN: CPT | Performed by: PHYSICIAN ASSISTANT

## 2020-07-30 RX ORDER — TOPIRAMATE 50 MG/1
50 TABLET, FILM COATED ORAL 2 TIMES DAILY
Qty: 180 TABLET | Refills: 0 | Status: SHIPPED | OUTPATIENT
Start: 2020-07-30 | End: 2020-11-12

## 2020-07-30 RX ORDER — TOPIRAMATE 25 MG/1
25 TABLET ORAL 2 TIMES DAILY
Qty: 180 TABLET | Refills: 0 | Status: SHIPPED | OUTPATIENT
Start: 2020-07-30 | End: 2020-11-12

## 2020-07-30 RX ORDER — SEMAGLUTIDE 1.34 MG/ML
1 INJECTION, SOLUTION SUBCUTANEOUS WEEKLY
Qty: 3 ML | Refills: 2 | Status: SHIPPED | OUTPATIENT
Start: 2020-07-30 | End: 2020-12-29

## 2020-07-30 NOTE — PROGRESS NOTES
Virtual Telephone Check-In    Faustino Gandhi verbally consents to a Virtual/Telephone Check-In visit on 7/30/2020. Patient understands and accepts financial responsibility for any deductible, co-insurance and/or co-pays associated with this service. breathing    Assessment/Plan:   Diagnoses and all orders for this visit:    Therapeutic drug monitoring    BMI 50.0-59.9, adult ([facility]    Type 2 diabetes mellitus with diabetic polyneuropathy, without long-term current use of insulin ([facility]    Essential hype could be performed. Every conscious effort was taken to allow for sufficient and adequate time. This billing was spent on reviewing labs, medications, radiology tests and decision making.   Appropriate medical decision-making and tests are ordered as deta

## 2020-07-31 NOTE — TELEPHONE ENCOUNTER
Omeprazole 20 mg  Last OV relevant to medication: 5-14-20  Last refill date: 4-30-20 #/refills: 0  When pt was asked to return for OV: none  Upcoming appt/reason: none  Recent labs: 5-20-20: CMP

## 2020-08-01 RX ORDER — OMEPRAZOLE 20 MG/1
CAPSULE, DELAYED RELEASE ORAL
Qty: 90 CAPSULE | Refills: 0 | Status: SHIPPED | OUTPATIENT
Start: 2020-08-01 | End: 2020-11-03

## 2020-08-05 DIAGNOSIS — E11.42 TYPE 2 DIABETES MELLITUS WITH DIABETIC POLYNEUROPATHY, WITHOUT LONG-TERM CURRENT USE OF INSULIN (HCC): ICD-10-CM

## 2020-08-06 RX ORDER — PRAVASTATIN SODIUM 20 MG
TABLET ORAL
Qty: 90 TABLET | Refills: 0 | Status: SHIPPED | OUTPATIENT
Start: 2020-08-06 | End: 2020-11-04

## 2020-08-06 RX ORDER — SPIRONOLACTONE 25 MG/1
TABLET ORAL
Qty: 90 TABLET | Refills: 0 | Status: SHIPPED | OUTPATIENT
Start: 2020-08-06 | End: 2020-11-04

## 2020-08-06 NOTE — TELEPHONE ENCOUNTER
METFORMIN 1000MG TABLETS  Protocol Failed   Filled: 5/13/2020 #180 0 refills   PRAVASTATIN 20MG TABLETS  Protocol Passed   Filled: 5/13/2020 #90 0 refill   SPIRONOLACTONE 25MG TABLETS  Protocol Failed   Filled: 5/14/2020 #90 0 refill     LOV: 5/14/2020   R

## 2020-08-07 RX ORDER — ACETAMINOPHEN AND CODEINE PHOSPHATE 300; 30 MG/1; MG/1
TABLET ORAL
Qty: 50 TABLET | Refills: 0 | Status: SHIPPED | OUTPATIENT
Start: 2020-08-07 | End: 2020-09-08

## 2020-08-07 NOTE — TELEPHONE ENCOUNTER
Refill requested:   Requested Prescriptions     Pending Prescriptions Disp Refills   • ACETAMINOPHEN-CODEINE #3 300-30 MG Oral Tab [Pharmacy Med Name: ACETAMINOPHEN/COD #3 (300/30MG) TAB] 50 tablet 0     Sig: TAKE 1 TABLET BY MOUTH EVERY 4 HOURS AS NEEDED

## 2020-08-08 NOTE — TELEPHONE ENCOUNTER
LOV: 5/14/2020 with Dr. Brook Mason   RTC: no follow-up on file  Last Relevant Labs: 5/20/2020   Filled: 6/3/2020  #120 with 0 refills    No future appointments.

## 2020-08-09 RX ORDER — DICYCLOMINE HYDROCHLORIDE 10 MG/1
CAPSULE ORAL
Qty: 120 CAPSULE | Refills: 0 | Status: SHIPPED | OUTPATIENT
Start: 2020-08-09 | End: 2020-10-08

## 2020-08-21 ENCOUNTER — TELEPHONE (OUTPATIENT)
Dept: INTERNAL MEDICINE CLINIC | Facility: CLINIC | Age: 70
End: 2020-08-21

## 2020-08-21 NOTE — TELEPHONE ENCOUNTER
Pt would like to request antibiotic for thrush on tongue, pt denied an apt to be seen   [place] [address], [phone], [phone]

## 2020-08-24 ENCOUNTER — OFFICE VISIT (OUTPATIENT)
Dept: INTERNAL MEDICINE CLINIC | Facility: CLINIC | Age: 70
End: 2020-08-24
Payer: MEDICARE

## 2020-08-24 VITALS
WEIGHT: 233 LBS | TEMPERATURE: 97 F | RESPIRATION RATE: 16 BRPM | HEIGHT: 58 IN | SYSTOLIC BLOOD PRESSURE: 122 MMHG | HEART RATE: 85 BPM | DIASTOLIC BLOOD PRESSURE: 78 MMHG | OXYGEN SATURATION: 98 % | BODY MASS INDEX: 48.91 KG/M2

## 2020-08-24 DIAGNOSIS — B37.0 ORAL THRUSH: Primary | ICD-10-CM

## 2020-08-24 PROCEDURE — 99213 OFFICE O/P EST LOW 20 MIN: CPT | Performed by: FAMILY MEDICINE

## 2020-08-24 RX ORDER — CLOTRIMAZOLE 10 MG/1
10 LOZENGE ORAL; TOPICAL
Qty: 70 TROCHE | Refills: 0 | Status: SHIPPED | OUTPATIENT
Start: 2020-08-24 | End: 2020-09-07

## 2020-08-24 NOTE — PROGRESS NOTES
CHIEF COMPLAINT:     Patient presents with: Thrush: c/o thrust on tongue       HPI:   Alonso Fitch is a 72year old female . The patient complains of possible thrush. It is located on the tongue. Pt has had for 2 weeks.  Pt c/o a dry, cotton feeling NORTRIPTYLINE HCL 25 MG Oral Cap TAKE ONE CAPSULE BY MOUTH EVERY NIGHT AT BEDTIME 90 capsule 3   • OZEMPIC, 1 MG/DOSE, 2 MG/1.5ML Subcutaneous Solution Pen-injector INJECT 1 MG UNDER THE SKIN ONCE A WEEK 3 mL 0   • ONETOUCH DELICA LANCETS 83V Does not appl MUSCULOSKELETAL: no arthralgia or swollen joints  LYMPH:  Denies lymphadenopathy  NEURO: Denies headaches or lightheadedness      EXAM:   /78   Pulse 85   Temp 97.2 °F (36.2 °C) (Temporal)   Resp 16   Ht 58\"   Wt 233 lb (105.7 kg)   SpO2 98%   BMI

## 2020-09-08 RX ORDER — ACETAMINOPHEN AND CODEINE PHOSPHATE 300; 30 MG/1; MG/1
TABLET ORAL
Qty: 50 TABLET | Refills: 0 | Status: SHIPPED | OUTPATIENT
Start: 2020-09-08 | End: 2020-10-05

## 2020-10-05 RX ORDER — ACETAMINOPHEN AND CODEINE PHOSPHATE 300; 30 MG/1; MG/1
TABLET ORAL
Qty: 50 TABLET | Refills: 0 | Status: SHIPPED | OUTPATIENT
Start: 2020-10-05 | End: 2020-11-03

## 2020-10-07 ENCOUNTER — TELEPHONE (OUTPATIENT)
Dept: INTERNAL MEDICINE CLINIC | Facility: CLINIC | Age: 70
End: 2020-10-07

## 2020-10-07 NOTE — TELEPHONE ENCOUNTER
Incoming fax from [facility] with Diabetic eye exam     No diabetic retinopathy      Entered into epic     Placed in Dr. Parveen Bergeron in basket for further review

## 2020-10-08 RX ORDER — DICYCLOMINE HYDROCHLORIDE 10 MG/1
CAPSULE ORAL
Qty: 120 CAPSULE | Refills: 0 | Status: SHIPPED | OUTPATIENT
Start: 2020-10-08 | End: 2020-11-04

## 2020-10-08 RX ORDER — PREGABALIN 150 MG/1
CAPSULE ORAL
Qty: 180 CAPSULE | Refills: 0 | Status: SHIPPED | OUTPATIENT
Start: 2020-10-08 | End: 2021-01-04

## 2020-10-08 NOTE — TELEPHONE ENCOUNTER
Name from pharmacy: DICYCLOMINE 10MG CAPSULES          Will file in chart as: DICYCLOMINE HCL 10 MG Oral Cap    Sig: TAKE 1 CAPSULE BY MOUTH FOUR TIMES DAILY BEFORE MEALS AND AT BEDTIME    Disp:  120 capsule    Refills:  0 (Pharmacy requested: Not specifie

## 2020-11-03 DIAGNOSIS — E11.42 TYPE 2 DIABETES MELLITUS WITH DIABETIC POLYNEUROPATHY, WITHOUT LONG-TERM CURRENT USE OF INSULIN (HCC): ICD-10-CM

## 2020-11-03 RX ORDER — ACETAMINOPHEN AND CODEINE PHOSPHATE 300; 30 MG/1; MG/1
TABLET ORAL
Qty: 50 TABLET | Refills: 0 | Status: SHIPPED | OUTPATIENT
Start: 2020-11-03 | End: 2020-12-06

## 2020-11-03 RX ORDER — OMEPRAZOLE 20 MG/1
CAPSULE, DELAYED RELEASE ORAL
Qty: 90 CAPSULE | Refills: 1 | Status: SHIPPED | OUTPATIENT
Start: 2020-11-03 | End: 2021-04-23

## 2020-11-03 NOTE — TELEPHONE ENCOUNTER
Requesting Topiramate 25 mg   LOV: 7/30/20  RTC: 2-3 months  Last Relevant Labs: na  Filled: 7/30/20 #180 with 0 refills    No future appointments. appt due. I tried to call patient - spoke with someone and they hung up on me.

## 2020-11-03 NOTE — TELEPHONE ENCOUNTER
LOV: 8/24/2020 with Harley Aguero NP  RTC: no follow-up on file  Last Relevant Labs: 5/20/2020   Filled: 8/1/2020 (Omeprazole 20 mg)   #90 with 0 refills  10/5/2020 (Acetaminophen-Codeine #3)  #50 with 0 refills    No future appointments.

## 2020-11-03 NOTE — TELEPHONE ENCOUNTER
LOV: 8/24/2020 with Estefany Forbes NP   RTC: no follow-up on file  Last Relevant Labs: 5/20/2020   Filled: 8/6/2020  #3-month supply with 0 refills  Dicyclomine 10 mg-- 10/8/2020   #120 with 0 refills       No future appointments.

## 2020-11-04 RX ORDER — SPIRONOLACTONE 25 MG/1
TABLET ORAL
Qty: 90 TABLET | Refills: 0 | Status: SHIPPED | OUTPATIENT
Start: 2020-11-04 | End: 2021-02-26

## 2020-11-04 RX ORDER — DICYCLOMINE HYDROCHLORIDE 10 MG/1
CAPSULE ORAL
Qty: 120 CAPSULE | Refills: 0 | Status: SHIPPED | OUTPATIENT
Start: 2020-11-04 | End: 2020-12-04

## 2020-11-04 RX ORDER — PRAVASTATIN SODIUM 20 MG
TABLET ORAL
Qty: 90 TABLET | Refills: 0 | Status: SHIPPED | OUTPATIENT
Start: 2020-11-04 | End: 2021-02-12

## 2020-11-06 NOTE — TELEPHONE ENCOUNTER
Requesting Jardiance and topiramate  LOV: 7/30/20  RTC: 2-3 months  Last Relevant Labs: 5/20/20  Filled: 7/30/20 #30 with 2 refills  Jardiance  Filled: 7/30/20 #180 with 0 refills both doses of topiramate    No future appointments. Due for a visit.     Trudy Phalen

## 2020-11-10 RX ORDER — EMPAGLIFLOZIN 10 MG/1
TABLET, FILM COATED ORAL
Qty: 30 TABLET | Refills: 2 | OUTPATIENT
Start: 2020-11-10

## 2020-11-10 RX ORDER — TOPIRAMATE 25 MG/1
TABLET ORAL
Qty: 180 TABLET | Refills: 0 | OUTPATIENT
Start: 2020-11-10

## 2020-11-10 RX ORDER — TOPIRAMATE 50 MG/1
TABLET, FILM COATED ORAL
Qty: 180 TABLET | Refills: 0 | OUTPATIENT
Start: 2020-11-10

## 2020-11-10 NOTE — TELEPHONE ENCOUNTER
I have tried to reach patient. She has not scheduled. I denied the refill with note to call us to schedule.

## 2020-11-12 RX ORDER — TOPIRAMATE 25 MG/1
25 TABLET ORAL 2 TIMES DAILY
Qty: 180 TABLET | Refills: 0 | Status: SHIPPED | OUTPATIENT
Start: 2020-11-12 | End: 2021-06-01

## 2020-11-12 RX ORDER — EMPAGLIFLOZIN 10 MG/1
1 TABLET, FILM COATED ORAL EVERY MORNING
Qty: 90 TABLET | Refills: 0 | Status: SHIPPED | OUTPATIENT
Start: 2020-11-12 | End: 2021-02-26

## 2020-11-12 RX ORDER — TOPIRAMATE 50 MG/1
50 TABLET, FILM COATED ORAL 2 TIMES DAILY
Qty: 180 TABLET | Refills: 0 | Status: SHIPPED | OUTPATIENT
Start: 2020-11-12 | End: 2021-02-26

## 2020-12-04 RX ORDER — DICYCLOMINE HYDROCHLORIDE 10 MG/1
CAPSULE ORAL
Qty: 120 CAPSULE | Refills: 0 | Status: SHIPPED | OUTPATIENT
Start: 2020-12-04 | End: 2020-12-29

## 2020-12-04 NOTE — TELEPHONE ENCOUNTER
Requesting   Requested Prescriptions     Pending Prescriptions Disp Refills   • OZEMPIC, 1 MG/DOSE, 2 MG/1.5ML Subcutaneous Solution Pen-injector 3 mL 2     Sig: Inject 1 mg into the skin once a week.        LOV: 7/30/20 VTE  RTC: 2-3 month  Filled: 7/30/20

## 2020-12-05 NOTE — TELEPHONE ENCOUNTER
Name from pharmacy: ACETAMINOPHEN/COD #3 (300/30MG) TAB          Will file in chart as: ACETAMINOPHEN-CODEINE #3 300-30 MG Oral Tab    Sig: TAKE 1 TABLET BY MOUTH EVERY 4 HOURS AS NEEDED    Disp:  50 tablet    Refills:  0 (Pharmacy requested: Not specified

## 2020-12-06 RX ORDER — ACETAMINOPHEN AND CODEINE PHOSPHATE 300; 30 MG/1; MG/1
TABLET ORAL
Qty: 50 TABLET | Refills: 0 | Status: SHIPPED | OUTPATIENT
Start: 2020-12-06 | End: 2021-01-04

## 2020-12-29 RX ORDER — DICYCLOMINE HYDROCHLORIDE 10 MG/1
CAPSULE ORAL
Qty: 120 CAPSULE | Refills: 0 | Status: SHIPPED | OUTPATIENT
Start: 2020-12-29 | End: 2021-06-17

## 2020-12-29 RX ORDER — SEMAGLUTIDE 1.34 MG/ML
1 INJECTION, SOLUTION SUBCUTANEOUS WEEKLY
Qty: 3 ML | Refills: 2 | Status: SHIPPED | OUTPATIENT
Start: 2020-12-29

## 2021-01-04 RX ORDER — ACETAMINOPHEN AND CODEINE PHOSPHATE 300; 30 MG/1; MG/1
TABLET ORAL
Qty: 50 TABLET | Refills: 0 | Status: SHIPPED | OUTPATIENT
Start: 2021-01-04 | End: 2021-02-15

## 2021-01-04 RX ORDER — PREGABALIN 150 MG/1
CAPSULE ORAL
Qty: 180 CAPSULE | Refills: 0 | Status: SHIPPED | OUTPATIENT
Start: 2021-01-04 | End: 2021-04-12

## 2021-01-28 DIAGNOSIS — E11.42 TYPE 2 DIABETES MELLITUS WITH DIABETIC POLYNEUROPATHY, WITHOUT LONG-TERM CURRENT USE OF INSULIN (HCC): ICD-10-CM

## 2021-01-28 RX ORDER — TOPIRAMATE 25 MG/1
TABLET ORAL
Qty: 180 TABLET | Refills: 0 | OUTPATIENT
Start: 2021-01-28

## 2021-01-28 RX ORDER — TOPIRAMATE 50 MG/1
TABLET, FILM COATED ORAL
Qty: 180 TABLET | Refills: 0 | OUTPATIENT
Start: 2021-01-28

## 2021-01-28 RX ORDER — EMPAGLIFLOZIN 10 MG/1
1 TABLET, FILM COATED ORAL EVERY MORNING
Qty: 90 TABLET | Refills: 0 | OUTPATIENT
Start: 2021-01-28

## 2021-01-28 NOTE — TELEPHONE ENCOUNTER
Requesting Jardiance and topiramate  LOV: 7/30/20  RTC: 2-3 months  Last Relevant Labs: 5/20/20  Filled: 11/12/20 #3 month with 0 refills for both doses of topiramate and jardiance    No future appointments. I tried calling patient and cannot leave voicemail. She was asked to schedule previously and never did. Can I deny these?

## 2021-01-29 RX ORDER — PRAVASTATIN SODIUM 20 MG
TABLET ORAL
Qty: 90 TABLET | Refills: 0 | OUTPATIENT
Start: 2021-01-29

## 2021-01-29 RX ORDER — SPIRONOLACTONE 25 MG/1
TABLET ORAL
Qty: 90 TABLET | Refills: 0 | OUTPATIENT
Start: 2021-01-29

## 2021-01-29 RX ORDER — DICYCLOMINE HYDROCHLORIDE 10 MG/1
CAPSULE ORAL
Qty: 120 CAPSULE | Refills: 0 | OUTPATIENT
Start: 2021-01-29

## 2021-01-29 NOTE — TELEPHONE ENCOUNTER
Pt is scheduled for 2/10/2021 with  for AWV. Pt has enough meds. To get her to the appointment date. Pt is asking if she can get an antibodies test for COVID and Labs Pre ordered. Please advise.

## 2021-02-02 DIAGNOSIS — Z23 NEED FOR VACCINATION: ICD-10-CM

## 2021-02-03 NOTE — TELEPHONE ENCOUNTER
Requesting topamax  LOV: 3/19/19   RTC: 1 month  Filled: 6/13/19 #60 with 0 refills    Future Appointments   Date Time Provider Damon Pittman   8/13/2019 10:00 AM Mirna Tejeda MD [facility]     Last refill states \"PT must schedule an appointme
no

## 2021-02-10 ENCOUNTER — TELEPHONE (OUTPATIENT)
Dept: INTERNAL MEDICINE CLINIC | Facility: CLINIC | Age: 71
End: 2021-02-10

## 2021-02-10 DIAGNOSIS — E11.42 TYPE 2 DIABETES MELLITUS WITH DIABETIC POLYNEUROPATHY, WITHOUT LONG-TERM CURRENT USE OF INSULIN (HCC): Primary | ICD-10-CM

## 2021-02-10 NOTE — TELEPHONE ENCOUNTER
Pt arrived at 11:00 - thought appt time was 11 not 9. Appt rescheduled. Requests orders for lab draw prior to appt.   EDW lab     Future Appointments   Date Time Provider Damon Toya   2/26/2021 11:00 AM Alhaji Espana MD [facility] [place]

## 2021-02-12 ENCOUNTER — TELEPHONE (OUTPATIENT)
Dept: INTERNAL MEDICINE CLINIC | Facility: CLINIC | Age: 71
End: 2021-02-12

## 2021-02-12 RX ORDER — PRAVASTATIN SODIUM 20 MG
20 TABLET ORAL EVERY EVENING
Qty: 90 TABLET | Refills: 0 | Status: SHIPPED | OUTPATIENT
Start: 2021-02-12 | End: 2021-05-12

## 2021-02-12 NOTE — TELEPHONE ENCOUNTER
LOV: 8/24/2020 with Mone Alvarado NP  RTC: no follow-up on file  Last Relevant Labs: 5/20/2020   Filled: 11/4/2020  #90 with 0 refills    Future Appointments   Date Time Provider Damon Pittman   2/26/2021 11:00 AM Costa Pettit MD [facility] [place]

## 2021-02-12 NOTE — TELEPHONE ENCOUNTER
Requesting Topiramate 50 MG Oral Tab  LOV: 03/22/2018  RTC: 1 month  Filled: 02/15/2018 #60 with 2 refills - Mabel Perez    Future Appointments  Date Time Provider Damon Pittman   5/15/2018 10:00 AM Mirna Tejeda MD [facility]   6/12/2018 11:2 [facility] received precert and will admit today. [facility] to transport at [address] completed. Gala Adamson and I discussed with patient. He is agreeable. IM signed. Orders faxed. Nurse to call report to [phone].  Nancy Wu

## 2021-02-15 DIAGNOSIS — E11.42 TYPE 2 DIABETES MELLITUS WITH DIABETIC POLYNEUROPATHY, WITHOUT LONG-TERM CURRENT USE OF INSULIN (HCC): ICD-10-CM

## 2021-02-15 RX ORDER — EMPAGLIFLOZIN 10 MG/1
1 TABLET, FILM COATED ORAL EVERY MORNING
Qty: 30 TABLET | Refills: 0 | OUTPATIENT
Start: 2021-02-15

## 2021-02-15 RX ORDER — ACETAMINOPHEN AND CODEINE PHOSPHATE 300; 30 MG/1; MG/1
TABLET ORAL
Qty: 50 TABLET | Refills: 0 | Status: SHIPPED | OUTPATIENT
Start: 2021-02-15 | End: 2021-04-01

## 2021-02-15 NOTE — TELEPHONE ENCOUNTER
Requesting Jardiance 10 mg  LOV: 7/30/20  RTC: 2-3 months  Last Relevant Labs: 5/20/20  Filled: 11/12/20 #90 with 0 refills    Pt is overdue for a visit - [facility] refused last refills with note to make visit.   Denied this today

## 2021-02-16 NOTE — TELEPHONE ENCOUNTER
Protocol failed   Medication(s) to Refill:   Requested Prescriptions     Pending Prescriptions Disp Refills   • metFORMIN HCl 1000 MG Oral Tab 180 tablet 0     Sig: Take 1 tablet (1,000 mg total) by mouth 2 (two) times daily.        LOV: 8/24/2020   RTC: no

## 2021-02-19 RX ORDER — TOPIRAMATE 50 MG/1
TABLET, FILM COATED ORAL
Qty: 180 TABLET | Refills: 0 | OUTPATIENT
Start: 2021-02-19

## 2021-02-19 RX ORDER — EMPAGLIFLOZIN 10 MG/1
1 TABLET, FILM COATED ORAL EVERY MORNING
Qty: 90 TABLET | Refills: 0 | OUTPATIENT
Start: 2021-02-19

## 2021-02-19 RX ORDER — TOPIRAMATE 25 MG/1
TABLET ORAL
Qty: 180 TABLET | Refills: 0 | OUTPATIENT
Start: 2021-02-19

## 2021-02-19 NOTE — TELEPHONE ENCOUNTER
This was denied previously as patient was told to schedule and still has not.   Denied all again    Requesting Jardiance and topiramate  LOV: 7/30/20  RTC: 2-3 months  Last Relevant Labs: 5/20/20  Filled: 11/12/20 #3 month with 0 refills for both doses of t

## 2021-02-26 ENCOUNTER — OFFICE VISIT (OUTPATIENT)
Dept: INTERNAL MEDICINE CLINIC | Facility: CLINIC | Age: 71
End: 2021-02-26
Payer: MEDICARE

## 2021-02-26 ENCOUNTER — HOSPITAL ENCOUNTER (OUTPATIENT)
Dept: GENERAL RADIOLOGY | Age: 71
Discharge: HOME | End: 2021-02-26
Attending: FAMILY MEDICINE
Payer: MEDICARE

## 2021-02-26 ENCOUNTER — LAB ENCOUNTER (OUTPATIENT)
Dept: LAB | Age: 71
End: 2021-02-26
Attending: FAMILY MEDICINE
Payer: MEDICARE

## 2021-02-26 VITALS
TEMPERATURE: 99 F | SYSTOLIC BLOOD PRESSURE: 102 MMHG | HEIGHT: 58 IN | BODY MASS INDEX: 50.75 KG/M2 | DIASTOLIC BLOOD PRESSURE: 72 MMHG | WEIGHT: 241.75 LBS | HEART RATE: 84 BPM | RESPIRATION RATE: 16 BRPM

## 2021-02-26 DIAGNOSIS — M96.1 POSTLAMINECTOMY SYNDROME, LUMBAR REGION: ICD-10-CM

## 2021-02-26 DIAGNOSIS — I10 ESSENTIAL HYPERTENSION, BENIGN: Primary | ICD-10-CM

## 2021-02-26 DIAGNOSIS — R05.9 COUGH: ICD-10-CM

## 2021-02-26 DIAGNOSIS — Z00.00 ENCOUNTER FOR ANNUAL HEALTH EXAMINATION: ICD-10-CM

## 2021-02-26 DIAGNOSIS — E78.00 PURE HYPERCHOLESTEROLEMIA: ICD-10-CM

## 2021-02-26 DIAGNOSIS — E66.01 MORBID OBESITY DUE TO EXCESS CALORIES (HCC): ICD-10-CM

## 2021-02-26 DIAGNOSIS — K21.9 GASTROESOPHAGEAL REFLUX DISEASE WITHOUT ESOPHAGITIS: ICD-10-CM

## 2021-02-26 DIAGNOSIS — K29.90 GASTRITIS AND GASTRODUODENITIS: ICD-10-CM

## 2021-02-26 DIAGNOSIS — E11.42 TYPE 2 DIABETES MELLITUS WITH DIABETIC POLYNEUROPATHY, WITHOUT LONG-TERM CURRENT USE OF INSULIN (HCC): ICD-10-CM

## 2021-02-26 DIAGNOSIS — E11.42 DIABETIC POLYNEUROPATHY ASSOCIATED WITH TYPE 2 DIABETES MELLITUS (HCC): ICD-10-CM

## 2021-02-26 DIAGNOSIS — K29.70 GASTRITIS AND GASTRODUODENITIS: ICD-10-CM

## 2021-02-26 LAB
ALBUMIN SERPL-MCNC: 4 G/DL (ref 3.4–5)
ALBUMIN/GLOB SERPL: 0.9 {RATIO} (ref 1–2)
ALP LIVER SERPL-CCNC: 184 U/L
ALT SERPL-CCNC: 20 U/L
ANION GAP SERPL CALC-SCNC: 5 MMOL/L (ref 0–18)
AST SERPL-CCNC: 14 U/L (ref 15–37)
BILIRUB SERPL-MCNC: 0.3 MG/DL (ref 0.1–2)
BUN BLD-MCNC: 15 MG/DL (ref 7–18)
BUN/CREAT SERPL: 17.6 (ref 10–20)
CALCIUM BLD-MCNC: 9.4 MG/DL (ref 8.5–10.1)
CHLORIDE SERPL-SCNC: 103 MMOL/L (ref 98–112)
CHOLEST SMN-MCNC: 182 MG/DL (ref ?–200)
CO2 SERPL-SCNC: 29 MMOL/L (ref 21–32)
CREAT BLD-MCNC: 0.85 MG/DL
CREAT UR-SCNC: 120 MG/DL
EST. AVERAGE GLUCOSE BLD GHB EST-MCNC: 140 MG/DL (ref 68–126)
GLOBULIN PLAS-MCNC: 4.4 G/DL (ref 2.8–4.4)
GLUCOSE BLD-MCNC: 118 MG/DL (ref 70–99)
HBA1C MFR BLD HPLC: 6.5 % (ref ?–5.7)
HDLC SERPL-MCNC: 92 MG/DL (ref 40–59)
LDLC SERPL CALC-MCNC: 77 MG/DL (ref ?–100)
M PROTEIN MFR SERPL ELPH: 8.4 G/DL (ref 6.4–8.2)
MICROALBUMIN UR-MCNC: 0.63 MG/DL
MICROALBUMIN/CREAT 24H UR-RTO: 5.3 UG/MG (ref ?–30)
NONHDLC SERPL-MCNC: 90 MG/DL (ref ?–130)
OSMOLALITY SERPL CALC.SUM OF ELEC: 286 MOSM/KG (ref 275–295)
PATIENT FASTING Y/N/NP: YES
PATIENT FASTING Y/N/NP: YES
POTASSIUM SERPL-SCNC: 3.8 MMOL/L (ref 3.5–5.1)
SODIUM SERPL-SCNC: 137 MMOL/L (ref 136–145)
TRIGL SERPL-MCNC: 63 MG/DL (ref 30–149)
VLDLC SERPL CALC-MCNC: 13 MG/DL (ref 0–30)

## 2021-02-26 PROCEDURE — 71046 X-RAY EXAM CHEST 2 VIEWS: CPT | Performed by: FAMILY MEDICINE

## 2021-02-26 PROCEDURE — 82043 UR ALBUMIN QUANTITATIVE: CPT

## 2021-02-26 PROCEDURE — G0439 PPPS, SUBSEQ VISIT: HCPCS | Performed by: FAMILY MEDICINE

## 2021-02-26 PROCEDURE — 83036 HEMOGLOBIN GLYCOSYLATED A1C: CPT

## 2021-02-26 PROCEDURE — 99214 OFFICE O/P EST MOD 30 MIN: CPT | Performed by: FAMILY MEDICINE

## 2021-02-26 PROCEDURE — 36415 COLL VENOUS BLD VENIPUNCTURE: CPT

## 2021-02-26 PROCEDURE — 80061 LIPID PANEL: CPT

## 2021-02-26 PROCEDURE — 80053 COMPREHEN METABOLIC PANEL: CPT

## 2021-02-26 PROCEDURE — 82570 ASSAY OF URINE CREATININE: CPT

## 2021-02-26 RX ORDER — EMPAGLIFLOZIN 10 MG/1
1 TABLET, FILM COATED ORAL EVERY MORNING
Qty: 90 TABLET | Refills: 0 | Status: SHIPPED | OUTPATIENT
Start: 2021-02-26 | End: 2021-05-24

## 2021-02-26 RX ORDER — TOPIRAMATE 50 MG/1
50 TABLET, FILM COATED ORAL 2 TIMES DAILY
Qty: 180 TABLET | Refills: 0 | Status: SHIPPED | OUTPATIENT
Start: 2021-02-26 | End: 2021-06-01

## 2021-02-26 RX ORDER — SPIRONOLACTONE 25 MG/1
25 TABLET ORAL DAILY
Qty: 90 TABLET | Refills: 0 | Status: SHIPPED | OUTPATIENT
Start: 2021-02-26 | End: 2021-06-01

## 2021-02-26 NOTE — PROGRESS NOTES
HPI:   Guillermina Grier is a 72year old female who presents for a Medicare Subsequent Annual Wellness visit (Pt already had Initial Annual Wellness).       Annual Physical due on 02/26/2022        Fall/Risk Assessment abnormal    She has been screened for discussion of advance directives standard forms performed Face to Face with patient and Family/surrogate (if present), and forms available to patient in AVS     She does NOT have a Power of  for [employer] on file in [address] [place] [address] Rd.    Advance care planning recent labs)   Lab Results   Component Value Date    WBC 5.9 01/12/2018    HGB 11.8 (L) 01/12/2018    .0 01/12/2018        ALLERGIES:   She is allergic to zithromax and shellfish.     CURRENT MEDICATIONS:   No outpatient medications have been marked Not new  Worries her though  CARDIOVASCULAR: denies chest pain on exertion  GI: denies abdominal pain,  Does get reflux , not new   : denies dysuria   MUSCULOSKELETAL: denies back pain  NEURO: denies headaches  PSYCHE: denies depression or anxiety  HEM 02/13/2020   • FLUAD High Dose 65 yr and older (58281) 12/05/2018   • FLULAVAL 6 months & older 0.5 ml Prefilled syringe (24696) 10/06/2017   • FLUZONE 6 months and older PFS 0.5 ml (54755) 11/28/2016, 10/06/2017   • Fluvirin, 3 Years & >, Im 10/28/2014 omprazole    Ck UGI d/t her s/s       Cough; nonsmoker    Ck CXR        The patient indicates understanding of these issues and agrees to the plan. Reinforced healthy diet, lifestyle, and exercise. No follow-ups on file.      Mirna Nova MD, 2/26/2021 10/7/2020   Last Dilated Eye Exam 10/7/2020       Bone Density Screening      Dexascan Every two years Last Dexa Scan:   XR DEXA BONE DENSITOMETRY (CPT=77080) 01/29/2019    No flowsheet data found.     Pap and Pelvic      Pap: Every 3 yrs age 21-65 or Pap+H 04/02/2014 3.4 (L)    No flowsheet data found. Creatinine  Annually Creatinine, Serum (mg/dL)   Date Value   04/02/2014 0.51 (L)     Creatinine (mg/dL)   Date Value   05/20/2020 0.70    No flowsheet data found.     Drug Serum Conc  Annually No results

## 2021-02-26 NOTE — PATIENT INSTRUCTIONS
Ryder Velez's SCREENING SCHEDULE   Tests on this list are recommended by your physician but may not be covered, or covered at this frequency, by your insurer. Please check with your insurance carrier before scheduling to verify coverage.    JEREMIE service except at the [facility] Visit    Abdominal aortic aneurysm screening (once between ages 73-68) IPPE only No results found for this or any previous visit.  Limited to patients who meet one of the following criteria:   • Men who are 65-75 ye Annually if high risk No results found for: CHLAMYDIA No flowsheet data found.     Screening Mammogram      Mammogram    Recommend Annually to at least age 76, and as needed after 76 Mammogram due on 05/20/2021 Please get this Mammogram regularly   Immuniza previous visit. This may be covered with your pharmacy  prescription benefits     Recommended Websites for Advanced Directives    [email or web address]  An information packet, including necessary form from the [place]

## 2021-03-08 ENCOUNTER — LAB ENCOUNTER (OUTPATIENT)
Dept: LAB | Age: 71
End: 2021-03-08
Attending: FAMILY MEDICINE
Payer: MEDICARE

## 2021-03-08 DIAGNOSIS — K29.70 GASTRITIS AND GASTRODUODENITIS: ICD-10-CM

## 2021-03-08 DIAGNOSIS — K29.90 GASTRITIS AND GASTRODUODENITIS: ICD-10-CM

## 2021-03-08 DIAGNOSIS — K21.9 GASTROESOPHAGEAL REFLUX DISEASE WITHOUT ESOPHAGITIS: ICD-10-CM

## 2021-03-08 RX ORDER — TOPIRAMATE 50 MG/1
50 TABLET, FILM COATED ORAL 2 TIMES DAILY
Qty: 180 TABLET | Refills: 0 | OUTPATIENT
Start: 2021-03-08

## 2021-03-09 LAB — SARS-COV-2 RNA RESP QL NAA+PROBE: NOT DETECTED

## 2021-03-11 ENCOUNTER — HOSPITAL ENCOUNTER (OUTPATIENT)
Dept: GENERAL RADIOLOGY | Age: 71
Discharge: HOME | End: 2021-03-11
Attending: FAMILY MEDICINE
Payer: MEDICARE

## 2021-03-11 DIAGNOSIS — K29.90 GASTRITIS AND GASTRODUODENITIS: ICD-10-CM

## 2021-03-11 DIAGNOSIS — K29.70 GASTRITIS AND GASTRODUODENITIS: ICD-10-CM

## 2021-03-11 DIAGNOSIS — K21.9 GASTROESOPHAGEAL REFLUX DISEASE WITHOUT ESOPHAGITIS: ICD-10-CM

## 2021-03-11 PROCEDURE — 74246 X-RAY XM UPR GI TRC 2CNTRST: CPT | Performed by: FAMILY MEDICINE

## 2021-03-24 ENCOUNTER — TELEPHONE (OUTPATIENT)
Dept: INTERNAL MEDICINE CLINIC | Facility: CLINIC | Age: 71
End: 2021-03-24

## 2021-03-24 DIAGNOSIS — K29.70 GASTRITIS AND GASTRODUODENITIS: Primary | ICD-10-CM

## 2021-03-24 DIAGNOSIS — K21.9 GASTROESOPHAGEAL REFLUX DISEASE WITHOUT ESOPHAGITIS: ICD-10-CM

## 2021-03-24 DIAGNOSIS — K29.90 GASTRITIS AND GASTRODUODENITIS: Primary | ICD-10-CM

## 2021-03-24 NOTE — TELEPHONE ENCOUNTER
Per message from referral dept, Dr. Scooter Huston is not in network. Would you like to recommend another specialist or have patient contact their insurance plan?

## 2021-03-24 NOTE — TELEPHONE ENCOUNTER
Insurance RTE and Passport both return PCP as Dr. Sanam Mckeon. NOT Dr. Maria Victoria Cardoza. PCP will need to be changed prior to referral and additional OV.     Spoke with patient - explained she needs to call insurance company and have PCP changed and then follow up wit

## 2021-03-24 NOTE — TELEPHONE ENCOUNTER
Patient stated she called the insurance and changed Dr. Nubia Nails to her primary on her insurance card.      Confirmation # [id number]

## 2021-03-24 NOTE — TELEPHONE ENCOUNTER
Front staff member lmtcb with pt and referral dept regarding referral and pcp listed on insurance card.

## 2021-04-01 RX ORDER — ACETAMINOPHEN AND CODEINE PHOSPHATE 300; 30 MG/1; MG/1
1 TABLET ORAL EVERY 4 HOURS PRN
Qty: 50 TABLET | Refills: 0 | Status: SHIPPED | OUTPATIENT
Start: 2021-04-01 | End: 2021-05-16

## 2021-04-01 NOTE — TELEPHONE ENCOUNTER
LOV: 2/26/2021 with Dr. Jenny Quezada  RTC: no follow-up on file   Last Relevant Labs: 2/26/2021   Filled: 2/15/2021  #50 with 0 refills    No future appointments.

## 2021-04-12 RX ORDER — TOPIRAMATE 50 MG/1
TABLET, FILM COATED ORAL
Qty: 180 TABLET | Refills: 0 | OUTPATIENT
Start: 2021-04-12

## 2021-04-12 RX ORDER — PREGABALIN 150 MG/1
CAPSULE ORAL
Qty: 180 CAPSULE | Refills: 0 | Status: SHIPPED | OUTPATIENT
Start: 2021-04-12 | End: 2021-07-09

## 2021-04-12 NOTE — TELEPHONE ENCOUNTER
Requesting Topiramate 50 mg  LOV: 7/30/20  RTC: 2-3 months  Last Relevant Labs: na  Filled: 2/26/21 #180 with 0 refills    Future Appointments   Date Time Provider Damon Pittman   4/19/2021 10:30 AM ANDREEA Linder SGINP E[facility] SUB GI     Pt [facility]

## 2021-04-22 ENCOUNTER — TELEPHONE (OUTPATIENT)
Dept: INTERNAL MEDICINE CLINIC | Facility: CLINIC | Age: 71
End: 2021-04-22

## 2021-04-23 RX ORDER — OMEPRAZOLE 20 MG/1
CAPSULE, DELAYED RELEASE ORAL
Qty: 90 CAPSULE | Refills: 1 | Status: SHIPPED | OUTPATIENT
Start: 2021-04-23 | End: 2021-10-13

## 2021-04-23 NOTE — TELEPHONE ENCOUNTER
Requesting OMEPRAZOLE 20 MG Oral Capsule Delayed Release  LOV: 2/26/21  RTC: NA  Last Relevant Labs: 2/26/21  Filled: 11/3/20 #90 with 1 refills    No future appointments.

## 2021-04-26 RX ORDER — TOPIRAMATE 25 MG/1
TABLET ORAL
Qty: 180 TABLET | Refills: 0 | OUTPATIENT
Start: 2021-04-26

## 2021-04-26 NOTE — TELEPHONE ENCOUNTER
Requesting topiramate 25 mg  LOV: 7/30/20  RTC: 2-3 months  Last Relevant Labs: na  Filled: 2/26/21 #180 with 0 refills  *now getting from PCP  Denied this request and told them to check with PCP    No future appointments.

## 2021-05-12 RX ORDER — PRAVASTATIN SODIUM 20 MG
20 TABLET ORAL EVERY EVENING
Qty: 90 TABLET | Refills: 0 | Status: SHIPPED | OUTPATIENT
Start: 2021-05-12 | End: 2021-08-20

## 2021-05-12 NOTE — TELEPHONE ENCOUNTER
Pravastatin Sodium 20 MG Oral Tab          Sig: Take 1 tablet (20 mg total) by mouth every evening.     Disp:  90 tablet    Refills:  0    Start: 5/11/2021    Class: Normal    Last ordered: 2 months ago by Trino Stovall MD     Cholesterol Medication Protoco

## 2021-05-15 NOTE — TELEPHONE ENCOUNTER
No protocol   Medication(s) to Refill:   Requested Prescriptions     Pending Prescriptions Disp Refills   • Acetaminophen-Codeine #3 300-30 MG Oral Tab 50 tablet 0     Sig: Take 1 tablet by mouth every 4 (four) hours as needed.        LOV: 2/26/2021   RTC: side.&nbsp; It is located directly across from the [place] [facility] [place]. &nbsp;  [place] Hours of Operation:&nbsp; MONDAY-FRIDAY&nbsp; 7:00AM - 2:40PM. Masks are required to be worn upon entering any [facility] facility. child/children without a caregiver.  Because of the highly sensitive equipment and privacy of all our patients, children will not be permitted in the exam rooms, unless otherwise noted and in accordance with departmental policy.    PATIENT RESPONSIBILITY E

## 2021-05-16 RX ORDER — ACETAMINOPHEN AND CODEINE PHOSPHATE 300; 30 MG/1; MG/1
1 TABLET ORAL EVERY 4 HOURS PRN
Qty: 50 TABLET | Refills: 0 | Status: SHIPPED | OUTPATIENT
Start: 2021-05-16 | End: 2021-06-17

## 2021-05-24 DIAGNOSIS — E11.42 TYPE 2 DIABETES MELLITUS WITH DIABETIC POLYNEUROPATHY, WITHOUT LONG-TERM CURRENT USE OF INSULIN (HCC): ICD-10-CM

## 2021-05-31 RX ORDER — TOPIRAMATE 50 MG/1
TABLET, FILM COATED ORAL
Qty: 60 TABLET | Refills: 0 | OUTPATIENT
Start: 2021-05-31

## 2021-05-31 NOTE — TELEPHONE ENCOUNTER
Requesting Topiramate 50 mg  LOV: 7/30/20  RTC: 2-3 months  Last Relevant Labs: na  Filled: 2/26/21 #180 with 0 refills    She gets this from PCP - we no longer see her.   denied

## 2021-06-01 RX ORDER — NORTRIPTYLINE HYDROCHLORIDE 25 MG/1
CAPSULE ORAL
Qty: 90 CAPSULE | Refills: 1 | Status: SHIPPED | OUTPATIENT
Start: 2021-06-01 | End: 2021-11-17

## 2021-06-01 RX ORDER — TOPIRAMATE 50 MG/1
50 TABLET, FILM COATED ORAL 2 TIMES DAILY
Qty: 180 TABLET | Refills: 1 | Status: SHIPPED | OUTPATIENT
Start: 2021-06-01 | End: 2021-11-22

## 2021-06-01 RX ORDER — TOPIRAMATE 25 MG/1
25 TABLET ORAL 2 TIMES DAILY
Qty: 180 TABLET | Refills: 1 | Status: SHIPPED | OUTPATIENT
Start: 2021-06-01 | End: 2021-11-22

## 2021-06-01 RX ORDER — SPIRONOLACTONE 25 MG/1
25 TABLET ORAL DAILY
Qty: 90 TABLET | Refills: 1 | Status: SHIPPED | OUTPATIENT
Start: 2021-06-01 | End: 2021-11-17

## 2021-06-01 NOTE — TELEPHONE ENCOUNTER
No protocol     Last refill:  NORTRIPTYLINE HCL 25 MG Oral Cap 90 capsule 3 5/13/2020    Sig:   TAKE ONE CAPSULE BY MOUTH EVERY NIGHT AT BEDTIME       LOV:   2/26/2021 Dr Jean-Pierre Anderson RTC ?   No FOV scheduled

## 2021-06-01 NOTE — TELEPHONE ENCOUNTER
LOV: 2/26/2021 with Dr. Aristeo Patterson  RTC: no follow-up on file  Last Relevant Labs: 2/26/2021  Filled: 2/26/2021  #3-month supply with 0 refills    Future Appointments   Date Time Provider Damon Pittman   6/3/2021  9:30 AM [facility] [place]

## 2021-06-03 ENCOUNTER — LAB ENCOUNTER (OUTPATIENT)
Dept: LAB | Age: 71
End: 2021-06-03
Attending: NURSE PRACTITIONER
Payer: MEDICARE

## 2021-06-03 ENCOUNTER — HOSPITAL ENCOUNTER (OUTPATIENT)
Dept: ULTRASOUND IMAGING | Age: 71
Discharge: HOME | End: 2021-06-03
Attending: NURSE PRACTITIONER
Payer: MEDICARE

## 2021-06-03 DIAGNOSIS — R74.8 ELEVATED ALKALINE PHOSPHATASE LEVEL: ICD-10-CM

## 2021-06-03 DIAGNOSIS — D64.9 ANEMIA, UNSPECIFIED TYPE: ICD-10-CM

## 2021-06-03 PROCEDURE — 85025 COMPLETE CBC W/AUTO DIFF WBC: CPT

## 2021-06-03 PROCEDURE — 83550 IRON BINDING TEST: CPT

## 2021-06-03 PROCEDURE — 86038 ANTINUCLEAR ANTIBODIES: CPT

## 2021-06-03 PROCEDURE — 82728 ASSAY OF FERRITIN: CPT

## 2021-06-03 PROCEDURE — 76700 US EXAM ABDOM COMPLETE: CPT | Performed by: NURSE PRACTITIONER

## 2021-06-03 PROCEDURE — 82607 VITAMIN B-12: CPT

## 2021-06-03 PROCEDURE — 83516 IMMUNOASSAY NONANTIBODY: CPT

## 2021-06-03 PROCEDURE — 82746 ASSAY OF FOLIC ACID SERUM: CPT

## 2021-06-03 PROCEDURE — 84075 ASSAY ALKALINE PHOSPHATASE: CPT

## 2021-06-03 PROCEDURE — 84080 ASSAY ALKALINE PHOSPHATASES: CPT

## 2021-06-03 PROCEDURE — 83540 ASSAY OF IRON: CPT

## 2021-06-03 PROCEDURE — 36415 COLL VENOUS BLD VENIPUNCTURE: CPT

## 2021-06-09 ENCOUNTER — TELEPHONE (OUTPATIENT)
Dept: INTERNAL MEDICINE CLINIC | Facility: CLINIC | Age: 71
End: 2021-06-09

## 2021-06-09 NOTE — TELEPHONE ENCOUNTER
Patient made an appointment for montly b12 shot.      Future Appointments   Date Time Provider Damon Pittman   6/15/2021 11:00 AM [facility] 08 NURSE EM[facility] 8 [facility] [place]

## 2021-06-15 ENCOUNTER — NURSE ONLY (OUTPATIENT)
Dept: INTERNAL MEDICINE CLINIC | Facility: CLINIC | Age: 71
End: 2021-06-15
Payer: MEDICARE

## 2021-06-15 ENCOUNTER — TELEPHONE (OUTPATIENT)
Dept: INTERNAL MEDICINE CLINIC | Facility: CLINIC | Age: 71
End: 2021-06-15

## 2021-06-15 DIAGNOSIS — E53.8 VITAMIN B12 DEFICIENCY: Primary | ICD-10-CM

## 2021-06-15 PROCEDURE — 96372 THER/PROPH/DIAG INJ SC/IM: CPT | Performed by: FAMILY MEDICINE

## 2021-06-15 RX ORDER — CYANOCOBALAMIN 1000 UG/ML
1000 INJECTION INTRAMUSCULAR; SUBCUTANEOUS ONCE
Status: SHIPPED | OUTPATIENT
Start: 2021-06-15

## 2021-06-15 RX ADMIN — CYANOCOBALAMIN 1000 MCG: 1000 INJECTION INTRAMUSCULAR; SUBCUTANEOUS at 10:56:00

## 2021-06-15 NOTE — TELEPHONE ENCOUNTER
Vitamin B12 order pended for your review and approval if appropriate, please advise. Also, how long would you like patient to continue on monthly B12 injections?

## 2021-06-17 RX ORDER — DICYCLOMINE HYDROCHLORIDE 10 MG/1
10 CAPSULE ORAL 4 TIMES DAILY
Qty: 120 CAPSULE | Refills: 0 | Status: SHIPPED | OUTPATIENT
Start: 2021-06-17 | End: 2021-07-15

## 2021-06-17 RX ORDER — ACETAMINOPHEN AND CODEINE PHOSPHATE 300; 30 MG/1; MG/1
1 TABLET ORAL EVERY 4 HOURS PRN
Qty: 50 TABLET | Refills: 0 | Status: SHIPPED | OUTPATIENT
Start: 2021-06-17 | End: 2021-07-20

## 2021-06-17 NOTE — TELEPHONE ENCOUNTER
LOV: 2/26/2021 with Dr. Keith Weaver  RTC: no follow-up on file  Last Relevant Labs: 6/3/2021   Filled: Acetaminophen-Codeine #3    5/16/2021   #50 with 0 refills  Dicyclomine 10 mg    12/29/2020    #120 with 0 refills    Future Appointments   Date Time Provider

## 2021-07-09 RX ORDER — PREGABALIN 150 MG/1
CAPSULE ORAL
Qty: 180 CAPSULE | Refills: 0 | Status: SHIPPED | OUTPATIENT
Start: 2021-07-09 | End: 2021-10-21

## 2021-07-09 NOTE — TELEPHONE ENCOUNTER
Name from pharmacy: PREGABALIN 150MG CAPSULES          Will file in chart as: PREGABALIN 150 MG Oral Cap    Sig: TAKE 1 CAPSULE BY MOUTH TWICE DAILY    Disp:  180 capsule    Refills:  0 (Pharmacy requested: Not specified)    Start: 7/9/2021    Class: Zara Dandy

## 2021-07-15 DIAGNOSIS — K29.70 GASTRITIS AND GASTRODUODENITIS: Primary | ICD-10-CM

## 2021-07-15 DIAGNOSIS — K21.9 GASTROESOPHAGEAL REFLUX DISEASE WITHOUT ESOPHAGITIS: ICD-10-CM

## 2021-07-15 DIAGNOSIS — K29.90 GASTRITIS AND GASTRODUODENITIS: Primary | ICD-10-CM

## 2021-07-15 RX ORDER — DICYCLOMINE HYDROCHLORIDE 10 MG/1
CAPSULE ORAL
Qty: 120 CAPSULE | Refills: 0 | Status: SHIPPED | OUTPATIENT
Start: 2021-07-15 | End: 2021-08-16

## 2021-07-19 NOTE — TELEPHONE ENCOUNTER
Requesting Acetaminophen-Codeine #3 300-30 MG Oral Tab  LOV: 2/26/21  RTC: NA  Last Relevant Labs: 2/26/21  Filled: 6/17/21 #50 with 0 refills    Future Appointments   Date Time Provider Damon Pittman   7/20/2021 11:00 AM [facility] 08 NURSE EM[facility] 8 [facility]

## 2021-07-20 ENCOUNTER — NURSE ONLY (OUTPATIENT)
Dept: INTERNAL MEDICINE CLINIC | Facility: CLINIC | Age: 71
End: 2021-07-20
Payer: MEDICARE

## 2021-07-20 RX ORDER — ACETAMINOPHEN AND CODEINE PHOSPHATE 300; 30 MG/1; MG/1
TABLET ORAL
Qty: 50 TABLET | Refills: 0 | Status: SHIPPED | OUTPATIENT
Start: 2021-07-20 | End: 2021-08-24

## 2021-08-16 DIAGNOSIS — K21.9 GASTROESOPHAGEAL REFLUX DISEASE WITHOUT ESOPHAGITIS: ICD-10-CM

## 2021-08-16 DIAGNOSIS — K29.90 GASTRITIS AND GASTRODUODENITIS: ICD-10-CM

## 2021-08-16 DIAGNOSIS — K29.70 GASTRITIS AND GASTRODUODENITIS: ICD-10-CM

## 2021-08-16 RX ORDER — DICYCLOMINE HYDROCHLORIDE 10 MG/1
10 CAPSULE ORAL NIGHTLY
Qty: 120 CAPSULE | Refills: 0 | Status: SHIPPED | OUTPATIENT
Start: 2021-08-16 | End: 2021-11-17

## 2021-08-17 ENCOUNTER — NURSE ONLY (OUTPATIENT)
Dept: INTERNAL MEDICINE CLINIC | Facility: CLINIC | Age: 71
End: 2021-08-17
Payer: MEDICARE

## 2021-08-17 ENCOUNTER — TELEPHONE (OUTPATIENT)
Dept: INTERNAL MEDICINE CLINIC | Facility: CLINIC | Age: 71
End: 2021-08-17

## 2021-08-17 PROCEDURE — 96372 THER/PROPH/DIAG INJ SC/IM: CPT | Performed by: FAMILY MEDICINE

## 2021-08-17 RX ADMIN — CYANOCOBALAMIN 1000 MCG: 1000 INJECTION INTRAMUSCULAR; SUBCUTANEOUS at 11:10:00

## 2021-08-17 NOTE — TELEPHONE ENCOUNTER
Sunny Meeks MD  to [facility] 08 Clinical Staff        6/15/21 11:41 AM  Note     Injxns for 6 mo   lucia B12 in 6 mo

## 2021-08-20 RX ORDER — PRAVASTATIN SODIUM 20 MG
20 TABLET ORAL EVERY EVENING
Qty: 90 TABLET | Refills: 0 | Status: SHIPPED | OUTPATIENT
Start: 2021-08-20 | End: 2021-11-17

## 2021-08-20 NOTE — TELEPHONE ENCOUNTER
pravastatin 20 MG Oral Tab          Sig: Take 1 tablet (20 mg total) by mouth every evening.     Disp:  90 tablet    Refills:  0    Start: 8/20/2021    Class: Normal    Last ordered: 3 months ago by Sheeba Pringle MD     Cholesterol Medication Protocol [place]

## 2021-08-21 RX ORDER — ACETAMINOPHEN AND CODEINE PHOSPHATE 300; 30 MG/1; MG/1
TABLET ORAL
Qty: 50 TABLET | Refills: 0 | OUTPATIENT
Start: 2021-08-21

## 2021-08-24 NOTE — TELEPHONE ENCOUNTER
Future Appointments   Date Time Provider Damon Torresi   8/30/2021 11:15 AM Kim Lynn MD [facility] 8 [facility] [place]   9/17/2021 10:30 AM ANDREEA Mcmillan SGINP E[facility] SUB GI   9/17/2021 11:00 AM [facility] 08 NURSE EM[facility] 8 [facility] [place]     Patient scheduled an a

## 2021-08-26 RX ORDER — ACETAMINOPHEN AND CODEINE PHOSPHATE 300; 30 MG/1; MG/1
1 TABLET ORAL EVERY 4 HOURS PRN
Qty: 50 TABLET | Refills: 0 | Status: SHIPPED | OUTPATIENT
Start: 2021-08-26 | End: 2021-09-19

## 2021-08-30 ENCOUNTER — LAB ENCOUNTER (OUTPATIENT)
Dept: LAB | Age: 71
End: 2021-08-30
Attending: FAMILY MEDICINE
Payer: MEDICARE

## 2021-08-30 ENCOUNTER — OFFICE VISIT (OUTPATIENT)
Dept: INTERNAL MEDICINE CLINIC | Facility: CLINIC | Age: 71
End: 2021-08-30
Payer: MEDICARE

## 2021-08-30 VITALS
WEIGHT: 247.19 LBS | SYSTOLIC BLOOD PRESSURE: 110 MMHG | HEART RATE: 68 BPM | DIASTOLIC BLOOD PRESSURE: 68 MMHG | RESPIRATION RATE: 22 BRPM | BODY MASS INDEX: 51.89 KG/M2 | TEMPERATURE: 98 F | OXYGEN SATURATION: 99 % | HEIGHT: 58 IN

## 2021-08-30 DIAGNOSIS — E66.01 MORBID OBESITY DUE TO EXCESS CALORIES (HCC): ICD-10-CM

## 2021-08-30 DIAGNOSIS — E11.42 TYPE 2 DIABETES MELLITUS WITH DIABETIC POLYNEUROPATHY, WITHOUT LONG-TERM CURRENT USE OF INSULIN (HCC): ICD-10-CM

## 2021-08-30 DIAGNOSIS — E78.00 PURE HYPERCHOLESTEROLEMIA: ICD-10-CM

## 2021-08-30 DIAGNOSIS — I10 ESSENTIAL HYPERTENSION, BENIGN: Primary | ICD-10-CM

## 2021-08-30 DIAGNOSIS — M25.561 CHRONIC PAIN OF RIGHT KNEE: ICD-10-CM

## 2021-08-30 DIAGNOSIS — G89.29 CHRONIC PAIN OF RIGHT KNEE: ICD-10-CM

## 2021-08-30 LAB
ALBUMIN SERPL-MCNC: 3.7 G/DL (ref 3.4–5)
ALBUMIN/GLOB SERPL: 0.9 {RATIO} (ref 1–2)
ALP LIVER SERPL-CCNC: 195 U/L
ALT SERPL-CCNC: 24 U/L
ANION GAP SERPL CALC-SCNC: 2 MMOL/L (ref 0–18)
AST SERPL-CCNC: 29 U/L (ref 15–37)
BILIRUB SERPL-MCNC: 0.4 MG/DL (ref 0.1–2)
BUN BLD-MCNC: 15 MG/DL (ref 7–18)
CALCIUM BLD-MCNC: 9 MG/DL (ref 8.5–10.1)
CHLORIDE SERPL-SCNC: 110 MMOL/L (ref 98–112)
CO2 SERPL-SCNC: 28 MMOL/L (ref 21–32)
CREAT BLD-MCNC: 0.74 MG/DL
EST. AVERAGE GLUCOSE BLD GHB EST-MCNC: 157 MG/DL (ref 68–126)
GLOBULIN PLAS-MCNC: 4.1 G/DL (ref 2.8–4.4)
GLUCOSE BLD-MCNC: 99 MG/DL (ref 70–99)
HBA1C MFR BLD HPLC: 7.1 % (ref ?–5.7)
M PROTEIN MFR SERPL ELPH: 7.8 G/DL (ref 6.4–8.2)
OSMOLALITY SERPL CALC.SUM OF ELEC: 291 MOSM/KG (ref 275–295)
PATIENT FASTING Y/N/NP: YES
POTASSIUM SERPL-SCNC: 4.2 MMOL/L (ref 3.5–5.1)
SODIUM SERPL-SCNC: 140 MMOL/L (ref 136–145)
TSI SER-ACNC: 0.75 MIU/ML (ref 0.36–3.74)

## 2021-08-30 PROCEDURE — 80053 COMPREHEN METABOLIC PANEL: CPT

## 2021-08-30 PROCEDURE — 99214 OFFICE O/P EST MOD 30 MIN: CPT | Performed by: FAMILY MEDICINE

## 2021-08-30 PROCEDURE — 36415 COLL VENOUS BLD VENIPUNCTURE: CPT

## 2021-08-30 PROCEDURE — 84443 ASSAY THYROID STIM HORMONE: CPT

## 2021-08-30 PROCEDURE — 83036 HEMOGLOBIN GLYCOSYLATED A1C: CPT

## 2021-08-30 NOTE — PROGRESS NOTES
Keo Prather is a 80year old female. HPI:   Pt is here for med ck/follow up. Overall is doing well. Is taking meds as directed. No issues w medications. Is staying healthy given the [place] pandemic.    Is vaccinated     The R knee is acting up ag times daily.  60 g 0   • OZEMPIC, 1 MG/DOSE, 2 MG/1.5ML Subcutaneous Solution Pen-injector INJECT 1 MG UNDER THE SKIN ONCE A WEEK 3 mL 0   • ONETOUCH DELICA LANCETS 50J Does not apply Misc USE DAILY AS DIRECTED 100 each 0   • ONETOUCH ULTRA BLUE In [employer] headaches    EXAM:   /68 (BP Location: Left arm, Patient Position: Sitting, Cuff Size: large)   Pulse 68   Temp 97.9 °F (36.6 °C) (Oral)   Resp 22   Ht 4' 10\" (1.473 m)   Wt 247 lb 3.2 oz (112.1 kg)   SpO2 99%   BMI 51.66 kg/m²   GENERAL: well devel

## 2021-09-16 ENCOUNTER — TELEPHONE (OUTPATIENT)
Dept: INTERNAL MEDICINE CLINIC | Facility: CLINIC | Age: 71
End: 2021-09-16

## 2021-09-16 ENCOUNTER — NURSE ONLY (OUTPATIENT)
Dept: INTERNAL MEDICINE CLINIC | Facility: CLINIC | Age: 71
End: 2021-09-16
Payer: MEDICARE

## 2021-09-16 DIAGNOSIS — Z12.31 ENCOUNTER FOR SCREENING MAMMOGRAM FOR MALIGNANT NEOPLASM OF BREAST: Primary | ICD-10-CM

## 2021-09-16 DIAGNOSIS — E53.8 VITAMIN B12 DEFICIENCY: Primary | ICD-10-CM

## 2021-09-16 PROCEDURE — 96372 THER/PROPH/DIAG INJ SC/IM: CPT | Performed by: FAMILY MEDICINE

## 2021-09-16 RX ORDER — CYANOCOBALAMIN 1000 UG/ML
1000 INJECTION INTRAMUSCULAR; SUBCUTANEOUS ONCE
Status: SHIPPED | OUTPATIENT
Start: 2021-09-16

## 2021-09-16 RX ADMIN — CYANOCOBALAMIN 1000 MCG: 1000 INJECTION INTRAMUSCULAR; SUBCUTANEOUS at 11:03:00

## 2021-09-16 NOTE — TELEPHONE ENCOUNTER
Patient was here for NV-- states last year she was told she did not need to get the flu vaccine. Patient asking if she will need to get the flu vaccine this year? Patient also requesting order for mammo to be placed. Order pending if appropriate.

## 2021-09-19 RX ORDER — ACETAMINOPHEN AND CODEINE PHOSPHATE 300; 30 MG/1; MG/1
TABLET ORAL
Qty: 50 TABLET | Refills: 0 | Status: SHIPPED | OUTPATIENT
Start: 2021-09-19 | End: 2021-10-25

## 2021-09-23 ENCOUNTER — TELEPHONE (OUTPATIENT)
Dept: INTERNAL MEDICINE CLINIC | Facility: CLINIC | Age: 71
End: 2021-09-23

## 2021-09-23 DIAGNOSIS — E11.42 TYPE 2 DIABETES MELLITUS WITH DIABETIC POLYNEUROPATHY, WITHOUT LONG-TERM CURRENT USE OF INSULIN (HCC): Primary | ICD-10-CM

## 2021-09-23 NOTE — TELEPHONE ENCOUNTER
Amy Collins from [facility] requesting referral on behalf of pt. Pt has appointment on 11/3 with Dr Nikolas James and will most likely need to be seen again in 6 months.      [facility]   Dr. Nikolas James   Phone [phone]  Fax [phone]

## 2021-09-27 ENCOUNTER — TELEPHONE (OUTPATIENT)
Dept: INTERNAL MEDICINE CLINIC | Facility: CLINIC | Age: 71
End: 2021-09-27

## 2021-09-27 DIAGNOSIS — E11.42 TYPE 2 DIABETES MELLITUS WITH DIABETIC POLYNEUROPATHY, WITHOUT LONG-TERM CURRENT USE OF INSULIN (HCC): Primary | ICD-10-CM

## 2021-09-27 NOTE — TELEPHONE ENCOUNTER
Spoke with patient notified Dr. Maurice Valdez is not in net work with [facility]. Patient will call Dr. Maurice Valdez office and call us back.

## 2021-09-27 NOTE — TELEPHONE ENCOUNTER
[facility] Clinical Staff  Good Morning,     Per Jo-Ann Ratliff is not in network with this patient's policy and patient has no out of network benefits. Please refer patient to an in , this referral will be closed.      Chilango Stacy

## 2021-09-28 NOTE — TELEPHONE ENCOUNTER
Pt called back and said she called 's office and he told her they do take her insurance, so pt should be okay to see him.

## 2021-09-29 ENCOUNTER — TELEPHONE (OUTPATIENT)
Dept: INTERNAL MEDICINE CLINIC | Facility: CLINIC | Age: 71
End: 2021-09-29

## 2021-09-29 DIAGNOSIS — E11.9 DIABETIC EYE EXAM (HCC): Primary | ICD-10-CM

## 2021-09-29 DIAGNOSIS — Z01.00 DIABETIC EYE EXAM (HCC): Primary | ICD-10-CM

## 2021-09-29 NOTE — TELEPHONE ENCOUNTER
Incoming fax from [facility]    Requesting a referral for PT    Please fax t [facility] ([phone]) when authorized    Placed in  inbox

## 2021-09-29 NOTE — TELEPHONE ENCOUNTER
Brandon Salter MD  P [facility] Clinical Staff  Who is in her network             Previous Messages       ----- Message -----   From: Jose Mccloud RN   Sent: 9/27/2021   4:22 PM CDT   To: Gene Conley MD   Subject: FW: Out of Network

## 2021-10-06 DIAGNOSIS — E11.42 TYPE 2 DIABETES MELLITUS WITH DIABETIC POLYNEUROPATHY, WITHOUT LONG-TERM CURRENT USE OF INSULIN (HCC): ICD-10-CM

## 2021-10-06 RX ORDER — LANCETS 33 GAUGE
EACH MISCELLANEOUS
Qty: 100 EACH | Refills: 0 | Status: SHIPPED | OUTPATIENT
Start: 2021-10-06

## 2021-10-06 RX ORDER — BLOOD SUGAR DIAGNOSTIC
STRIP MISCELLANEOUS
Qty: 100 STRIP | Refills: 1 | Status: SHIPPED | OUTPATIENT
Start: 2021-10-06 | End: 2022-10-06

## 2021-10-06 RX ORDER — BLOOD-GLUCOSE METER
1 EACH MISCELLANEOUS DAILY
Qty: 1 KIT | Refills: 0 | Status: SHIPPED | OUTPATIENT
Start: 2021-10-06 | End: 2021-10-14

## 2021-10-06 NOTE — TELEPHONE ENCOUNTER
OneTouch Delica Lancets 08T Does not apply Misc          Sig: USE DAILY AS DIRECTED    Disp:  100 each    Refills:  0    Start: 10/6/2021    Class: Normal    Non-formulary For: Type 2 diabetes mellitus with diabetic polyneuropathy, without long-term curren

## 2021-10-06 NOTE — TELEPHONE ENCOUNTER
Pt called stating her glucose monitor has stopped working. Pt would like to know if she can get a new one and if insurance will cover. Pt states she also needs Onetouch test trips and lancets.      ONETOUCH DELICA LANCETS 68M Does not apply Misc  ONETOU

## 2021-10-11 ENCOUNTER — TELEPHONE (OUTPATIENT)
Dept: INTERNAL MEDICINE CLINIC | Facility: CLINIC | Age: 71
End: 2021-10-11

## 2021-10-11 NOTE — TELEPHONE ENCOUNTER
Faxed completed paperwork with confirmation     Placed to scan as well as placed in accordion folder in Pod 3

## 2021-10-13 RX ORDER — OMEPRAZOLE 20 MG/1
CAPSULE, DELAYED RELEASE ORAL
Qty: 90 CAPSULE | Refills: 1 | Status: SHIPPED | OUTPATIENT
Start: 2021-10-13

## 2021-10-13 NOTE — TELEPHONE ENCOUNTER
Name from pharmacy: OMEPRAZOLE 20MG CAPSULES          Will file in chart as: OMEPRAZOLE 20 MG Oral Capsule Delayed Release    Sig: TAKE 1 CAPSULE(20 MG) BY MOUTH DAILY    Disp:  90 capsule    Refills:  1 (Pharmacy requested: Not specified)    Start: 10/12/

## 2021-10-14 DIAGNOSIS — E11.42 TYPE 2 DIABETES MELLITUS WITH DIABETIC POLYNEUROPATHY, WITHOUT LONG-TERM CURRENT USE OF INSULIN (HCC): Primary | ICD-10-CM

## 2021-10-14 RX ORDER — BLOOD-GLUCOSE METER
1 EACH MISCELLANEOUS DAILY
Qty: 1 KIT | Refills: 0 | Status: SHIPPED | OUTPATIENT
Start: 2021-10-14 | End: 2022-10-14

## 2021-10-14 NOTE — TELEPHONE ENCOUNTER
Blood Glucose Monitoring Suppl (ONETOUCH ULTRA 2) w/Device Does not apply Kit         Si Device by Other route daily. Use as directed.     Disp:  1 kit    Refills:  0    Start: 10/14/2021 - 10/14/2022    Class: Normal    Non-formulary    To pharmacy: D

## 2021-10-14 NOTE — TELEPHONE ENCOUNTER
Patient called and stated her glucose meter broke, she is requesting a prescription for a new one.      Please advise

## 2021-10-19 ENCOUNTER — TELEPHONE (OUTPATIENT)
Dept: INTERNAL MEDICINE CLINIC | Facility: CLINIC | Age: 71
End: 2021-10-19

## 2021-10-19 ENCOUNTER — NURSE ONLY (OUTPATIENT)
Dept: INTERNAL MEDICINE CLINIC | Facility: CLINIC | Age: 71
End: 2021-10-19
Payer: MEDICARE

## 2021-10-19 PROCEDURE — 96372 THER/PROPH/DIAG INJ SC/IM: CPT | Performed by: FAMILY MEDICINE

## 2021-10-19 RX ADMIN — CYANOCOBALAMIN 1000 MCG: 1000 INJECTION INTRAMUSCULAR; SUBCUTANEOUS at 11:06:00

## 2021-10-19 NOTE — TELEPHONE ENCOUNTER
Appt scheduled for flu clinic     Pt provided with [employer] vaccine clinic phone #    Future Appointments   Date Time Provider Damon Pittman   10/22/2021 10:30 AM [facility] 08 NURSE [facility] 8 [facility] [place]

## 2021-10-19 NOTE — TELEPHONE ENCOUNTER
Incoming fax from [facility] Detailed Written Order    Requesting:    Yelena Courser in  in Basket for completion

## 2021-10-19 NOTE — TELEPHONE ENCOUNTER
Called patient to inform her that her Testing Kit for her DM is ready at the pharmacy    Left  to have patient call back

## 2021-10-19 NOTE — TELEPHONE ENCOUNTER
Pt wants to know if she needs to get the covid booster shot in addition to getting the flu shot.      Confirmed [phone] as best contact for pt

## 2021-10-21 RX ORDER — PREGABALIN 150 MG/1
CAPSULE ORAL
Qty: 180 CAPSULE | Refills: 0 | Status: SHIPPED | OUTPATIENT
Start: 2021-10-21 | End: 2021-10-25

## 2021-10-21 NOTE — TELEPHONE ENCOUNTER
Name from pharmacy: PREGABALIN 150MG CAPSULES          Will file in chart as: PREGABALIN 150 MG Oral Cap    Sig: TAKE 1 CAPSULE BY MOUTH TWICE DAILY    Disp:  180 capsule    Refills:  0 (Pharmacy requested: Not specified)    Start: 10/20/2021    Class: Nor

## 2021-10-22 ENCOUNTER — IMMUNIZATION (OUTPATIENT)
Dept: INTERNAL MEDICINE CLINIC | Facility: CLINIC | Age: 71
End: 2021-10-22
Payer: MEDICARE

## 2021-10-22 DIAGNOSIS — Z23 NEED FOR VACCINATION: Primary | ICD-10-CM

## 2021-10-22 PROCEDURE — 90662 IIV NO PRSV INCREASED AG IM: CPT | Performed by: FAMILY MEDICINE

## 2021-10-22 PROCEDURE — G0008 ADMIN INFLUENZA VIRUS VAC: HCPCS | Performed by: FAMILY MEDICINE

## 2021-10-25 RX ORDER — PREGABALIN 150 MG/1
CAPSULE ORAL
Qty: 180 CAPSULE | Refills: 0 | Status: SHIPPED | OUTPATIENT
Start: 2021-10-25

## 2021-10-25 RX ORDER — ACETAMINOPHEN AND CODEINE PHOSPHATE 300; 30 MG/1; MG/1
TABLET ORAL
Qty: 50 TABLET | Refills: 0 | Status: SHIPPED | OUTPATIENT
Start: 2021-10-25 | End: 2021-11-30

## 2021-10-25 NOTE — TELEPHONE ENCOUNTER
Name from pharmacy: ACETAMINOPHEN/COD #3 (300/30MG) TAB          Will file in chart as: ACETAMINOPHEN-CODEINE 300-30 MG Oral Tab    Sig: TAKE 1 TABLET BY MOUTH EVERY 4 HOURS AS NEEDED    Disp:  50 tablet    Refills:  0 (Pharmacy requested: Not specified)

## 2021-10-26 ENCOUNTER — TELEPHONE (OUTPATIENT)
Dept: INTERNAL MEDICINE CLINIC | Facility: CLINIC | Age: 71
End: 2021-10-26

## 2021-10-26 RX ORDER — BLOOD-GLUCOSE METER
1 EACH MISCELLANEOUS DAILY
Qty: 1 KIT | Refills: 0 | Status: SHIPPED | OUTPATIENT
Start: 2021-10-26 | End: 2022-10-26

## 2021-10-26 RX ORDER — BLOOD SUGAR DIAGNOSTIC
STRIP MISCELLANEOUS
Qty: 100 STRIP | Refills: 1 | Status: SHIPPED | OUTPATIENT
Start: 2021-10-26 | End: 2022-10-26

## 2021-10-26 RX ORDER — LANCETS
1 EACH MISCELLANEOUS DAILY
Qty: 100 EACH | Refills: 1 | Status: SHIPPED | OUTPATIENT
Start: 2021-10-26 | End: 2022-10-26

## 2021-10-26 NOTE — TELEPHONE ENCOUNTER
Pts pharmacy called stating they received the refill for One Touch glucose supplies, she states the pts insurance will only cover AccuChek or TruMatrix. They would like to know if they can change it to one of those brands and get it signed off.  Please a

## 2021-11-01 RX ORDER — BLOOD SUGAR DIAGNOSTIC
1 STRIP MISCELLANEOUS DAILY
Qty: 100 STRIP | Refills: 0 | Status: SHIPPED | OUTPATIENT
Start: 2021-11-01

## 2021-11-01 RX ORDER — BLOOD-GLUCOSE METER
1 EACH MISCELLANEOUS DAILY
Qty: 1 KIT | Refills: 0 | Status: SHIPPED | OUTPATIENT
Start: 2021-11-01

## 2021-11-01 NOTE — TELEPHONE ENCOUNTER
Accu Chek Luci discontinued    New rx needed for Accu Chek Guide lancets, strips and meter    [facility] #[id number]

## 2021-11-17 DIAGNOSIS — E11.42 TYPE 2 DIABETES MELLITUS WITH DIABETIC POLYNEUROPATHY, WITHOUT LONG-TERM CURRENT USE OF INSULIN (HCC): ICD-10-CM

## 2021-11-17 DIAGNOSIS — K29.90 GASTRITIS AND GASTRODUODENITIS: ICD-10-CM

## 2021-11-17 DIAGNOSIS — K21.9 GASTROESOPHAGEAL REFLUX DISEASE WITHOUT ESOPHAGITIS: ICD-10-CM

## 2021-11-17 DIAGNOSIS — K29.70 GASTRITIS AND GASTRODUODENITIS: ICD-10-CM

## 2021-11-17 RX ORDER — PRAVASTATIN SODIUM 20 MG
20 TABLET ORAL EVERY EVENING
Qty: 30 TABLET | Refills: 0 | Status: SHIPPED | OUTPATIENT
Start: 2021-11-17 | End: 2021-11-17

## 2021-11-17 RX ORDER — SPIRONOLACTONE 25 MG/1
25 TABLET ORAL DAILY
Qty: 90 TABLET | Refills: 0 | Status: SHIPPED | OUTPATIENT
Start: 2021-11-17 | End: 2022-02-15

## 2021-11-17 RX ORDER — PRAVASTATIN SODIUM 20 MG
TABLET ORAL
Qty: 90 TABLET | Refills: 0 | Status: SHIPPED | OUTPATIENT
Start: 2021-11-17

## 2021-11-17 RX ORDER — NORTRIPTYLINE HYDROCHLORIDE 25 MG/1
CAPSULE ORAL
Qty: 90 CAPSULE | Refills: 0 | Status: SHIPPED | OUTPATIENT
Start: 2021-11-17 | End: 2022-02-15

## 2021-11-17 RX ORDER — DICYCLOMINE HYDROCHLORIDE 10 MG/1
10 CAPSULE ORAL NIGHTLY PRN
Qty: 120 CAPSULE | Refills: 0 | Status: SHIPPED | OUTPATIENT
Start: 2021-11-17 | End: 2022-02-15

## 2021-11-17 NOTE — TELEPHONE ENCOUNTER
Name from pharmacy: PRAVASTATIN 20MG TABLETS          Will file in chart as: PRAVASTATIN 20 MG Oral Tab    Sig: TAKE 1 TABLET(20 MG) BY MOUTH EVERY EVENING    Disp:  90 tablet    Refills:  0 (Pharmacy requested: Not specified)    Start: 11/17/2021    Class

## 2021-11-22 ENCOUNTER — NURSE ONLY (OUTPATIENT)
Dept: INTERNAL MEDICINE CLINIC | Facility: CLINIC | Age: 71
End: 2021-11-22
Payer: MEDICARE

## 2021-11-22 ENCOUNTER — TELEPHONE (OUTPATIENT)
Dept: INTERNAL MEDICINE CLINIC | Facility: CLINIC | Age: 71
End: 2021-11-22

## 2021-11-22 PROCEDURE — 96372 THER/PROPH/DIAG INJ SC/IM: CPT | Performed by: FAMILY MEDICINE

## 2021-11-22 RX ORDER — TOPIRAMATE 50 MG/1
TABLET, FILM COATED ORAL
Qty: 180 TABLET | Refills: 1 | Status: SHIPPED | OUTPATIENT
Start: 2021-11-22

## 2021-11-22 RX ORDER — TOPIRAMATE 25 MG/1
TABLET ORAL
Qty: 180 TABLET | Refills: 1 | Status: SHIPPED | OUTPATIENT
Start: 2021-11-22

## 2021-11-22 RX ADMIN — CYANOCOBALAMIN 1000 MCG: 1000 INJECTION INTRAMUSCULAR; SUBCUTANEOUS at 10:10:00

## 2021-11-22 NOTE — TELEPHONE ENCOUNTER
Name from pharmacy: TOPIRAMATE 25MG TABLETS          Will file in chart as: TOPIRAMATE 25 MG Oral Tab    Sig: TAKE 1 TABLET(25 MG) BY MOUTH TWICE DAILY    Disp:  180 tablet    Refills:  1 (Pharmacy requested: Not specified)    Start: 11/21/2021    Class: N

## 2021-11-30 RX ORDER — ACETAMINOPHEN AND CODEINE PHOSPHATE 300; 30 MG/1; MG/1
TABLET ORAL
Qty: 50 TABLET | Refills: 0 | Status: SHIPPED | OUTPATIENT
Start: 2021-11-30 | End: 2022-01-03

## 2021-11-30 NOTE — TELEPHONE ENCOUNTER
Acetaminophen-Codeine 300-30 mg  Filled 10-25-21  Qty 50  0 refills  No upcoming appt.    LOV 8-30-21

## 2021-12-01 ENCOUNTER — HOSPITAL ENCOUNTER (OUTPATIENT)
Dept: MAMMOGRAPHY | Age: 71
Discharge: HOME | End: 2021-12-01
Attending: FAMILY MEDICINE
Payer: MEDICARE

## 2021-12-01 DIAGNOSIS — Z12.31 ENCOUNTER FOR SCREENING MAMMOGRAM FOR MALIGNANT NEOPLASM OF BREAST: ICD-10-CM

## 2021-12-01 PROCEDURE — 77067 SCR MAMMO BI INCL CAD: CPT | Performed by: FAMILY MEDICINE

## 2021-12-01 PROCEDURE — 77063 BREAST TOMOSYNTHESIS BI: CPT | Performed by: FAMILY MEDICINE

## 2022-01-03 RX ORDER — ACETAMINOPHEN AND CODEINE PHOSPHATE 300; 30 MG/1; MG/1
TABLET ORAL
Qty: 50 TABLET | Refills: 0 | Status: SHIPPED | OUTPATIENT
Start: 2022-01-03

## 2022-02-08 ENCOUNTER — OFFICE VISIT (OUTPATIENT)
Dept: INTERNAL MEDICINE CLINIC | Facility: CLINIC | Age: 72
End: 2022-02-08
Payer: MEDICARE

## 2022-02-08 VITALS
DIASTOLIC BLOOD PRESSURE: 64 MMHG | HEART RATE: 74 BPM | RESPIRATION RATE: 18 BRPM | WEIGHT: 237 LBS | TEMPERATURE: 98 F | HEIGHT: 58 IN | SYSTOLIC BLOOD PRESSURE: 118 MMHG | BODY MASS INDEX: 49.75 KG/M2

## 2022-02-08 DIAGNOSIS — R39.9 UTI SYMPTOMS: ICD-10-CM

## 2022-02-08 DIAGNOSIS — R30.0 BURNING WITH URINATION: Primary | ICD-10-CM

## 2022-02-08 DIAGNOSIS — R39.9 URINARY TRACT INFECTION SYMPTOMS: ICD-10-CM

## 2022-02-08 LAB
APPEARANCE: CLEAR
BILIRUBIN: NEGATIVE
GLUCOSE (URINE DIPSTICK): >=1000 MG/DL
KETONES (URINE DIPSTICK): NEGATIVE MG/DL
LEUKOCYTES: NEGATIVE
MULTISTIX EXPIRATION DATE: ABNORMAL DATE
MULTISTIX LOT#: ABNORMAL NUMERIC
NITRITE, URINE: NEGATIVE
OCCULT BLOOD: NEGATIVE
PH, URINE: 6.5 (ref 4.5–8)
PROTEIN (URINE DIPSTICK): NEGATIVE MG/DL
SPECIFIC GRAVITY: 1.02 (ref 1–1.03)
UROBILINOGEN,SEMI-QN: 0.2 MG/DL (ref 0–1.9)

## 2022-02-08 PROCEDURE — 87086 URINE CULTURE/COLONY COUNT: CPT | Performed by: FAMILY MEDICINE

## 2022-02-08 PROCEDURE — 81003 URINALYSIS AUTO W/O SCOPE: CPT | Performed by: FAMILY MEDICINE

## 2022-02-08 PROCEDURE — 99213 OFFICE O/P EST LOW 20 MIN: CPT | Performed by: FAMILY MEDICINE

## 2022-02-08 RX ORDER — NITROFURANTOIN 25; 75 MG/1; MG/1
100 CAPSULE ORAL 2 TIMES DAILY
Qty: 6 CAPSULE | Refills: 0 | Status: SHIPPED | OUTPATIENT
Start: 2022-02-08

## 2022-02-08 RX ORDER — URSODIOL 300 MG/1
600 CAPSULE ORAL 3 TIMES DAILY
COMMUNITY
Start: 2022-02-04

## 2022-02-08 RX ORDER — ACETAMINOPHEN AND CODEINE PHOSPHATE 300; 30 MG/1; MG/1
TABLET ORAL
Qty: 50 TABLET | Refills: 0 | Status: SHIPPED | OUTPATIENT
Start: 2022-02-08 | End: 2022-03-08

## 2022-02-15 RX ORDER — SPIRONOLACTONE 25 MG/1
TABLET ORAL
Qty: 90 TABLET | Refills: 0 | Status: SHIPPED | OUTPATIENT
Start: 2022-02-15

## 2022-02-15 RX ORDER — DICYCLOMINE HYDROCHLORIDE 10 MG/1
CAPSULE ORAL
Qty: 120 CAPSULE | Refills: 0 | Status: SHIPPED | OUTPATIENT
Start: 2022-02-15

## 2022-02-15 RX ORDER — PRAVASTATIN SODIUM 20 MG
TABLET ORAL
Qty: 90 TABLET | Refills: 0 | Status: SHIPPED | OUTPATIENT
Start: 2022-02-15

## 2022-02-15 RX ORDER — NORTRIPTYLINE HYDROCHLORIDE 25 MG/1
CAPSULE ORAL
Qty: 90 CAPSULE | Refills: 0 | Status: SHIPPED | OUTPATIENT
Start: 2022-02-15

## 2022-02-15 RX ORDER — EMPAGLIFLOZIN 10 MG/1
TABLET, FILM COATED ORAL
Qty: 90 TABLET | Refills: 0 | Status: SHIPPED | OUTPATIENT
Start: 2022-02-15

## 2022-02-15 NOTE — TELEPHONE ENCOUNTER
Nortriptyline 25 mg  Filled 11-17-21  Qty 90  0 refill  No upcoming appt. LOV 8-30-21    Dicyclomine 10 mg  Filled 11-17-21  Qty 120  0 refills  No upcoming appt. LOV 8-30-21    Jardiance 10 mg  Filled 11-17-21  Qty 90  0 refills  No upcoming appt. LOV 8-30-21    Metfominr HCl 1000 mg  Filled 11-17-21  Qty 180  0 refills  No upcoming appt. LOV 8-30-21    Pravastatin 20 mg  Filled 11-17-21  Qty 90  0 refills  No upcoming appt. LOV 8-30-21    Spironolactone 25 mg  Filled 11-17-21  Qty 90  0 refills  No upcoming appt.    LOV 8-30-21

## 2022-02-24 RX ORDER — ACETAMINOPHEN 500 MG
1000 TABLET ORAL ONCE
Status: CANCELLED | OUTPATIENT
Start: 2022-02-24 | End: 2022-02-24

## 2022-02-24 RX ORDER — SODIUM CHLORIDE, SODIUM LACTATE, POTASSIUM CHLORIDE, CALCIUM CHLORIDE 600; 310; 30; 20 MG/100ML; MG/100ML; MG/100ML; MG/100ML
INJECTION, SOLUTION INTRAVENOUS CONTINUOUS
Status: CANCELLED | OUTPATIENT
Start: 2022-02-24

## 2022-02-25 ENCOUNTER — TELEPHONE (OUTPATIENT)
Dept: INTERNAL MEDICINE CLINIC | Facility: CLINIC | Age: 72
End: 2022-02-25

## 2022-02-25 NOTE — TELEPHONE ENCOUNTER
Incoming fax from [facility]    Patient is having Colonoscopy Esophagogastroduodenoscopy  On 3/1/2022    When doing pre-op screening it was flagged that patient has DAVID.     Please advise if patient is in need of sleep study prior to procedure    Placed in  in basket for review

## 2022-02-28 ENCOUNTER — LAB ENCOUNTER (OUTPATIENT)
Dept: LAB | Age: 72
End: 2022-02-28
Attending: INTERNAL MEDICINE
Payer: MEDICARE

## 2022-02-28 DIAGNOSIS — K21.9 GASTROESOPHAGEAL REFLUX DISEASE WITHOUT ESOPHAGITIS: ICD-10-CM

## 2022-02-28 NOTE — TELEPHONE ENCOUNTER
No sleep study needed per      Faxed paperwork with confirmation to [phone]    Placed to scan as well as placed in blue accordion folder in Pod 3

## 2022-03-01 LAB — SARS-COV-2 RNA RESP QL NAA+PROBE: NOT DETECTED

## 2022-03-02 ENCOUNTER — ANESTHESIA (OUTPATIENT)
Dept: ENDOSCOPY | Facility: HOSPITAL | Age: 72
End: 2022-03-02
Payer: MEDICARE

## 2022-03-02 ENCOUNTER — HOSPITAL ENCOUNTER (OUTPATIENT)
Facility: HOSPITAL | Age: 72
Setting detail: HOSPITAL OUTPATIENT SURGERY
Discharge: HOME | End: 2022-03-02
Attending: INTERNAL MEDICINE | Admitting: INTERNAL MEDICINE
Payer: MEDICARE

## 2022-03-02 ENCOUNTER — ANESTHESIA EVENT (OUTPATIENT)
Dept: ENDOSCOPY | Facility: HOSPITAL | Age: 72
End: 2022-03-02
Payer: MEDICARE

## 2022-03-02 ENCOUNTER — TELEPHONE (OUTPATIENT)
Dept: INTERNAL MEDICINE CLINIC | Facility: CLINIC | Age: 72
End: 2022-03-02

## 2022-03-02 VITALS
OXYGEN SATURATION: 100 % | SYSTOLIC BLOOD PRESSURE: 121 MMHG | DIASTOLIC BLOOD PRESSURE: 67 MMHG | BODY MASS INDEX: 46.6 KG/M2 | HEIGHT: 58 IN | RESPIRATION RATE: 14 BRPM | TEMPERATURE: 98 F | HEART RATE: 77 BPM | WEIGHT: 222 LBS

## 2022-03-02 DIAGNOSIS — K44.9 HIATAL HERNIA: ICD-10-CM

## 2022-03-02 DIAGNOSIS — K21.9 GASTROESOPHAGEAL REFLUX DISEASE, UNSPECIFIED WHETHER ESOPHAGITIS PRESENT: ICD-10-CM

## 2022-03-02 DIAGNOSIS — K21.9 GASTROESOPHAGEAL REFLUX DISEASE WITHOUT ESOPHAGITIS: Primary | ICD-10-CM

## 2022-03-02 LAB — GLUCOSE BLD-MCNC: 84 MG/DL (ref 70–99)

## 2022-03-02 PROCEDURE — 82962 GLUCOSE BLOOD TEST: CPT

## 2022-03-02 PROCEDURE — 0DB58ZX EXCISION OF ESOPHAGUS, VIA NATURAL OR ARTIFICIAL OPENING ENDOSCOPIC, DIAGNOSTIC: ICD-10-PCS | Performed by: INTERNAL MEDICINE

## 2022-03-02 PROCEDURE — 0DJD8ZZ INSPECTION OF LOWER INTESTINAL TRACT, VIA NATURAL OR ARTIFICIAL OPENING ENDOSCOPIC: ICD-10-PCS | Performed by: INTERNAL MEDICINE

## 2022-03-02 PROCEDURE — 0DB78ZX EXCISION OF STOMACH, PYLORUS, VIA NATURAL OR ARTIFICIAL OPENING ENDOSCOPIC, DIAGNOSTIC: ICD-10-PCS | Performed by: INTERNAL MEDICINE

## 2022-03-02 PROCEDURE — 88305 TISSUE EXAM BY PATHOLOGIST: CPT | Performed by: INTERNAL MEDICINE

## 2022-03-02 PROCEDURE — 0DB28ZX EXCISION OF MIDDLE ESOPHAGUS, VIA NATURAL OR ARTIFICIAL OPENING ENDOSCOPIC, DIAGNOSTIC: ICD-10-PCS | Performed by: INTERNAL MEDICINE

## 2022-03-02 RX ORDER — NICOTINE POLACRILEX 4 MG
30 LOZENGE BUCCAL
Status: DISCONTINUED | OUTPATIENT
Start: 2022-03-02 | End: 2022-03-02

## 2022-03-02 RX ORDER — SODIUM CHLORIDE, SODIUM LACTATE, POTASSIUM CHLORIDE, CALCIUM CHLORIDE 600; 310; 30; 20 MG/100ML; MG/100ML; MG/100ML; MG/100ML
INJECTION, SOLUTION INTRAVENOUS CONTINUOUS
Status: DISCONTINUED | OUTPATIENT
Start: 2022-03-02 | End: 2022-03-02

## 2022-03-02 RX ORDER — NALOXONE HYDROCHLORIDE 0.4 MG/ML
80 INJECTION, SOLUTION INTRAMUSCULAR; INTRAVENOUS; SUBCUTANEOUS AS NEEDED
Status: DISCONTINUED | OUTPATIENT
Start: 2022-03-02 | End: 2022-03-02

## 2022-03-02 RX ORDER — LIDOCAINE HYDROCHLORIDE 10 MG/ML
INJECTION, SOLUTION EPIDURAL; INFILTRATION; INTRACAUDAL; PERINEURAL AS NEEDED
Status: DISCONTINUED | OUTPATIENT
Start: 2022-03-02 | End: 2022-03-02 | Stop reason: SURG

## 2022-03-02 RX ORDER — NICOTINE POLACRILEX 4 MG
15 LOZENGE BUCCAL
Status: DISCONTINUED | OUTPATIENT
Start: 2022-03-02 | End: 2022-03-02

## 2022-03-02 RX ORDER — DEXTROSE MONOHYDRATE 25 G/50ML
50 INJECTION, SOLUTION INTRAVENOUS
Status: DISCONTINUED | OUTPATIENT
Start: 2022-03-02 | End: 2022-03-02

## 2022-03-02 RX ADMIN — SODIUM CHLORIDE, SODIUM LACTATE, POTASSIUM CHLORIDE, CALCIUM CHLORIDE: 600; 310; 30; 20 INJECTION, SOLUTION INTRAVENOUS at 13:33:00

## 2022-03-02 RX ADMIN — SODIUM CHLORIDE, SODIUM LACTATE, POTASSIUM CHLORIDE, CALCIUM CHLORIDE: 600; 310; 30; 20 INJECTION, SOLUTION INTRAVENOUS at 14:32:00

## 2022-03-02 RX ADMIN — LIDOCAINE HYDROCHLORIDE 25 MG: 10 INJECTION, SOLUTION EPIDURAL; INFILTRATION; INTRACAUDAL; PERINEURAL at 13:37:00

## 2022-03-02 NOTE — TELEPHONE ENCOUNTER
Pt called stating she has a colonoscopy today at 11:30, pt would like to know if she can take her daily medications before.     METFORMIN HCL 1000 MG Oral Tab    PRAVASTATIN 20 MG Oral Tab    DICYCLOMINE 10 MG Oral Cap    NORTRIPTYLINE 25 MG Oral Cap    SPIRONOLACTONE 25 MG Oral Tab

## 2022-03-02 NOTE — ANESTHESIA POSTPROCEDURE EVALUATION
[facility] 227 Patient Status:  Hospital Outpatient Surgery   Age/Gender 80year old female MRN [id number]   Location [address] [facility] 28 Attending Lluvia Alvarenga, [facility] Day # 0 PCP Chuck Mims MD       Anesthesia Post-op Note    COLONOSCOPY,    ESOPHAGOGASTRODUODENOSCOPY with biopsies    Procedure Summary     Date: 03/02/22 Room / Location: [facility] ENDOSCOPY 02 / [facility] ENDOSCOPY    Anesthesia Start: 2627 Anesthesia Stop: 3414    Procedures:       COLONOSCOPY,   ESOPHAGOGASTRODUODENOSCOPY with biopsies (N/A )      ESOPHAGOGASTRODUODENOSCOPY (EGD) (N/A ) Diagnosis:       Gastroesophageal reflux disease, unspecified whether esophagitis present      Hiatal hernia      (Hiatal hernia, irregular Z-line, white flecks in esophagus, diverticulosis)    Surgeons: Lluvia Alvarenga DO Anesthesiologist: Pauly Schmitz MD    Anesthesia Type: MAC ASA Status: 3          Anesthesia Type: MAC    Vitals Value Taken Time   /62 03/02/22 1433   Temp  03/02/22 1433   Pulse 80 03/02/22 1433   Resp 16 03/02/22 1433   SpO2 98 03/02/22 1433       Patient Location: Endoscopy    Anesthesia Type: MAC    Airway Patency: patent    Postop Pain Control: adequate    Mental Status: preanesthetic baseline    Nausea/Vomiting: none    Cardiopulmonary/Hydration status: stable euvolemic    Complications: no apparent anesthesia related complications    Postop vital signs: stable    Dental Exam: Unchanged from Preop    Patient to be discharged from PACU when criteria met.

## 2022-03-02 NOTE — TELEPHONE ENCOUNTER
Spoke with patient advised to hold medications for now. Patient verbalized understanding and agreeable to POC.

## 2022-03-02 NOTE — OPERATIVE REPORT
Operative Report-Esophagogastroduodenoscopy with cold biopsies  Israel Santillan 7/30/1950   [facility] [id number] MRN [id number]   Admission Date 3/2/2022 Operation Date 3/2/2022   Attending Physician Marciano Cantu DO Operating Physician Pauly Yates DO     PREOPERATIVE DIAGNOSIS/INDICATION: GERD  POSTOPERTATIVE DIAGNOSIS: Antral erythema, white specks in esophagus, hiatal hernia, irregular SCJ  PROCEDURE PERFORMED: EGD  INFORMED CONSENT: Once a brief history and physical examination was performed, the risks, benefits and alternatives to the procedure were discussed with the patient and/or family and informed consent was obtained. The risks of sedation, perforation, missed lesions and need for surgery were all discussed. Patient expressed understanding of the risks and agreed to proceed. PROCEDURE DESCRIPTION:  The patient was then brought to the endoscopy suite where her pulse, pulse oximetry and blood pressure were monitored. She was placed in the left lateral decubitus position and deep sedation was administered. Once adequate sedation was achieved, a bite block was placed and a lubricated tip of an Olympus video upper endoscope was inserted through the oropharynx and gently manipulated through the esophagus into the stomach and the distal duodenum. Upon withdrawal of the endoscope, careful visualization of the mucosa was performed. FINDINGS:  - ESOPHAGUS: There were white specks in the esophagus.   - EGJ: The GEJ was at 35 cm. The SCJ was at 33 cm and was slightly irregular.   - STOMACH: There was a 2 cm hiatal hernia. There was antral erythema.   - DUODENUM: Normal.   THERAPEUTICS: Biopsies were performed from the antrum/body, SCJ, and mid esophagus with a cold biopsy forceps. RECOMMENDATIONS:   - Post EGD precautions, watch for bleeding, infection, perforation, adverse drug reactions   - Follow biopsies.     CC Report:     Pauly Yates DO  3/2/2022  1:47 PM

## 2022-03-02 NOTE — OPERATIVE REPORT
Operative Report-Colonoscopy    Kasandra Ragland 7/30/1950   [facility] [id number] MRN [id number]   Admission Date 3/2/2022 Operation Date 3/2/2022   Attending Physician Savanna Grace DO Operating Physician Shannon Coto DO     PREOPERATIVE DIAGNOSIS/INDICATION: Screening  POSTOPERTATIVE DIAGNOSIS: Diverticulosis, Redundant colon, Internal hemorrhoids  PROCEDURE PERFORMED: COLONOSCOPY  INFORMED CONSENT:  Once a brief history and physical examination was performed, the risks, benefits and alternatives to the procedure were discussed with the patient and/or family and informed consent was obtained. The risks of sedation, perforation, missed lesions and need for surgery were all discussed. Patient expressed understanding of the risks and agreed to proceed. PROCEDURE DESCRIPTION:The patient was then brought to the endoscopy suite where her pulse, pulse oximetry and blood pressure were monitored. She was placed in the left lateral decubitus position and deep sedation was administered. Once adequate sedation was achieved, a rectal exam was performed which was normal. A lubricated tip of an Olympus video colonoscope was then inserted through the rectum and gently manipulated under direct visualization to the cecal pole. The quality of the preparation was fair. Upon withdrawal of the colonoscope, careful visualization of the mucosa was performed. [place] Prep Score: Right Colon 2 Transverse colon 2 Left colon 2  FINDINGS/THERAPEUTICS:  - TERMINAL ILEUM: Unable to intubate as the colon was tortuous and redundant  - COLON: There was scattered diverticulosis in the left colon. There was scattered vegetable residue, which could not be always be suctioned and much irrigation was required. The cecum was not well seen due to this. The sigmoid was angulated and this required switching from an adult scope to a pediatric scope. Two passes were made to the cecum.   - RECTUM: Internal hemorrhoids.   RECOMMENDATIONS:   - Post Colonoscopy precautions, watch for bleeding, infection, perforation, adverse drug reactions   - Obtain barium enema to further assess cecum  - Repeat colonoscopy in 2 years with a 2 days extended prep and avoid vegetables for 1 week prior.   CC Report:   Pam Mason DO  3/2/2022  2:30 PM

## 2022-03-08 RX ORDER — ACETAMINOPHEN AND CODEINE PHOSPHATE 300; 30 MG/1; MG/1
TABLET ORAL
Qty: 50 TABLET | Refills: 0 | Status: SHIPPED | OUTPATIENT
Start: 2022-03-08

## 2022-03-15 ENCOUNTER — TELEPHONE (OUTPATIENT)
Dept: INTERNAL MEDICINE CLINIC | Facility: CLINIC | Age: 72
End: 2022-03-15

## 2022-03-15 NOTE — TELEPHONE ENCOUNTER
Pt requesting lab orders to be placed prior to Gustavo Almaraz 39.    Future Appointments   Date Time Provider Damon Toya   4/7/2022 10:00 AM Iza Nieves MD EM[facility] 8 [facility] [place]     Please advise

## 2022-03-17 RX ORDER — PREGABALIN 150 MG/1
CAPSULE ORAL
Qty: 180 CAPSULE | Refills: 0 | Status: SHIPPED | OUTPATIENT
Start: 2022-03-17

## 2022-04-07 ENCOUNTER — LAB ENCOUNTER (OUTPATIENT)
Dept: LAB | Age: 72
End: 2022-04-07
Attending: FAMILY MEDICINE
Payer: MEDICARE

## 2022-04-07 ENCOUNTER — OFFICE VISIT (OUTPATIENT)
Dept: INTERNAL MEDICINE CLINIC | Facility: CLINIC | Age: 72
End: 2022-04-07
Payer: MEDICARE

## 2022-04-07 VITALS
WEIGHT: 239.63 LBS | SYSTOLIC BLOOD PRESSURE: 102 MMHG | OXYGEN SATURATION: 97 % | HEART RATE: 76 BPM | BODY MASS INDEX: 50.3 KG/M2 | DIASTOLIC BLOOD PRESSURE: 60 MMHG | TEMPERATURE: 98 F | HEIGHT: 58 IN

## 2022-04-07 DIAGNOSIS — Z00.00 ENCOUNTER FOR ANNUAL HEALTH EXAMINATION: ICD-10-CM

## 2022-04-07 DIAGNOSIS — M96.1 POSTLAMINECTOMY SYNDROME, LUMBAR REGION: ICD-10-CM

## 2022-04-07 DIAGNOSIS — I10 ESSENTIAL HYPERTENSION, BENIGN: ICD-10-CM

## 2022-04-07 DIAGNOSIS — K29.90 GASTRITIS AND GASTRODUODENITIS: ICD-10-CM

## 2022-04-07 DIAGNOSIS — E66.01 MORBID OBESITY DUE TO EXCESS CALORIES (HCC): ICD-10-CM

## 2022-04-07 DIAGNOSIS — E11.42 TYPE 2 DIABETES MELLITUS WITH DIABETIC POLYNEUROPATHY, WITHOUT LONG-TERM CURRENT USE OF INSULIN (HCC): ICD-10-CM

## 2022-04-07 DIAGNOSIS — K21.9 GASTROESOPHAGEAL REFLUX DISEASE WITHOUT ESOPHAGITIS: ICD-10-CM

## 2022-04-07 DIAGNOSIS — E78.00 PURE HYPERCHOLESTEROLEMIA: ICD-10-CM

## 2022-04-07 DIAGNOSIS — I10 ESSENTIAL HYPERTENSION, BENIGN: Primary | ICD-10-CM

## 2022-04-07 DIAGNOSIS — Z00.00 ROUTINE GENERAL MEDICAL EXAMINATION AT A HEALTH CARE FACILITY: ICD-10-CM

## 2022-04-07 DIAGNOSIS — K80.20 ASYMPTOMATIC GALLSTONES: ICD-10-CM

## 2022-04-07 DIAGNOSIS — K29.70 GASTRITIS AND GASTRODUODENITIS: ICD-10-CM

## 2022-04-07 LAB
ALBUMIN SERPL-MCNC: 3.9 G/DL (ref 3.4–5)
ALBUMIN/GLOB SERPL: 1.1 {RATIO} (ref 1–2)
ALP LIVER SERPL-CCNC: 130 U/L
ALT SERPL-CCNC: 12 U/L
ANION GAP SERPL CALC-SCNC: 5 MMOL/L (ref 0–18)
AST SERPL-CCNC: 11 U/L (ref 15–37)
BASOPHILS # BLD AUTO: 0.03 X10(3) UL (ref 0–0.2)
BASOPHILS NFR BLD AUTO: 0.5 %
BILIRUB SERPL-MCNC: 0.2 MG/DL (ref 0.1–2)
BUN BLD-MCNC: 16 MG/DL (ref 7–18)
CALCIUM BLD-MCNC: 9.4 MG/DL (ref 8.5–10.1)
CHLORIDE SERPL-SCNC: 105 MMOL/L (ref 98–112)
CHOLEST SERPL-MCNC: 152 MG/DL (ref ?–200)
CO2 SERPL-SCNC: 29 MMOL/L (ref 21–32)
CREAT BLD-MCNC: 0.61 MG/DL
CREAT UR-SCNC: 84.4 MG/DL
EOSINOPHIL # BLD AUTO: 0.2 X10(3) UL (ref 0–0.7)
EOSINOPHIL NFR BLD AUTO: 3.5 %
ERYTHROCYTE [DISTWIDTH] IN BLOOD BY AUTOMATED COUNT: 15.7 %
EST. AVERAGE GLUCOSE BLD GHB EST-MCNC: 148 MG/DL (ref 68–126)
FASTING PATIENT LIPID ANSWER: YES
FASTING STATUS PATIENT QL REPORTED: YES
GLOBULIN PLAS-MCNC: 3.7 G/DL (ref 2.8–4.4)
GLUCOSE BLD-MCNC: 99 MG/DL (ref 70–99)
HBA1C MFR BLD: 6.8 % (ref ?–5.7)
HCT VFR BLD AUTO: 41.6 %
HDLC SERPL-MCNC: 91 MG/DL (ref 40–59)
HGB BLD-MCNC: 12.5 G/DL
IMM GRANULOCYTES # BLD AUTO: 0.02 X10(3) UL (ref 0–1)
IMM GRANULOCYTES NFR BLD: 0.3 %
LDLC SERPL CALC-MCNC: 49 MG/DL (ref ?–100)
LYMPHOCYTES # BLD AUTO: 2.46 X10(3) UL (ref 1–4)
LYMPHOCYTES NFR BLD AUTO: 42.6 %
MCH RBC QN AUTO: 27.8 PG (ref 26–34)
MCHC RBC AUTO-ENTMCNC: 30 G/DL (ref 31–37)
MCV RBC AUTO: 92.4 FL
MICROALBUMIN UR-MCNC: 0.5 MG/DL
MICROALBUMIN/CREAT 24H UR-RTO: 5.9 UG/MG (ref ?–30)
MONOCYTES # BLD AUTO: 0.57 X10(3) UL (ref 0.1–1)
MONOCYTES NFR BLD AUTO: 9.9 %
NEUTROPHILS # BLD AUTO: 2.5 X10 (3) UL (ref 1.5–7.7)
NEUTROPHILS # BLD AUTO: 2.5 X10(3) UL (ref 1.5–7.7)
NEUTROPHILS NFR BLD AUTO: 43.2 %
NONHDLC SERPL-MCNC: 61 MG/DL (ref ?–130)
OSMOLALITY SERPL CALC.SUM OF ELEC: 289 MOSM/KG (ref 275–295)
PLATELET # BLD AUTO: 240 10(3)UL (ref 150–450)
POTASSIUM SERPL-SCNC: 3.9 MMOL/L (ref 3.5–5.1)
PROT SERPL-MCNC: 7.6 G/DL (ref 6.4–8.2)
RBC # BLD AUTO: 4.5 X10(6)UL
SODIUM SERPL-SCNC: 139 MMOL/L (ref 136–145)
TRIGL SERPL-MCNC: 57 MG/DL (ref 30–149)
TSI SER-ACNC: 1.49 MIU/ML (ref 0.36–3.74)
VLDLC SERPL CALC-MCNC: 8 MG/DL (ref 0–30)
WBC # BLD AUTO: 5.8 X10(3) UL (ref 4–11)

## 2022-04-07 PROCEDURE — 82043 UR ALBUMIN QUANTITATIVE: CPT

## 2022-04-07 PROCEDURE — 83036 HEMOGLOBIN GLYCOSYLATED A1C: CPT

## 2022-04-07 PROCEDURE — 85025 COMPLETE CBC W/AUTO DIFF WBC: CPT

## 2022-04-07 PROCEDURE — 82570 ASSAY OF URINE CREATININE: CPT

## 2022-04-07 PROCEDURE — 99214 OFFICE O/P EST MOD 30 MIN: CPT | Performed by: FAMILY MEDICINE

## 2022-04-07 PROCEDURE — 80061 LIPID PANEL: CPT

## 2022-04-07 PROCEDURE — 80053 COMPREHEN METABOLIC PANEL: CPT

## 2022-04-07 PROCEDURE — 36415 COLL VENOUS BLD VENIPUNCTURE: CPT

## 2022-04-07 PROCEDURE — 84443 ASSAY THYROID STIM HORMONE: CPT

## 2022-04-07 PROCEDURE — G0439 PPPS, SUBSEQ VISIT: HCPCS | Performed by: FAMILY MEDICINE

## 2022-04-07 RX ORDER — URSODIOL 500 MG/1
TABLET, FILM COATED ORAL
COMMUNITY
Start: 2022-03-08

## 2022-04-11 ENCOUNTER — TELEPHONE (OUTPATIENT)
Dept: INTERNAL MEDICINE CLINIC | Facility: CLINIC | Age: 72
End: 2022-04-11

## 2022-04-11 RX ORDER — ACETAMINOPHEN AND CODEINE PHOSPHATE 300; 30 MG/1; MG/1
TABLET ORAL
Qty: 50 TABLET | Refills: 0 | Status: SHIPPED | OUTPATIENT
Start: 2022-04-11

## 2022-04-16 RX ORDER — TOPIRAMATE 50 MG/1
TABLET, FILM COATED ORAL
Qty: 180 TABLET | Refills: 1 | Status: SHIPPED | OUTPATIENT
Start: 2022-04-16

## 2022-05-11 ENCOUNTER — TELEPHONE (OUTPATIENT)
Dept: INTERNAL MEDICINE CLINIC | Facility: CLINIC | Age: 72
End: 2022-05-11

## 2022-05-11 NOTE — TELEPHONE ENCOUNTER
Mynra Walls from [facility] requesting referral on behalf of the pt. [facility]   Dr Karen Hodges   H40.003 - preglaucoma  H99.244 - bilateral cataracts  H35.313 - nonexudative age realted macular degeneration  E11.329 - type 2 diabetes     Pt had appointment on 5/11 for exam. I advised Myrna Walls prior referral for Dr Emmei Antonio was denied by [facility]. Myrna Walls stated that they are in network with pt plan.      Myrna Walls - [phone]

## 2022-05-12 RX ORDER — ACETAMINOPHEN AND CODEINE PHOSPHATE 300; 30 MG/1; MG/1
TABLET ORAL
Qty: 50 TABLET | Refills: 0 | Status: SHIPPED | OUTPATIENT
Start: 2022-05-12

## 2022-05-16 RX ORDER — TOPIRAMATE 25 MG/1
TABLET ORAL
Qty: 180 TABLET | Refills: 1 | Status: SHIPPED | OUTPATIENT
Start: 2022-05-16

## 2022-05-16 RX ORDER — DICYCLOMINE HYDROCHLORIDE 10 MG/1
CAPSULE ORAL
Qty: 120 CAPSULE | Refills: 0 | Status: SHIPPED | OUTPATIENT
Start: 2022-05-16

## 2022-05-16 RX ORDER — EMPAGLIFLOZIN 10 MG/1
TABLET, FILM COATED ORAL
Qty: 90 TABLET | Refills: 0 | Status: SHIPPED | OUTPATIENT
Start: 2022-05-16

## 2022-05-16 RX ORDER — SPIRONOLACTONE 25 MG/1
TABLET ORAL
Qty: 90 TABLET | Refills: 0 | Status: SHIPPED | OUTPATIENT
Start: 2022-05-16

## 2022-05-16 RX ORDER — PRAVASTATIN SODIUM 20 MG
TABLET ORAL
Qty: 90 TABLET | Refills: 0 | Status: SHIPPED | OUTPATIENT
Start: 2022-05-16

## 2022-05-16 RX ORDER — NORTRIPTYLINE HYDROCHLORIDE 25 MG/1
CAPSULE ORAL
Qty: 90 CAPSULE | Refills: 0 | Status: SHIPPED | OUTPATIENT
Start: 2022-05-16

## 2022-05-16 RX ORDER — OMEPRAZOLE 20 MG/1
CAPSULE, DELAYED RELEASE ORAL
Qty: 90 CAPSULE | Refills: 1 | Status: SHIPPED | OUTPATIENT
Start: 2022-05-16

## 2022-05-18 ENCOUNTER — TELEPHONE (OUTPATIENT)
Dept: INTERNAL MEDICINE CLINIC | Facility: CLINIC | Age: 72
End: 2022-05-18

## 2022-05-18 NOTE — TELEPHONE ENCOUNTER
Incoming fax from [facility]     Patients DM eye exam done on 5/18/2022    Mild nonproliferative diabetic retinopathy detected    Epic updated and placed in  in basket for review

## 2022-06-07 ENCOUNTER — TELEPHONE (OUTPATIENT)
Dept: INTERNAL MEDICINE CLINIC | Facility: CLINIC | Age: 72
End: 2022-06-07

## 2022-06-13 RX ORDER — ACETAMINOPHEN AND CODEINE PHOSPHATE 300; 30 MG/1; MG/1
TABLET ORAL
Qty: 50 TABLET | Refills: 0 | Status: SHIPPED | OUTPATIENT
Start: 2022-06-13

## 2022-06-16 ENCOUNTER — TELEPHONE (OUTPATIENT)
Dept: INTERNAL MEDICINE CLINIC | Facility: CLINIC | Age: 72
End: 2022-06-16

## 2022-06-16 NOTE — TELEPHONE ENCOUNTER
Pt dropped off placard form to be completed by Dr. Juanpablo Arreaga. Form placed in Dr. Raine Dial fax folder.

## 2022-06-20 NOTE — TELEPHONE ENCOUNTER
Paperwork completed    Patient made aware that paperwork is at  in accordion folder for     Placed to scan as well as placed in accordion folder in Pod 3

## 2022-07-14 ENCOUNTER — TELEPHONE (OUTPATIENT)
Dept: INTERNAL MEDICINE CLINIC | Facility: CLINIC | Age: 72
End: 2022-07-14

## 2022-07-14 NOTE — TELEPHONE ENCOUNTER
Reached patient for medication adherence consult. Per insurance report, patient is past due for refill on pravastatin. Patient tells me she is still taking the pravastatin and did have the medication refilled in May - confirmed with date on medication bottle. Patient states she got off-track on a couple of meds at one time, however she does still have medication and is taking it as prescribed. Did provide education and stressed the importance of taking medication just like prescribed to get the most benefit. Patient denies forgetting or missing doses; denies any questions or concerns with medications at this time.

## 2022-07-15 RX ORDER — ACETAMINOPHEN AND CODEINE PHOSPHATE 300; 30 MG/1; MG/1
TABLET ORAL
Qty: 50 TABLET | Refills: 0 | Status: SHIPPED | OUTPATIENT
Start: 2022-07-15

## 2022-08-14 DIAGNOSIS — E11.42 TYPE 2 DIABETES MELLITUS WITH DIABETIC POLYNEUROPATHY, WITHOUT LONG-TERM CURRENT USE OF INSULIN (HCC): ICD-10-CM

## 2022-08-14 RX ORDER — PREGABALIN 150 MG/1
CAPSULE ORAL
Qty: 180 CAPSULE | Refills: 0 | Status: SHIPPED | OUTPATIENT
Start: 2022-08-14 | End: 2022-11-18

## 2022-08-14 RX ORDER — ACETAMINOPHEN AND CODEINE PHOSPHATE 300; 30 MG/1; MG/1
TABLET ORAL
Qty: 50 TABLET | Refills: 0 | Status: SHIPPED | OUTPATIENT
Start: 2022-08-14 | End: 2022-09-14

## 2022-08-14 RX ORDER — SPIRONOLACTONE 25 MG/1
TABLET ORAL
Qty: 90 TABLET | Refills: 0 | Status: SHIPPED | OUTPATIENT
Start: 2022-08-14 | End: 2022-11-09

## 2022-08-14 RX ORDER — NORTRIPTYLINE HYDROCHLORIDE 25 MG/1
CAPSULE ORAL
Qty: 90 CAPSULE | Refills: 0 | Status: SHIPPED | OUTPATIENT
Start: 2022-08-14 | End: 2022-11-10

## 2022-09-14 RX ORDER — ACETAMINOPHEN AND CODEINE PHOSPHATE 300; 30 MG/1; MG/1
TABLET ORAL
Qty: 50 TABLET | Refills: 0 | Status: SHIPPED | OUTPATIENT
Start: 2022-09-14

## 2022-09-14 NOTE — TELEPHONE ENCOUNTER
Acetaminophen-Codeine 300-30 mg  filled 8-14-22  Qty 50  0 refills  Upcoming appt.  10-6-22 TO  LOV 4-7-22 TO

## 2022-10-06 ENCOUNTER — LAB ENCOUNTER (OUTPATIENT)
Dept: LAB | Age: 72
End: 2022-10-06
Attending: FAMILY MEDICINE
Payer: MEDICARE

## 2022-10-06 ENCOUNTER — OFFICE VISIT (OUTPATIENT)
Dept: INTERNAL MEDICINE CLINIC | Facility: CLINIC | Age: 72
End: 2022-10-06
Payer: MEDICARE

## 2022-10-06 VITALS
BODY MASS INDEX: 52.02 KG/M2 | HEIGHT: 58 IN | SYSTOLIC BLOOD PRESSURE: 106 MMHG | WEIGHT: 247.81 LBS | TEMPERATURE: 98 F | OXYGEN SATURATION: 98 % | DIASTOLIC BLOOD PRESSURE: 70 MMHG | HEART RATE: 86 BPM

## 2022-10-06 DIAGNOSIS — Z12.31 VISIT FOR SCREENING MAMMOGRAM: ICD-10-CM

## 2022-10-06 DIAGNOSIS — E11.42 TYPE 2 DIABETES MELLITUS WITH DIABETIC POLYNEUROPATHY, WITHOUT LONG-TERM CURRENT USE OF INSULIN (HCC): ICD-10-CM

## 2022-10-06 DIAGNOSIS — E66.01 MORBID OBESITY DUE TO EXCESS CALORIES (HCC): ICD-10-CM

## 2022-10-06 DIAGNOSIS — E78.00 PURE HYPERCHOLESTEROLEMIA: ICD-10-CM

## 2022-10-06 DIAGNOSIS — I10 ESSENTIAL HYPERTENSION, BENIGN: Primary | ICD-10-CM

## 2022-10-06 DIAGNOSIS — K76.0 NAFLD (NONALCOHOLIC FATTY LIVER DISEASE): ICD-10-CM

## 2022-10-06 LAB
ALBUMIN SERPL-MCNC: 3.8 G/DL (ref 3.4–5)
ALBUMIN/GLOB SERPL: 1 {RATIO} (ref 1–2)
ALP LIVER SERPL-CCNC: 125 U/L
ALT SERPL-CCNC: 13 U/L
ANION GAP SERPL CALC-SCNC: 9 MMOL/L (ref 0–18)
AST SERPL-CCNC: 12 U/L (ref 15–37)
BILIRUB SERPL-MCNC: 0.4 MG/DL (ref 0.1–2)
BUN BLD-MCNC: 12 MG/DL (ref 7–18)
CALCIUM BLD-MCNC: 9.8 MG/DL (ref 8.5–10.1)
CHLORIDE SERPL-SCNC: 107 MMOL/L (ref 98–112)
CO2 SERPL-SCNC: 24 MMOL/L (ref 21–32)
CREAT BLD-MCNC: 0.9 MG/DL
EST. AVERAGE GLUCOSE BLD GHB EST-MCNC: 146 MG/DL (ref 68–126)
FASTING STATUS PATIENT QL REPORTED: YES
GFR SERPLBLD BASED ON 1.73 SQ M-ARVRAT: 68 ML/MIN/1.73M2 (ref 60–?)
GLOBULIN PLAS-MCNC: 4 G/DL (ref 2.8–4.4)
GLUCOSE BLD-MCNC: 103 MG/DL (ref 70–99)
HBA1C MFR BLD: 6.7 % (ref ?–5.7)
OSMOLALITY SERPL CALC.SUM OF ELEC: 290 MOSM/KG (ref 275–295)
POTASSIUM SERPL-SCNC: 3.9 MMOL/L (ref 3.5–5.1)
PROT SERPL-MCNC: 7.8 G/DL (ref 6.4–8.2)
SODIUM SERPL-SCNC: 140 MMOL/L (ref 136–145)

## 2022-10-06 PROCEDURE — 80053 COMPREHEN METABOLIC PANEL: CPT

## 2022-10-06 PROCEDURE — 83036 HEMOGLOBIN GLYCOSYLATED A1C: CPT

## 2022-10-06 PROCEDURE — 36415 COLL VENOUS BLD VENIPUNCTURE: CPT

## 2022-10-06 PROCEDURE — 99214 OFFICE O/P EST MOD 30 MIN: CPT | Performed by: FAMILY MEDICINE

## 2022-10-06 PROCEDURE — G0008 ADMIN INFLUENZA VIRUS VAC: HCPCS | Performed by: FAMILY MEDICINE

## 2022-10-06 PROCEDURE — 90662 IIV NO PRSV INCREASED AG IM: CPT | Performed by: FAMILY MEDICINE

## 2022-10-15 DIAGNOSIS — K29.70 GASTRITIS AND GASTRODUODENITIS: ICD-10-CM

## 2022-10-15 DIAGNOSIS — K21.9 GASTROESOPHAGEAL REFLUX DISEASE WITHOUT ESOPHAGITIS: ICD-10-CM

## 2022-10-15 DIAGNOSIS — K29.90 GASTRITIS AND GASTRODUODENITIS: ICD-10-CM

## 2022-10-17 RX ORDER — DICYCLOMINE HYDROCHLORIDE 10 MG/1
CAPSULE ORAL
Qty: 120 CAPSULE | Refills: 0 | Status: SHIPPED | OUTPATIENT
Start: 2022-10-17

## 2022-10-17 RX ORDER — ACETAMINOPHEN AND CODEINE PHOSPHATE 300; 30 MG/1; MG/1
TABLET ORAL
Qty: 50 TABLET | Refills: 0 | Status: SHIPPED | OUTPATIENT
Start: 2022-10-17

## 2022-10-17 NOTE — TELEPHONE ENCOUNTER
Acetaminophen-codeine 300-30 mg  Filled 9-14-22  Qty 50  0 refills  Upcoming appt.  11-9-22 TO  LOV 10-6-22 TO

## 2022-10-20 ENCOUNTER — TELEPHONE (OUTPATIENT)
Dept: INTERNAL MEDICINE CLINIC | Facility: CLINIC | Age: 72
End: 2022-10-20

## 2022-10-26 ENCOUNTER — TELEPHONE (OUTPATIENT)
Dept: INTERNAL MEDICINE CLINIC | Facility: CLINIC | Age: 72
End: 2022-10-26

## 2022-10-26 NOTE — TELEPHONE ENCOUNTER
Patient called because she states she received a call from [address] [place] [address] that they need records from gastroenterology and a referral.     Informed patient that [address] [place] [address] documented that they sent in records on 10/04/2022 and there is an authorized referral on file for Dr. Mary Templeton that was entered 09/12/2022.

## 2022-11-03 PROBLEM — H40.003 PREGLAUCOMA, UNSPECIFIED, BILATERAL: Status: ACTIVE | Noted: 2022-11-03

## 2022-11-03 PROBLEM — H25.9 AGE-RELATED CATARACT OF BOTH EYES: Status: ACTIVE | Noted: 2022-11-03

## 2022-11-03 PROBLEM — E11.3299 MILD NONPROLIFERATIVE DIABETIC RETINOPATHY ASSOCIATED WITH TYPE 2 DIABETES MELLITUS (HCC): Status: ACTIVE | Noted: 2022-11-03

## 2022-11-03 PROBLEM — H35.3190 NONEXUDATIVE AGE-RELATED MACULAR DEGENERATION: Status: ACTIVE | Noted: 2022-11-03

## 2022-11-03 PROBLEM — H47.239: Status: ACTIVE | Noted: 2022-11-03

## 2022-11-03 PROBLEM — H53.8 BLURRING OF VISUAL IMAGE: Status: ACTIVE | Noted: 2022-11-03

## 2022-11-09 DIAGNOSIS — E11.42 TYPE 2 DIABETES MELLITUS WITH DIABETIC POLYNEUROPATHY, WITHOUT LONG-TERM CURRENT USE OF INSULIN (HCC): ICD-10-CM

## 2022-11-09 RX ORDER — OMEPRAZOLE 20 MG/1
CAPSULE, DELAYED RELEASE ORAL
Qty: 90 CAPSULE | Refills: 0 | OUTPATIENT
Start: 2022-11-09

## 2022-11-09 RX ORDER — SPIRONOLACTONE 25 MG/1
TABLET ORAL
Qty: 90 TABLET | Refills: 1 | Status: SHIPPED | OUTPATIENT
Start: 2022-11-09

## 2022-11-09 RX ORDER — EMPAGLIFLOZIN 10 MG/1
TABLET, FILM COATED ORAL
Qty: 90 TABLET | Refills: 1 | Status: SHIPPED | OUTPATIENT
Start: 2022-11-09

## 2022-11-10 RX ORDER — TOPIRAMATE 25 MG/1
TABLET ORAL
Qty: 180 TABLET | Refills: 1 | Status: SHIPPED | OUTPATIENT
Start: 2022-11-10

## 2022-11-10 RX ORDER — TOPIRAMATE 50 MG/1
TABLET, FILM COATED ORAL
Qty: 180 TABLET | Refills: 1 | Status: SHIPPED | OUTPATIENT
Start: 2022-11-10

## 2022-11-10 RX ORDER — NORTRIPTYLINE HYDROCHLORIDE 25 MG/1
CAPSULE ORAL
Qty: 90 CAPSULE | Refills: 0 | Status: SHIPPED | OUTPATIENT
Start: 2022-11-10

## 2022-11-14 RX ORDER — PRAVASTATIN SODIUM 20 MG
TABLET ORAL
Qty: 90 TABLET | Refills: 1 | Status: SHIPPED | OUTPATIENT
Start: 2022-11-14

## 2022-11-14 NOTE — TELEPHONE ENCOUNTER
Pravastatin 20 mg  Filled 5-16-22  Qty 90  0 refills  No upcoming appt.   LOV 10-6-22 TO  Labs: 4-7-22 Lipid

## 2022-11-18 RX ORDER — PREGABALIN 150 MG/1
CAPSULE ORAL
Qty: 180 CAPSULE | Refills: 0 | Status: SHIPPED | OUTPATIENT
Start: 2022-11-18

## 2022-11-18 RX ORDER — ACETAMINOPHEN AND CODEINE PHOSPHATE 300; 30 MG/1; MG/1
TABLET ORAL
Qty: 50 TABLET | Refills: 0 | Status: SHIPPED | OUTPATIENT
Start: 2022-11-18

## 2022-12-02 ENCOUNTER — HOSPITAL ENCOUNTER (OUTPATIENT)
Dept: MAMMOGRAPHY | Age: 72
Discharge: HOME | End: 2022-12-02
Attending: FAMILY MEDICINE
Payer: MEDICARE

## 2022-12-02 DIAGNOSIS — Z12.31 VISIT FOR SCREENING MAMMOGRAM: ICD-10-CM

## 2022-12-02 PROCEDURE — 77063 BREAST TOMOSYNTHESIS BI: CPT | Performed by: FAMILY MEDICINE

## 2022-12-02 PROCEDURE — 77067 SCR MAMMO BI INCL CAD: CPT | Performed by: FAMILY MEDICINE

## 2022-12-20 RX ORDER — ACETAMINOPHEN AND CODEINE PHOSPHATE 300; 30 MG/1; MG/1
TABLET ORAL
Qty: 50 TABLET | Refills: 0 | Status: SHIPPED | OUTPATIENT
Start: 2022-12-20

## 2022-12-20 NOTE — TELEPHONE ENCOUNTER
Acetaminophen-codeine 300-30 mg  Filled 11-18-22  Qty 50  0 refills  No upcoming appt.   LOV 10-6-22 TO

## 2023-01-23 ENCOUNTER — HOSPITAL ENCOUNTER (OUTPATIENT)
Dept: GENERAL RADIOLOGY | Facility: HOSPITAL | Age: 73
Discharge: HOME | End: 2023-01-23
Attending: NURSE PRACTITIONER
Payer: MEDICARE

## 2023-01-23 DIAGNOSIS — Q43.8 REDUNDANT COLON: ICD-10-CM

## 2023-01-23 PROCEDURE — 74280 X-RAY XM COLON 2CNTRST STD: CPT | Performed by: NURSE PRACTITIONER

## 2023-01-23 RX ORDER — ACETAMINOPHEN AND CODEINE PHOSPHATE 300; 30 MG/1; MG/1
TABLET ORAL
Qty: 50 TABLET | Refills: 0 | Status: SHIPPED | OUTPATIENT
Start: 2023-01-23

## 2023-02-07 RX ORDER — NORTRIPTYLINE HYDROCHLORIDE 25 MG/1
CAPSULE ORAL
Qty: 90 CAPSULE | Refills: 0 | Status: SHIPPED | OUTPATIENT
Start: 2023-02-07

## 2023-02-09 ENCOUNTER — OFFICE VISIT (OUTPATIENT)
Dept: INTERNAL MEDICINE CLINIC | Facility: CLINIC | Age: 73
End: 2023-02-09
Payer: MEDICARE

## 2023-02-09 ENCOUNTER — LAB ENCOUNTER (OUTPATIENT)
Dept: LAB | Age: 73
End: 2023-02-09
Attending: FAMILY MEDICINE
Payer: MEDICARE

## 2023-02-09 VITALS
SYSTOLIC BLOOD PRESSURE: 122 MMHG | BODY MASS INDEX: 50.63 KG/M2 | HEIGHT: 58 IN | WEIGHT: 241.19 LBS | HEART RATE: 85 BPM | OXYGEN SATURATION: 99 % | TEMPERATURE: 98 F | DIASTOLIC BLOOD PRESSURE: 74 MMHG

## 2023-02-09 DIAGNOSIS — E66.01 MORBID OBESITY DUE TO EXCESS CALORIES (HCC): ICD-10-CM

## 2023-02-09 DIAGNOSIS — F43.9 STRESS AT HOME: ICD-10-CM

## 2023-02-09 DIAGNOSIS — I10 ESSENTIAL HYPERTENSION, BENIGN: Primary | ICD-10-CM

## 2023-02-09 DIAGNOSIS — B35.1 ONYCHOMYCOSIS: ICD-10-CM

## 2023-02-09 DIAGNOSIS — E11.42 TYPE 2 DIABETES MELLITUS WITH DIABETIC POLYNEUROPATHY, WITHOUT LONG-TERM CURRENT USE OF INSULIN (HCC): ICD-10-CM

## 2023-02-09 DIAGNOSIS — E78.00 PURE HYPERCHOLESTEROLEMIA: ICD-10-CM

## 2023-02-09 DIAGNOSIS — I10 ESSENTIAL HYPERTENSION, BENIGN: ICD-10-CM

## 2023-02-09 LAB
ALBUMIN SERPL-MCNC: 4.1 G/DL (ref 3.4–5)
ALBUMIN/GLOB SERPL: 1 {RATIO} (ref 1–2)
ALP LIVER SERPL-CCNC: 118 U/L
ALT SERPL-CCNC: 15 U/L
ANION GAP SERPL CALC-SCNC: 6 MMOL/L (ref 0–18)
AST SERPL-CCNC: 16 U/L (ref 15–37)
BASOPHILS # BLD AUTO: 0.03 X10(3) UL (ref 0–0.2)
BASOPHILS NFR BLD AUTO: 0.5 %
BILIRUB SERPL-MCNC: 0.4 MG/DL (ref 0.1–2)
BUN BLD-MCNC: 16 MG/DL (ref 7–18)
CALCIUM BLD-MCNC: 9.9 MG/DL (ref 8.5–10.1)
CHLORIDE SERPL-SCNC: 104 MMOL/L (ref 98–112)
CHOLEST SERPL-MCNC: 159 MG/DL (ref ?–200)
CO2 SERPL-SCNC: 29 MMOL/L (ref 21–32)
CREAT BLD-MCNC: 0.86 MG/DL
CREAT UR-SCNC: 79.2 MG/DL
EOSINOPHIL # BLD AUTO: 0.13 X10(3) UL (ref 0–0.7)
EOSINOPHIL NFR BLD AUTO: 2.2 %
ERYTHROCYTE [DISTWIDTH] IN BLOOD BY AUTOMATED COUNT: 15.8 %
EST. AVERAGE GLUCOSE BLD GHB EST-MCNC: 146 MG/DL (ref 68–126)
FASTING PATIENT LIPID ANSWER: YES
FASTING STATUS PATIENT QL REPORTED: YES
GFR SERPLBLD BASED ON 1.73 SQ M-ARVRAT: 72 ML/MIN/1.73M2 (ref 60–?)
GLOBULIN PLAS-MCNC: 4.2 G/DL (ref 2.8–4.4)
GLUCOSE BLD-MCNC: 120 MG/DL (ref 70–99)
HBA1C MFR BLD: 6.7 % (ref ?–5.7)
HCT VFR BLD AUTO: 41.4 %
HDLC SERPL-MCNC: 93 MG/DL (ref 40–59)
HGB BLD-MCNC: 12.7 G/DL
IMM GRANULOCYTES # BLD AUTO: 0.01 X10(3) UL (ref 0–1)
IMM GRANULOCYTES NFR BLD: 0.2 %
LDLC SERPL CALC-MCNC: 56 MG/DL (ref ?–100)
LYMPHOCYTES # BLD AUTO: 2.46 X10(3) UL (ref 1–4)
LYMPHOCYTES NFR BLD AUTO: 42.4 %
MCH RBC QN AUTO: 28.7 PG (ref 26–34)
MCHC RBC AUTO-ENTMCNC: 30.7 G/DL (ref 31–37)
MCV RBC AUTO: 93.5 FL
MICROALBUMIN UR-MCNC: 0.58 MG/DL
MICROALBUMIN/CREAT 24H UR-RTO: 7.3 UG/MG (ref ?–30)
MONOCYTES # BLD AUTO: 0.43 X10(3) UL (ref 0.1–1)
MONOCYTES NFR BLD AUTO: 7.4 %
NEUTROPHILS # BLD AUTO: 2.74 X10 (3) UL (ref 1.5–7.7)
NEUTROPHILS # BLD AUTO: 2.74 X10(3) UL (ref 1.5–7.7)
NEUTROPHILS NFR BLD AUTO: 47.3 %
NONHDLC SERPL-MCNC: 66 MG/DL (ref ?–130)
OSMOLALITY SERPL CALC.SUM OF ELEC: 290 MOSM/KG (ref 275–295)
PLATELET # BLD AUTO: 255 10(3)UL (ref 150–450)
POTASSIUM SERPL-SCNC: 4.4 MMOL/L (ref 3.5–5.1)
PROT SERPL-MCNC: 8.3 G/DL (ref 6.4–8.2)
RBC # BLD AUTO: 4.43 X10(6)UL
SODIUM SERPL-SCNC: 139 MMOL/L (ref 136–145)
TRIGL SERPL-MCNC: 42 MG/DL (ref 30–149)
VLDLC SERPL CALC-MCNC: 6 MG/DL (ref 0–30)
WBC # BLD AUTO: 5.8 X10(3) UL (ref 4–11)

## 2023-02-09 PROCEDURE — 36415 COLL VENOUS BLD VENIPUNCTURE: CPT

## 2023-02-09 PROCEDURE — 80053 COMPREHEN METABOLIC PANEL: CPT

## 2023-02-09 PROCEDURE — 85025 COMPLETE CBC W/AUTO DIFF WBC: CPT

## 2023-02-09 PROCEDURE — 82043 UR ALBUMIN QUANTITATIVE: CPT

## 2023-02-09 PROCEDURE — 82570 ASSAY OF URINE CREATININE: CPT

## 2023-02-09 PROCEDURE — 99214 OFFICE O/P EST MOD 30 MIN: CPT | Performed by: FAMILY MEDICINE

## 2023-02-09 PROCEDURE — 83036 HEMOGLOBIN GLYCOSYLATED A1C: CPT

## 2023-02-09 PROCEDURE — 80061 LIPID PANEL: CPT

## 2023-02-09 RX ORDER — POLYETHYLENE GLYCOL-3350 AND ELECTROLYTES 236; 6.74; 5.86; 2.97; 22.74 G/274.31G; G/274.31G; G/274.31G; G/274.31G; G/274.31G
POWDER, FOR SOLUTION ORAL
COMMUNITY
Start: 2023-01-18 | End: 2023-02-09 | Stop reason: ALTCHOICE

## 2023-02-26 RX ORDER — ACETAMINOPHEN AND CODEINE PHOSPHATE 300; 30 MG/1; MG/1
TABLET ORAL
Qty: 50 TABLET | Refills: 0 | Status: SHIPPED | OUTPATIENT
Start: 2023-02-26

## 2023-02-26 RX ORDER — PREGABALIN 150 MG/1
CAPSULE ORAL
Qty: 180 CAPSULE | Refills: 0 | Status: SHIPPED | OUTPATIENT
Start: 2023-02-26

## 2023-03-06 ENCOUNTER — TELEPHONE (OUTPATIENT)
Dept: INTERNAL MEDICINE CLINIC | Facility: CLINIC | Age: 73
End: 2023-03-06

## 2023-03-06 DIAGNOSIS — K21.9 GASTROESOPHAGEAL REFLUX DISEASE WITHOUT ESOPHAGITIS: ICD-10-CM

## 2023-03-06 DIAGNOSIS — K29.70 GASTRITIS AND GASTRODUODENITIS: ICD-10-CM

## 2023-03-06 DIAGNOSIS — K29.90 GASTRITIS AND GASTRODUODENITIS: ICD-10-CM

## 2023-03-06 RX ORDER — DICYCLOMINE HYDROCHLORIDE 10 MG/1
CAPSULE ORAL
Qty: 120 CAPSULE | Refills: 0 | Status: SHIPPED | OUTPATIENT
Start: 2023-03-06

## 2023-03-06 NOTE — TELEPHONE ENCOUNTER
Patient seen at [facility] 3/5/23 - follow up scheduled.   Records requested from [address] [place] [address]   Date Time Provider Damon Pittman   3/10/2023 11:00 AM Tonia Landin MD EM[facility] 8 [facility] [place]

## 2023-03-07 LAB
ALBUMIN: 4.2 G/DL
ALKALINE PHOSPHATASE: 100
ALT: 9
ANION GAP: 7 MMOL/L (ref ?–16)
AST: 20
BASO(ABSOLUTE): 0.04
BASOPHILS, %: 1
BILIRUBIN, TOTAL: 0.3 MG/DL
BUN: 18 MG/DL (ref 7–22)
CALCIUM: 9
CARBON DIOXIDE: 26
CHLORIDE: 104
CREATININE: 0.71 MG/DL
EOS (ABSOLUTE): 0.14 X10E3/UL
EOSINOPHIL %: 2
GFR (CKD-EPI)CORRECTED: 91
GLUCOSE: 108
HCT: 39.8
HGB: 11.9
LIPASE: 16
LYMPHOCYTE %: 27
LYMPHOCYTE ABSOLUTE: 1.53
MEAN CELL VOLUME: 94.3
MEAN CORPUSCULAR HEMOGLOBIN: 28.2
MEAN CORPUSCULAR HGB CONC: 29.9
MONOCYTE %: 8
MONOCYTES(ABSOLUTE): 0.43
NEUTROPHIL ABSOLUTE: 3.6
NEUTROPHILS: 62 %
NRBC#: 0
NRBC%: 0
PLT: 248
POTASSIUM: 4.9
RED BLOOD COUNT: 4.22
RED CELL DISTRIBUTION WIDTH: 15.5
SODIUM: 137
TOTAL PROTEIN: 7.3
WBC: 5.8

## 2023-03-10 ENCOUNTER — OFFICE VISIT (OUTPATIENT)
Dept: INTERNAL MEDICINE CLINIC | Facility: CLINIC | Age: 73
End: 2023-03-10
Payer: MEDICARE

## 2023-03-10 VITALS
TEMPERATURE: 98 F | OXYGEN SATURATION: 99 % | HEART RATE: 88 BPM | SYSTOLIC BLOOD PRESSURE: 110 MMHG | BODY MASS INDEX: 49.58 KG/M2 | HEIGHT: 58 IN | WEIGHT: 236.19 LBS | DIASTOLIC BLOOD PRESSURE: 60 MMHG

## 2023-03-10 DIAGNOSIS — K57.92 DIVERTICULITIS: Primary | ICD-10-CM

## 2023-03-10 DIAGNOSIS — E11.42 TYPE 2 DIABETES MELLITUS WITH DIABETIC POLYNEUROPATHY, WITHOUT LONG-TERM CURRENT USE OF INSULIN (HCC): ICD-10-CM

## 2023-03-10 PROCEDURE — 1111F DSCHRG MED/CURRENT MED MERGE: CPT | Performed by: FAMILY MEDICINE

## 2023-03-10 PROCEDURE — 99213 OFFICE O/P EST LOW 20 MIN: CPT | Performed by: FAMILY MEDICINE

## 2023-03-10 RX ORDER — ONDANSETRON 8 MG/1
8 TABLET, ORALLY DISINTEGRATING ORAL 3 TIMES DAILY PRN
COMMUNITY
Start: 2023-03-06

## 2023-03-10 RX ORDER — AMOXICILLIN AND CLAVULANATE POTASSIUM 500; 125 MG/1; MG/1
1 TABLET, FILM COATED ORAL EVERY 8 HOURS
COMMUNITY
Start: 2023-03-06

## 2023-03-26 RX ORDER — ACETAMINOPHEN AND CODEINE PHOSPHATE 300; 30 MG/1; MG/1
TABLET ORAL
Qty: 50 TABLET | Refills: 0 | Status: SHIPPED | OUTPATIENT
Start: 2023-03-26

## 2023-04-06 ENCOUNTER — OFFICE VISIT (OUTPATIENT)
Dept: INTERNAL MEDICINE CLINIC | Facility: CLINIC | Age: 73
End: 2023-04-06
Payer: MEDICARE

## 2023-04-06 ENCOUNTER — TELEPHONE (OUTPATIENT)
Dept: INTERNAL MEDICINE CLINIC | Facility: CLINIC | Age: 73
End: 2023-04-06

## 2023-04-06 VITALS
BODY MASS INDEX: 50.72 KG/M2 | WEIGHT: 241.63 LBS | SYSTOLIC BLOOD PRESSURE: 124 MMHG | HEART RATE: 84 BPM | HEIGHT: 58 IN | OXYGEN SATURATION: 96 % | DIASTOLIC BLOOD PRESSURE: 80 MMHG | TEMPERATURE: 98 F

## 2023-04-06 DIAGNOSIS — R07.89 CHEST PRESSURE: ICD-10-CM

## 2023-04-06 DIAGNOSIS — Z00.00 ENCOUNTER FOR ANNUAL HEALTH EXAMINATION: ICD-10-CM

## 2023-04-06 DIAGNOSIS — K80.20 ASYMPTOMATIC GALLSTONES: ICD-10-CM

## 2023-04-06 DIAGNOSIS — K21.9 GASTROESOPHAGEAL REFLUX DISEASE WITHOUT ESOPHAGITIS: ICD-10-CM

## 2023-04-06 DIAGNOSIS — E11.42 TYPE 2 DIABETES MELLITUS WITH DIABETIC POLYNEUROPATHY, WITHOUT LONG-TERM CURRENT USE OF INSULIN (HCC): ICD-10-CM

## 2023-04-06 DIAGNOSIS — E78.00 PURE HYPERCHOLESTEROLEMIA: ICD-10-CM

## 2023-04-06 DIAGNOSIS — I10 ESSENTIAL HYPERTENSION, BENIGN: Primary | ICD-10-CM

## 2023-04-06 DIAGNOSIS — H40.003 PREGLAUCOMA, UNSPECIFIED, BILATERAL: ICD-10-CM

## 2023-04-06 DIAGNOSIS — K29.90 GASTRITIS AND GASTRODUODENITIS: ICD-10-CM

## 2023-04-06 DIAGNOSIS — E11.3299 MILD NONPROLIFERATIVE DIABETIC RETINOPATHY ASSOCIATED WITH TYPE 2 DIABETES MELLITUS, MACULAR EDEMA PRESENCE UNSPECIFIED, UNSPECIFIED LATERALITY (HCC): ICD-10-CM

## 2023-04-06 DIAGNOSIS — K76.0 NAFLD (NONALCOHOLIC FATTY LIVER DISEASE): ICD-10-CM

## 2023-04-06 DIAGNOSIS — H35.3190 NONEXUDATIVE AGE-RELATED MACULAR DEGENERATION, UNSPECIFIED LATERALITY, UNSPECIFIED STAGE: ICD-10-CM

## 2023-04-06 DIAGNOSIS — M96.1 POSTLAMINECTOMY SYNDROME, LUMBAR REGION: ICD-10-CM

## 2023-04-06 DIAGNOSIS — H47.239 GLAUCOMATOUS ATROPHY OF OPTIC DISC, UNSPECIFIED LATERALITY: ICD-10-CM

## 2023-04-06 DIAGNOSIS — K29.70 GASTRITIS AND GASTRODUODENITIS: ICD-10-CM

## 2023-04-06 DIAGNOSIS — H25.9 AGE-RELATED CATARACT OF BOTH EYES, UNSPECIFIED AGE-RELATED CATARACT TYPE: ICD-10-CM

## 2023-04-06 DIAGNOSIS — E66.01 MORBID OBESITY DUE TO EXCESS CALORIES (HCC): ICD-10-CM

## 2023-04-06 PROBLEM — H53.8 BLURRING OF VISUAL IMAGE: Status: RESOLVED | Noted: 2022-11-03 | Resolved: 2023-04-06

## 2023-04-06 PROCEDURE — G0439 PPPS, SUBSEQ VISIT: HCPCS | Performed by: FAMILY MEDICINE

## 2023-04-06 PROCEDURE — 99213 OFFICE O/P EST LOW 20 MIN: CPT | Performed by: FAMILY MEDICINE

## 2023-04-07 NOTE — TELEPHONE ENCOUNTER
Reached patient for medication adherence consult. Patient failed adherence measure for pravastatin in 2022 per insurance report. Appears that patient has been filling her pravastatin every 3 months. Patient reports that she is taking her medication every day as prescribed without missing doses. Patient states that she is tolerating the medication well. Patient reports that she has enough medication right now and does not need a refill. Provided education on importance of adherence to pravastatin, metformin and Jardiance. Discussed indication for each medication. Patient denies any questions or concerns with medication.

## 2023-04-10 ENCOUNTER — TELEPHONE (OUTPATIENT)
Dept: INTERNAL MEDICINE CLINIC | Facility: CLINIC | Age: 73
End: 2023-04-10

## 2023-04-10 NOTE — TELEPHONE ENCOUNTER
Incoming fax from [facility]     Patients in home visit from 1/4/2023    Placed in TO in basket for review

## 2023-04-19 ENCOUNTER — HOSPITAL ENCOUNTER (OUTPATIENT)
Dept: CV DIAGNOSTICS | Facility: HOSPITAL | Age: 73
Discharge: HOME | End: 2023-04-19
Attending: FAMILY MEDICINE
Payer: MEDICARE

## 2023-04-19 DIAGNOSIS — I10 ESSENTIAL HYPERTENSION, BENIGN: ICD-10-CM

## 2023-04-19 DIAGNOSIS — R07.89 CHEST PRESSURE: ICD-10-CM

## 2023-04-19 DIAGNOSIS — E11.42 TYPE 2 DIABETES MELLITUS WITH DIABETIC POLYNEUROPATHY, WITHOUT LONG-TERM CURRENT USE OF INSULIN (HCC): ICD-10-CM

## 2023-04-19 PROCEDURE — 93017 CV STRESS TEST TRACING ONLY: CPT | Performed by: FAMILY MEDICINE

## 2023-04-19 PROCEDURE — 78452 HT MUSCLE IMAGE SPECT MULT: CPT | Performed by: FAMILY MEDICINE

## 2023-04-19 RX ORDER — REGADENOSON 0.08 MG/ML
INJECTION, SOLUTION INTRAVENOUS
Status: COMPLETED
Start: 2023-04-19 | End: 2023-04-19

## 2023-04-19 RX ADMIN — REGADENOSON 0.4 MG: 0.08 INJECTION, SOLUTION INTRAVENOUS at 11:31:00

## 2023-04-24 RX ORDER — ACETAMINOPHEN AND CODEINE PHOSPHATE 300; 30 MG/1; MG/1
TABLET ORAL
Qty: 50 TABLET | Refills: 0 | Status: SHIPPED | OUTPATIENT
Start: 2023-04-24

## 2023-04-24 RX ORDER — PRAVASTATIN SODIUM 20 MG
TABLET ORAL
Qty: 90 TABLET | Refills: 0 | Status: SHIPPED | OUTPATIENT
Start: 2023-04-24

## 2023-05-03 DIAGNOSIS — E11.42 TYPE 2 DIABETES MELLITUS WITH DIABETIC POLYNEUROPATHY, WITHOUT LONG-TERM CURRENT USE OF INSULIN (HCC): ICD-10-CM

## 2023-05-03 RX ORDER — TOPIRAMATE 50 MG/1
TABLET, FILM COATED ORAL
Qty: 180 TABLET | Refills: 1 | Status: SHIPPED | OUTPATIENT
Start: 2023-05-03

## 2023-05-03 RX ORDER — TOPIRAMATE 25 MG/1
TABLET ORAL
Qty: 180 TABLET | Refills: 1 | Status: SHIPPED | OUTPATIENT
Start: 2023-05-03

## 2023-05-03 RX ORDER — SPIRONOLACTONE 25 MG/1
TABLET ORAL
Qty: 90 TABLET | Refills: 1 | Status: SHIPPED | OUTPATIENT
Start: 2023-05-03

## 2023-05-03 RX ORDER — NORTRIPTYLINE HYDROCHLORIDE 25 MG/1
CAPSULE ORAL
Qty: 90 CAPSULE | Refills: 0 | Status: SHIPPED | OUTPATIENT
Start: 2023-05-03

## 2023-05-03 RX ORDER — EMPAGLIFLOZIN 10 MG/1
TABLET, FILM COATED ORAL
Qty: 90 TABLET | Refills: 1 | Status: SHIPPED | OUTPATIENT
Start: 2023-05-03

## 2023-05-10 ENCOUNTER — TELEPHONE (OUTPATIENT)
Dept: INTERNAL MEDICINE CLINIC | Facility: CLINIC | Age: 73
End: 2023-05-10

## 2023-05-10 NOTE — TELEPHONE ENCOUNTER
Incoming fax from     Patients DM eye exam from 5/10/2023    No DR per report    Epic updated and placed in TO in basket for review

## 2023-05-30 RX ORDER — ACETAMINOPHEN AND CODEINE PHOSPHATE 300; 30 MG/1; MG/1
TABLET ORAL
Qty: 50 TABLET | Refills: 0 | Status: SHIPPED | OUTPATIENT
Start: 2023-05-30

## 2023-06-12 RX ORDER — PREGABALIN 150 MG/1
CAPSULE ORAL
Qty: 180 CAPSULE | Refills: 0 | Status: SHIPPED | OUTPATIENT
Start: 2023-06-12

## 2023-06-28 ENCOUNTER — OFFICE VISIT (OUTPATIENT)
Dept: INTERNAL MEDICINE CLINIC | Facility: CLINIC | Age: 73
End: 2023-06-28
Payer: MEDICARE

## 2023-06-28 ENCOUNTER — HOSPITAL ENCOUNTER (OUTPATIENT)
Dept: GENERAL RADIOLOGY | Age: 73
Discharge: HOME | End: 2023-06-28
Attending: FAMILY MEDICINE
Payer: MEDICARE

## 2023-06-28 VITALS
DIASTOLIC BLOOD PRESSURE: 60 MMHG | OXYGEN SATURATION: 98 % | TEMPERATURE: 98 F | WEIGHT: 237.38 LBS | SYSTOLIC BLOOD PRESSURE: 112 MMHG | BODY MASS INDEX: 49.83 KG/M2 | HEART RATE: 50 BPM | HEIGHT: 58 IN

## 2023-06-28 DIAGNOSIS — M25.561 ACUTE PAIN OF RIGHT KNEE: Primary | ICD-10-CM

## 2023-06-28 DIAGNOSIS — M25.561 ACUTE PAIN OF RIGHT KNEE: ICD-10-CM

## 2023-06-28 PROCEDURE — 99213 OFFICE O/P EST LOW 20 MIN: CPT | Performed by: FAMILY MEDICINE

## 2023-06-28 PROCEDURE — 73562 X-RAY EXAM OF KNEE 3: CPT | Performed by: FAMILY MEDICINE

## 2023-06-28 RX ORDER — CELECOXIB 200 MG/1
CAPSULE ORAL
Qty: 30 CAPSULE | Refills: 0 | Status: SHIPPED | OUTPATIENT
Start: 2023-06-28 | End: 2023-06-29

## 2023-06-28 RX ORDER — ACETAMINOPHEN AND CODEINE PHOSPHATE 300; 30 MG/1; MG/1
1 TABLET ORAL EVERY 4 HOURS PRN
Qty: 50 TABLET | Refills: 0 | Status: SHIPPED | OUTPATIENT
Start: 2023-06-28

## 2023-06-28 RX ORDER — DICYCLOMINE HYDROCHLORIDE 10 MG/1
10 CAPSULE ORAL NIGHTLY PRN
Qty: 120 CAPSULE | Refills: 0 | Status: SHIPPED | OUTPATIENT
Start: 2023-06-28

## 2023-06-29 ENCOUNTER — TELEPHONE (OUTPATIENT)
Dept: INTERNAL MEDICINE CLINIC | Facility: CLINIC | Age: 73
End: 2023-06-29

## 2023-06-29 DIAGNOSIS — M25.561 ACUTE PAIN OF RIGHT KNEE: Primary | ICD-10-CM

## 2023-06-29 RX ORDER — MELOXICAM 7.5 MG/1
7.5 TABLET ORAL DAILY
Qty: 30 TABLET | Refills: 0 | Status: SHIPPED | OUTPATIENT
Start: 2023-06-29

## 2023-07-12 RX ORDER — PRAVASTATIN SODIUM 20 MG
TABLET ORAL
Qty: 90 TABLET | Refills: 0 | Status: SHIPPED
Start: 2023-07-12

## 2023-07-12 NOTE — TELEPHONE ENCOUNTER
Requesting   Name from pharmacy: PRAVASTATIN 20MG TABLETS          Will file in chart as: PRAVASTATIN 20 MG Oral Tab    Sig: TAKE 1 TABLET(20 MG) BY MOUTH EVERY EVENING    Disp: 90 tablet    Refills: 0 (Pharmacy requested: Not specified)    Start: 7/12/2023    Class: Normal    Non-formulary    Last ordered: 2 months ago by Trudy Langford MD Last refill: 4/24/2023    Rx #: [id number]    Cholesterol Medication Protocol Twldnf1207/12/2023 06:24 AM   Protocol Details ALT < 80    ALT resulted within past year    Lipid panel within past 12 months    Appointment within past 12 or next 3 months        LOV: 4/6/2023  RTC: 6 months   Last Relevant Labs: 2/9/2023  Filled: 4/24/2023 #90 with 0 refills    No future appointments.

## 2023-07-19 ENCOUNTER — TELEPHONE (OUTPATIENT)
Dept: INTERNAL MEDICINE CLINIC | Facility: CLINIC | Age: 73
End: 2023-07-19

## 2023-07-19 ENCOUNTER — OFFICE VISIT (OUTPATIENT)
Dept: INTERNAL MEDICINE CLINIC | Facility: CLINIC | Age: 73
End: 2023-07-19
Payer: MEDICARE

## 2023-07-19 ENCOUNTER — HOSPITAL ENCOUNTER (OUTPATIENT)
Dept: ULTRASOUND IMAGING | Facility: HOSPITAL | Age: 73
Discharge: HOME | End: 2023-07-19
Attending: FAMILY MEDICINE
Payer: MEDICARE

## 2023-07-19 VITALS
WEIGHT: 229.81 LBS | OXYGEN SATURATION: 98 % | HEART RATE: 87 BPM | HEIGHT: 58 IN | BODY MASS INDEX: 48.24 KG/M2 | DIASTOLIC BLOOD PRESSURE: 62 MMHG | TEMPERATURE: 98 F | SYSTOLIC BLOOD PRESSURE: 130 MMHG

## 2023-07-19 DIAGNOSIS — M25.561 ACUTE PAIN OF RIGHT KNEE: ICD-10-CM

## 2023-07-19 DIAGNOSIS — M79.89 RIGHT LEG SWELLING: Primary | ICD-10-CM

## 2023-07-19 DIAGNOSIS — M79.89 RIGHT LEG SWELLING: ICD-10-CM

## 2023-07-19 PROCEDURE — 99213 OFFICE O/P EST LOW 20 MIN: CPT | Performed by: FAMILY MEDICINE

## 2023-07-19 PROCEDURE — 93971 EXTREMITY STUDY: CPT | Performed by: FAMILY MEDICINE

## 2023-07-19 RX ORDER — ACETAMINOPHEN AND CODEINE PHOSPHATE 300; 30 MG/1; MG/1
1 TABLET ORAL EVERY 4 HOURS PRN
Qty: 50 TABLET | Refills: 0 | Status: SHIPPED | OUTPATIENT
Start: 2023-07-19

## 2023-07-19 NOTE — TELEPHONE ENCOUNTER
Spoke with pt. Relayed results, and gave contact info for PT. She v/u    Pt was taking meloxicam and she feels it didn't help at all. Pt feels the tylenol #3 helps the most.     She only has about 3 pills left, can pt get a refill or other recs for pain?

## 2023-07-19 NOTE — TELEPHONE ENCOUNTER
Spoke with Jose Alberto Her at [facility].   She stated STAT results state NO DVT

## 2023-07-26 RX ORDER — NORTRIPTYLINE HYDROCHLORIDE 25 MG/1
CAPSULE ORAL
Qty: 90 CAPSULE | Refills: 0 | Status: SHIPPED | OUTPATIENT
Start: 2023-07-26

## 2023-07-26 NOTE — TELEPHONE ENCOUNTER
Requesting    Name from pharmacy: NORTRIPTYLINE 25MG CAPSULES         Will file in chart as: NORTRIPTYLINE 25 MG Oral Cap    Sig: TAKE 1 CAPSULE BY MOUTH EVERY NIGHT AT BEDTIME    Disp: 90 capsule    Refills: 0 (Pharmacy requested: Not specified)    Start: 7/26/2023    Class: Normal    Non-formulary    Last ordered: 2 months ago by Lexie Chang MD Last refill: 6/2/2023    Rx #: [id number]        LOV: 6/28/2023  RTC: None noted   Last Relevant Labs: N/A   Filled: 5/3/2023 #90 with 0 refills    Future Appointments   Date Time Provider Damon Pittman   8/15/2023 11:15 AM Dennie Rea, [facility] PHYS [facility] [place]   8/17/2023  1:00 PM Dennie Rea, [facility] PHYS [facility] [place]   8/22/2023  9:45 AM Dennie Rea, [facility] PHYS [facility] [place]   8/24/2023  9:45 AM Dennie Rea, [facility] PHYS [facility] [place]   8/29/2023  9:45 AM Dennie Rea, [facility] PHYS [facility] [place]   9/5/2023  9:45 AM Dennie Rea, [facility] PHYS [facility] [place]

## 2023-08-05 DIAGNOSIS — M25.561 ACUTE PAIN OF RIGHT KNEE: ICD-10-CM

## 2023-08-06 RX ORDER — MELOXICAM 7.5 MG/1
7.5 TABLET ORAL DAILY
Qty: 30 TABLET | Refills: 0 | Status: SHIPPED | OUTPATIENT
Start: 2023-08-06

## 2023-08-15 ENCOUNTER — OFFICE VISIT (OUTPATIENT)
Dept: PHYSICAL THERAPY | Age: 73
End: 2023-08-15
Attending: FAMILY MEDICINE
Payer: MEDICARE

## 2023-08-15 DIAGNOSIS — M79.89 RIGHT LEG SWELLING: Primary | ICD-10-CM

## 2023-08-15 DIAGNOSIS — M25.561 ACUTE PAIN OF RIGHT KNEE: ICD-10-CM

## 2023-08-15 PROCEDURE — 97110 THERAPEUTIC EXERCISES: CPT

## 2023-08-15 PROCEDURE — 97162 PT EVAL MOD COMPLEX 30 MIN: CPT

## 2023-08-17 ENCOUNTER — OFFICE VISIT (OUTPATIENT)
Dept: PHYSICAL THERAPY | Age: 73
End: 2023-08-17
Attending: FAMILY MEDICINE
Payer: MEDICARE

## 2023-08-17 DIAGNOSIS — M79.89 RIGHT LEG SWELLING: Primary | ICD-10-CM

## 2023-08-17 PROCEDURE — 97110 THERAPEUTIC EXERCISES: CPT

## 2023-08-21 RX ORDER — ACETAMINOPHEN AND CODEINE PHOSPHATE 300; 30 MG/1; MG/1
1 TABLET ORAL EVERY 4 HOURS PRN
Qty: 50 TABLET | Refills: 0 | Status: SHIPPED | OUTPATIENT
Start: 2023-08-21

## 2023-08-21 NOTE — TELEPHONE ENCOUNTER
Requesting    Name from pharmacy: ACETAMINOPHEN/COD #3 (300/30MG) TAB         Will file in chart as: ACETAMINOPHEN-CODEINE 300-30 MG Oral Tab    Sig: TAKE 1 TABLET BY MOUTH EVERY 4 HOURS AS NEEDED    Disp: 50 tablet    Refills: 0 (Pharmacy requested: Not specified)    Start: 8/21/2023    Class: Normal    Non-formulary    Last ordered: 1 month ago by Wesly Mims MD Last refill: 7/19/2023    Rx #: [id number]        LOV: 7/19/2023  RTC: n/a  Last Relevant Labs: n/a  Filled: 7/19/2023 #50 with 0 refills    Future Appointments   Date Time Provider Damon Pittman   8/22/2023  9:45 AM Jevon Saldivar PT [facility] PHYS [facility] [place]   8/24/2023  9:45 AM Jevon Saldivar PT [facility] PHYS [facility] [place]   8/29/2023  9:45 AM Jevon Saldivar PT [facility] PHYS [facility] [place]   9/5/2023  9:45 AM Jevon Saldivar PT [facility] PHYS [facility] [place]

## 2023-08-22 ENCOUNTER — OFFICE VISIT (OUTPATIENT)
Dept: PHYSICAL THERAPY | Age: 73
End: 2023-08-22
Attending: FAMILY MEDICINE
Payer: MEDICARE

## 2023-08-22 DIAGNOSIS — M25.561 ACUTE PAIN OF RIGHT KNEE: Primary | ICD-10-CM

## 2023-08-22 PROCEDURE — 97140 MANUAL THERAPY 1/> REGIONS: CPT

## 2023-08-22 PROCEDURE — 97110 THERAPEUTIC EXERCISES: CPT

## 2023-08-24 ENCOUNTER — OFFICE VISIT (OUTPATIENT)
Dept: PHYSICAL THERAPY | Age: 73
End: 2023-08-24
Attending: FAMILY MEDICINE
Payer: MEDICARE

## 2023-08-24 DIAGNOSIS — M25.561 ACUTE PAIN OF RIGHT KNEE: Primary | ICD-10-CM

## 2023-08-24 PROCEDURE — 97110 THERAPEUTIC EXERCISES: CPT

## 2023-08-24 PROCEDURE — 97140 MANUAL THERAPY 1/> REGIONS: CPT

## 2023-08-29 ENCOUNTER — OFFICE VISIT (OUTPATIENT)
Dept: PHYSICAL THERAPY | Age: 73
End: 2023-08-29
Attending: FAMILY MEDICINE
Payer: MEDICARE

## 2023-08-29 DIAGNOSIS — M25.561 ACUTE PAIN OF RIGHT KNEE: Primary | ICD-10-CM

## 2023-08-29 PROCEDURE — 97110 THERAPEUTIC EXERCISES: CPT

## 2023-08-29 PROCEDURE — 97140 MANUAL THERAPY 1/> REGIONS: CPT

## 2023-09-04 DIAGNOSIS — M25.561 ACUTE PAIN OF RIGHT KNEE: ICD-10-CM

## 2023-09-04 RX ORDER — MELOXICAM 7.5 MG/1
7.5 TABLET ORAL DAILY
Qty: 30 TABLET | Refills: 0 | Status: SHIPPED | OUTPATIENT
Start: 2023-09-04

## 2023-09-05 ENCOUNTER — APPOINTMENT (OUTPATIENT)
Dept: PHYSICAL THERAPY | Age: 73
End: 2023-09-05
Attending: FAMILY MEDICINE
Payer: MEDICARE

## 2023-09-07 ENCOUNTER — OFFICE VISIT (OUTPATIENT)
Dept: PHYSICAL THERAPY | Age: 73
End: 2023-09-07
Attending: FAMILY MEDICINE
Payer: MEDICARE

## 2023-09-07 DIAGNOSIS — M25.561 ACUTE PAIN OF RIGHT KNEE: Primary | ICD-10-CM

## 2023-09-07 PROCEDURE — 97110 THERAPEUTIC EXERCISES: CPT

## 2023-09-15 ENCOUNTER — APPOINTMENT (OUTPATIENT)
Dept: PHYSICAL THERAPY | Age: 73
End: 2023-09-15
Attending: FAMILY MEDICINE
Payer: MEDICARE

## 2023-09-22 DIAGNOSIS — M25.561 ACUTE PAIN OF RIGHT KNEE: ICD-10-CM

## 2023-09-22 RX ORDER — ACETAMINOPHEN AND CODEINE PHOSPHATE 300; 30 MG/1; MG/1
1 TABLET ORAL EVERY 4 HOURS PRN
Qty: 50 TABLET | Refills: 0 | Status: SHIPPED | OUTPATIENT
Start: 2023-09-22

## 2023-09-22 RX ORDER — MELOXICAM 7.5 MG/1
7.5 TABLET ORAL DAILY
Qty: 30 TABLET | Refills: 0 | Status: SHIPPED | OUTPATIENT
Start: 2023-09-22

## 2023-09-22 NOTE — TELEPHONE ENCOUNTER
Requesting   Name from pharmacy: ACETAMINOPHEN/COD #3 (300/30MG) TAB          Will file in chart as: ACETAMINOPHEN-CODEINE 300-30 MG Oral Tab    Sig: TAKE 1 TABLET BY MOUTH EVERY 4 HOURS AS NEEDED    Disp: 50 tablet    Refills: 0 (Pharmacy requested: Not specified)    Start: 9/22/2023    Class: Normal    Non-formulary    Last ordered: 1 month ago (8/21/2023) by Johnie Tsai MD    Last refill: 8/21/2023    Rx #: [id number]        Name from pharmacy: MELOXICAM 7.5MG TABLETS         Will file in chart as: MELOXICAM 7.5 MG Oral Tab    Sig: TAKE 1 TABLET(7.5 MG) BY MOUTH DAILY    Disp: 30 tablet    Refills: 0 (Pharmacy requested: Not specified)    Start: 9/22/2023    Class: Normal    Non-formulary For: Acute pain of right knee    Last ordered: 2 weeks ago (9/4/2023) by Johnie Tsai MD    Last refill: 9/4/2023    Rx #: [id number]     LOV: 7/19/2023  RTC: none noted   Last Relevant Labs: n/a   Filled:   Tylenol #3-8/21/2023 #50 with 0 refills  Meloxicam-9/4/2023 #30 with 0 refills   No future appointments.

## 2023-10-02 RX ORDER — PREGABALIN 150 MG/1
150 CAPSULE ORAL 2 TIMES DAILY
Qty: 180 CAPSULE | Refills: 0 | Status: SHIPPED | OUTPATIENT
Start: 2023-10-02

## 2023-10-02 NOTE — TELEPHONE ENCOUNTER
Requesting    Name from pharmacy: PREGABALIN 150MG CAPSULES         Will file in chart as: PREGABALIN 150 MG Oral Cap    Sig: TAKE 1 CAPSULE BY MOUTH TWICE DAILY    Disp: 180 capsule    Refills: 0 (Pharmacy requested: Not specified)    Start: 9/30/2023    Class: Normal    Non-formulary    Last ordered: 3 months ago (6/12/2023) by Mery Skinner MD    Last refill: 6/12/2023    Rx #: [id number]        LOV: 7/19/2023  RTC: none noted   Last Relevant Labs: n/a   Filled: 6/12/2023 #180 with 0 refills    No future appointments.

## 2023-10-06 RX ORDER — PRAVASTATIN SODIUM 20 MG
20 TABLET ORAL EVERY EVENING
Qty: 90 TABLET | Refills: 0 | Status: SHIPPED | OUTPATIENT
Start: 2023-10-06

## 2023-10-06 RX ORDER — DICYCLOMINE HYDROCHLORIDE 10 MG/1
10 CAPSULE ORAL NIGHTLY PRN
Qty: 120 CAPSULE | Refills: 0 | Status: SHIPPED | OUTPATIENT
Start: 2023-10-06

## 2023-10-06 NOTE — TELEPHONE ENCOUNTER
Dicyclomine 10 mg  Filled 6-28-23  Qty 120  0 refills  No future appointments. LOV 4-6-23 TO    Pravastatin 20 mg  Filled 7-12-23  Qty 90  0 refills  No future appointments.   LOV 4-6-23 TO

## 2023-10-28 RX ORDER — ACETAMINOPHEN AND CODEINE PHOSPHATE 300; 30 MG/1; MG/1
1 TABLET ORAL EVERY 4 HOURS PRN
Qty: 50 TABLET | Refills: 0 | Status: SHIPPED | OUTPATIENT
Start: 2023-10-28

## 2023-10-28 NOTE — TELEPHONE ENCOUNTER
Requesting    Name from pharmacy: ACETAMINOPHEN/COD #3 (300/30MG) TAB         Will file in chart as: ACETAMINOPHEN-CODEINE 300-30 MG Oral Tab    Sig: TAKE 1 TABLET BY MOUTH EVERY 4 HOURS AS NEEDED    Disp: 50 tablet    Refills: 0 (Pharmacy requested: Not specified)    Start: 10/27/2023    Class: Normal    Non-formulary    Last ordered: 1 month ago (9/22/2023) by Lexie Chang MD    Last refill: 9/22/2023     LOV: 7/19/2023  RTC: none noted   Last Relevant Labs: n/a   Filled: 9/22/2023 #50 with 0 refills    No future appointments.

## 2023-11-05 DIAGNOSIS — M25.561 ACUTE PAIN OF RIGHT KNEE: ICD-10-CM

## 2023-11-05 DIAGNOSIS — E11.42 TYPE 2 DIABETES MELLITUS WITH DIABETIC POLYNEUROPATHY, WITHOUT LONG-TERM CURRENT USE OF INSULIN (HCC): ICD-10-CM

## 2023-11-06 RX ORDER — SPIRONOLACTONE 25 MG/1
25 TABLET ORAL DAILY
Qty: 90 TABLET | Refills: 0 | Status: SHIPPED | OUTPATIENT
Start: 2023-11-06

## 2023-11-06 RX ORDER — TOPIRAMATE 50 MG/1
50 TABLET, FILM COATED ORAL 2 TIMES DAILY
Qty: 180 TABLET | Refills: 0 | Status: SHIPPED | OUTPATIENT
Start: 2023-11-06

## 2023-11-06 RX ORDER — MELOXICAM 7.5 MG/1
7.5 TABLET ORAL DAILY
Qty: 30 TABLET | Refills: 1 | Status: SHIPPED | OUTPATIENT
Start: 2023-11-06

## 2023-11-06 NOTE — TELEPHONE ENCOUNTER
Requesting   Name from pharmacy: SPIRONOLACTONE 25MG TABLETS          Will file in chart as: SPIRONOLACTONE 25 MG Oral Tab    Sig: TAKE 1 TABLET(25 MG) BY MOUTH DAILY    Disp: 90 tablet    Refills: 1 (Pharmacy requested: Not specified)    Start: 11/5/2023    Class: Normal    Non-formulary    Last ordered: 6 months ago (5/3/2023) by Chuck Mims MD    Last refill: 9/5/2023    Rx #: [id number]    Hypertension Medications Protocol Mulnln2011/05/2023 05:58 AM   Protocol Details CMP or BMP in past 12 months    Last serum creatinine< 2.0    Appointment in past 6 or next 3 months       Name from pharmacy: [address]         Will file in chart as: METFORMIN HCL 1000 MG Oral Tab    Sig: TAKE 1 TABLET(1000 MG) BY MOUTH TWICE DAILY    Disp: 180 tablet    Refills: 1 (Pharmacy requested: Not specified)    Start: 11/5/2023    Class: Normal    Non-formulary For: Type 2 diabetes mellitus with diabetic polyneuropathy, without long-term current use of insulin (HCC)    Last ordered: 6 months ago (5/3/2023) by Chuck Mims MD    Last refill: 9/5/2023    Rx #: [id number]    Diabetic Medication Protocol Tkkurd3011/05/2023 05:58 AM   Protocol Details HgBA1C procedure resulted in past 6 months    Last HgBA1C < 7.5    Microalbumin procedure in past 12 months or taking ACE/ARB    Appointment in past 6 or next 3 months       Name from pharmacy: TOPIRAMATE 50MG TABLETS         Will file in chart as: TOPIRAMATE 50 MG Oral Tab    Sig: TAKE 1 TABLET BY MOUTH TWICE DAILY WITH THE 25 MG TABLET FOR TOTAL OF 75 MG TWICE DAILY    Disp: 180 tablet    Refills: 1 (Pharmacy requested: Not specified)    Start: 11/5/2023    Class: Normal    Non-formulary    Last ordered: 6 months ago (5/3/2023) by Chuck Mims MD    Last refill: 9/5/2023    Rx #: [id number]        Name from pharmacy: MELOXICAM 7.5MG TABLETS         Will file in chart as: MELOXICAM 7.5 MG Oral Tab    Sig: TAKE 1 TABLET(7.5 MG) BY MOUTH DAILY    Disp: 30 tablet    Refills: 0 (Pharmacy requested: Not specified)    Start: 11/5/2023    Class: Normal    Non-formulary For: Acute pain of right knee    Last ordered: 1 month ago (9/22/2023) by Ric Collins MD    Last refill: 9/22/2023     LOV: 7/19/2023  RTC: none noted  Last Relevant Labs: 2/9/2023&3/7/2023  Filled: 5/3/2023 6 month supply   Future Appointments   Date Time Provider Damon Pittman   11/9/2023 10:30 AM Shankar Durbin MD [facility] 8 [facility] [place]

## 2023-11-09 ENCOUNTER — HOSPITAL ENCOUNTER (EMERGENCY)
Facility: HOSPITAL | Age: 73
Discharge: HOME | End: 2023-11-09
Attending: EMERGENCY MEDICINE
Payer: MEDICARE

## 2023-11-09 ENCOUNTER — OFFICE VISIT (OUTPATIENT)
Dept: INTERNAL MEDICINE CLINIC | Facility: CLINIC | Age: 73
End: 2023-11-09
Payer: MEDICARE

## 2023-11-09 ENCOUNTER — APPOINTMENT (OUTPATIENT)
Dept: GENERAL RADIOLOGY | Facility: HOSPITAL | Age: 73
End: 2023-11-09
Payer: MEDICARE

## 2023-11-09 VITALS
DIASTOLIC BLOOD PRESSURE: 56 MMHG | RESPIRATION RATE: 17 BRPM | SYSTOLIC BLOOD PRESSURE: 136 MMHG | OXYGEN SATURATION: 95 % | HEART RATE: 89 BPM

## 2023-11-09 DIAGNOSIS — E11.42 TYPE 2 DIABETES MELLITUS WITH DIABETIC POLYNEUROPATHY, WITHOUT LONG-TERM CURRENT USE OF INSULIN (HCC): ICD-10-CM

## 2023-11-09 DIAGNOSIS — J69.0 ASPIRATION PNEUMONITIS (HCC): Primary | ICD-10-CM

## 2023-11-09 LAB
ALBUMIN SERPL-MCNC: 3.5 G/DL (ref 3.4–5)
ALBUMIN/GLOB SERPL: 0.8 {RATIO} (ref 1–2)
ALP LIVER SERPL-CCNC: 145 U/L
ALT SERPL-CCNC: 54 U/L
ANION GAP SERPL CALC-SCNC: 5 MMOL/L (ref 0–18)
AST SERPL-CCNC: 44 U/L (ref 15–37)
BASOPHILS # BLD AUTO: 0.01 X10(3) UL (ref 0–0.2)
BASOPHILS NFR BLD AUTO: 0.2 %
BILIRUB SERPL-MCNC: 0.5 MG/DL (ref 0.1–2)
BUN BLD-MCNC: 15 MG/DL (ref 9–23)
CALCIUM BLD-MCNC: 9.1 MG/DL (ref 8.5–10.1)
CHLORIDE SERPL-SCNC: 108 MMOL/L (ref 98–112)
CO2 SERPL-SCNC: 25 MMOL/L (ref 21–32)
CREAT BLD-MCNC: 1.07 MG/DL
EGFRCR SERPLBLD CKD-EPI 2021: 55 ML/MIN/1.73M2 (ref 60–?)
EOSINOPHIL # BLD AUTO: 0.09 X10(3) UL (ref 0–0.7)
EOSINOPHIL NFR BLD AUTO: 1.5 %
ERYTHROCYTE [DISTWIDTH] IN BLOOD BY AUTOMATED COUNT: 15.2 %
GLOBULIN PLAS-MCNC: 4.3 G/DL (ref 2.8–4.4)
GLUCOSE BLD-MCNC: 164 MG/DL (ref 70–99)
HCT VFR BLD AUTO: 38.7 %
HGB BLD-MCNC: 12.1 G/DL
IMM GRANULOCYTES # BLD AUTO: 0.02 X10(3) UL (ref 0–1)
IMM GRANULOCYTES NFR BLD: 0.3 %
LYMPHOCYTES # BLD AUTO: 1.58 X10(3) UL (ref 1–4)
LYMPHOCYTES NFR BLD AUTO: 26.4 %
MCH RBC QN AUTO: 29.5 PG (ref 26–34)
MCHC RBC AUTO-ENTMCNC: 31.3 G/DL (ref 31–37)
MCV RBC AUTO: 94.4 FL
MONOCYTES # BLD AUTO: 0.52 X10(3) UL (ref 0.1–1)
MONOCYTES NFR BLD AUTO: 8.7 %
NEUTROPHILS # BLD AUTO: 3.76 X10 (3) UL (ref 1.5–7.7)
NEUTROPHILS # BLD AUTO: 3.76 X10(3) UL (ref 1.5–7.7)
NEUTROPHILS NFR BLD AUTO: 62.9 %
OSMOLALITY SERPL CALC.SUM OF ELEC: 290 MOSM/KG (ref 275–295)
PLATELET # BLD AUTO: 195 10(3)UL (ref 150–450)
POTASSIUM SERPL-SCNC: 3.5 MMOL/L (ref 3.5–5.1)
PROT SERPL-MCNC: 7.8 G/DL (ref 6.4–8.2)
RBC # BLD AUTO: 4.1 X10(6)UL
SODIUM SERPL-SCNC: 138 MMOL/L (ref 136–145)
TROPONIN I SERPL HS-MCNC: 6 NG/L
TROPONIN I SERPL HS-MCNC: 6 NG/L
WBC # BLD AUTO: 6 X10(3) UL (ref 4–11)

## 2023-11-09 PROCEDURE — 80053 COMPREHEN METABOLIC PANEL: CPT | Performed by: EMERGENCY MEDICINE

## 2023-11-09 PROCEDURE — 71045 X-RAY EXAM CHEST 1 VIEW: CPT

## 2023-11-09 PROCEDURE — 94640 AIRWAY INHALATION TREATMENT: CPT

## 2023-11-09 PROCEDURE — 85025 COMPLETE CBC W/AUTO DIFF WBC: CPT | Performed by: EMERGENCY MEDICINE

## 2023-11-09 PROCEDURE — 99285 EMERGENCY DEPT VISIT HI MDM: CPT

## 2023-11-09 PROCEDURE — 94799 UNLISTED PULMONARY SVC/PX: CPT

## 2023-11-09 PROCEDURE — 93010 ELECTROCARDIOGRAM REPORT: CPT

## 2023-11-09 PROCEDURE — 99284 EMERGENCY DEPT VISIT MOD MDM: CPT

## 2023-11-09 PROCEDURE — 93005 ELECTROCARDIOGRAM TRACING: CPT

## 2023-11-09 PROCEDURE — 36415 COLL VENOUS BLD VENIPUNCTURE: CPT

## 2023-11-09 PROCEDURE — 84484 ASSAY OF TROPONIN QUANT: CPT | Performed by: EMERGENCY MEDICINE

## 2023-11-09 RX ORDER — IPRATROPIUM BROMIDE AND ALBUTEROL SULFATE 2.5; .5 MG/3ML; MG/3ML
3 SOLUTION RESPIRATORY (INHALATION) ONCE
Status: COMPLETED | OUTPATIENT
Start: 2023-11-09 | End: 2023-11-09

## 2023-11-09 RX ORDER — PREDNISONE 20 MG/1
60 TABLET ORAL ONCE
Status: COMPLETED | OUTPATIENT
Start: 2023-11-09 | End: 2023-11-09

## 2023-11-09 RX ORDER — AMOXICILLIN AND CLAVULANATE POTASSIUM 875; 125 MG/1; MG/1
1 TABLET, FILM COATED ORAL 2 TIMES DAILY
Qty: 20 TABLET | Refills: 0 | Status: SHIPPED | OUTPATIENT
Start: 2023-11-09 | End: 2023-11-19

## 2023-11-09 RX ORDER — PREDNISONE 20 MG/1
40 TABLET ORAL DAILY
Qty: 10 TABLET | Refills: 0 | Status: SHIPPED | OUTPATIENT
Start: 2023-11-09 | End: 2023-11-14

## 2023-11-09 RX ORDER — ALBUTEROL SULFATE 90 UG/1
2 AEROSOL, METERED RESPIRATORY (INHALATION) ONCE
Status: COMPLETED | OUTPATIENT
Start: 2023-11-09 | End: 2023-11-09

## 2023-11-09 NOTE — ED INITIAL ASSESSMENT (HPI)
Patient was at scheduled PCP office visit today and felt like she was going to pass out. Did not have syncopal episode. Was also have acid reflux/regurgitation this AM. Vomited x1.  Swelling to bilateral extremities,

## 2023-11-09 NOTE — ED PROVIDER NOTES
Patient Seen in: [facility] Emergency Department      History     Chief Complaint   Patient presents with    Swelling    Syncope     Stated Complaint: multi    Subjective:   HPI    71-year-old female presents for evaluation after a possible aspiration event. Patient woke up this morning with a feeling of acidity in her throat, she then started choking and gagging. Believes she aspirated. States this happens to her every few months. Admits she did eat a large dinner including a hamburger later at night which she subsequently vomited up. She proceeded to her regularly scheduled appointment with her PCP today to discuss ongoing right knee pain resistant to recent physical therapy. When she presented to the intake desk, she appeared short of breath and felt as though she was going to pass out.   911 was called    Objective:   Past Medical History:   Diagnosis Date    Abdominal hernia     Anxiety     Arthritis     Back pain     Blurred vision     Change in hair     Colitis     Depression     DIABETES     Diabetes mellitus ([address]     Esophageal reflux     Essential hypertension, benign 2013    Food intolerance     Heartburn     Indigestion     Lipid screening 2012    Low blood potassium     potassium is going up and down     Measles without mention of complication     Mumps without mention of complication     Night sweats     Osteoarthrosis, unspecified whether generalized or localized, unspecified site     Other and unspecified hyperlipidemia     OTHER DISEASES     bronchitis    Pain in joints     Pain with bowel movements     Pure hypercholesterolemia 2013    Type II or unspecified type diabetes mellitus without mention of complication, not stated as uncontrolled     Unspecified essential hypertension     Varicella without mention of complication     Visual impairment     Wears glasses               Past Surgical History:   Procedure Laterality Date    BACK SURGERY             x3    COLONOSCOPY  2012    rck 10 yrs    COLONOSCOPY N/A 2022    Procedure: COLONOSCOPY,   ESOPHAGOGASTRODUODENOSCOPY with biopsies;  Surgeon: Smita Harper DO;  [facility]: [facility] ENDOSCOPY    HEMORRHOIDECTOMY      HEMORRHOIDECTOMY,INT/EXT,SIMPLE      HERNIA SURGERY      HERNIA SURGERY      abdominal hernia repair    KNEE REPLACEMENT SURGERY  2010, 2010    lt in 2010, rt in 2010    LAMINECTOMY,LUMBAR      L4 L5    OTHER SURGICAL HISTORY      anal fistulectomy                Social History     Socioeconomic History    Marital status:    Tobacco Use    Smoking status: Former     Packs/day: 0.30     Years: 12.00     Additional pack years: 0.00     Total pack years: 3.60     Types: Cigarettes     Quit date: 2015     Years since quittin.8    Smokeless tobacco: Never   Vaping Use    Vaping Use: Never used   Substance and Sexual Activity    Alcohol use: No     Alcohol/week: 0.0 standard drinks of alcohol    Drug use: No   Other Topics Concern    Caffeine Concern Yes     Comment: 0-1 cup daily. Stress Concern Yes    Weight Concern Yes    Special Diet Yes     Comment: diabetic diet     Exercise No    Seat Belt Yes              Review of Systems    Positive for stated complaint: multi  Other systems are as noted in HPI. Constitutional and vital signs reviewed. All other systems reviewed and negative except as noted above. Physical Exam     ED Triage Vitals [23 1115]   /60   Pulse 97   Resp 16   Temp    Temp src    SpO2 97 %   O2 Device None (Room air)       Current:/60   Pulse 92   Resp 19   SpO2 98%         Physical Exam    General: Alert, oriented, no apparent distress, no drooling no stridor  HEENT: Atraumatic, normocephalic. Pupils equal reactive. Extraocular motions intact. Oropharynx clear. Neck: Supple  Lungs: Trace inspiratory and expiratory wheeze  Heart: Regular rate and rhythm. Abdomen: Soft, nontender. Skin: No rash.   No edema. Neurologic: No focal neurologic deficits. Normal speech pattern  Musculoskeletal: No tenderness or deformity noted. Full range of motion throughout. ED Course     Labs Reviewed   COMP METABOLIC PANEL (14) - Abnormal; Notable for the following components:       Result Value    Glucose 164 (*)     Creatinine 1.07 (*)     eGFR-Cr 55 (*)     Alkaline Phosphatase 145 (*)     A/G Ratio 0.8 (*)     All other components within normal limits   REDRAW CMP (P) - Abnormal; Notable for the following components:    AST 44 (*)     All other components within normal limits   TROPONIN I HIGH SENSITIVITY - Normal   TROPONIN I HIGH SENSITIVITY - Normal   CBC WITH DIFFERENTIAL WITH PLATELET    Narrative: The following orders were created for panel order CBC With Differential With Platelet. Procedure                               Abnormality         Status                     ---------                               -----------         ------                     CBC W/ DIFFERENTIAL[[id number]]                              Final result                 Please view results for these tests on the individual orders. RAINBOW DRAW LAVENDER   RAINBOW DRAW LIGHT GREEN   RAINBOW DRAW BLUE   CBC W/ DIFFERENTIAL     EKG    Rate, intervals and axes as noted on EKG Report. Rate: 90  Rhythm: Sinus Rhythm  Reading: no ST elevation or depression                          MDM          Differential diagnosis includes pulmonary edema versus aspiration pneumonia versus reactive airway disease    I have independently visualized the radiology images, my focus/limited interpretation: No pneumonia or pulmonary edema    Defer to radiologist for other/incidental findings    XR CHEST AP PORTABLE  (CPT=71045)    Result Date: 11/9/2023  PROCEDURE:  XR CHEST AP PORTABLE  (CPT=71045)  TECHNIQUE:  AP chest radiograph was obtained.   COMPARISON:  MANUEL XR, XR CHEST PA + LAT CHEST (CPT=71046), 2/26/2021, 12:37 PM.  INDICATIONS:  SYNCOPE PATIENT STATED HISTORY: (As transcribed by Technologist)  Pt passed out this out this morning    FINDINGS:  Stable linear regions of scarring along the lingula, left lower lobe and right middle lobe. Lungs are otherwise clear without evidence of acute cardiopulmonary disease. Stable mild cardiomegaly. Normal pulmonary vasculature. No evidence of  mediastinal enlargement. No pleural disease. A           CONCLUSION:  No acute cardiopulmonary disease. Please see further details above   LOCATION:  [facility]      Dictated by (CST): Cash Posadas DO on 11/09/2023 at 11:18 AM     Finalized by (CST): Cash Posadas,  on 11/09/2023 at 11:22 AM        Medications   ipratropium-albuterol (Duoneb) 0.5-2.5 (3) MG/3ML inhalation solution 3 mL (3 mL Nebulization Given 11/9/23 1134)   ipratropium-albuterol (Duoneb) 0.5-2.5 (3) MG/3ML inhalation solution 3 mL (3 mL Nebulization Given 11/9/23 1320)   predniSONE (Deltasone) tab 60 mg (60 mg Oral Given 11/9/23 1340)   albuterol (Ventolin HFA) 108 (90 Base) MCG/ACT inhaler 2 puff (2 puffs Inhalation Given 11/9/23 1417)     Wheezing better after nebs. Suspect aspiration based on history provided. Plan for prednisone and Augmentin with albuterol as needed      Chemistry unremarkable       CBC normal     Troponin x2 unremarkable    Evaluation and treatment plan discussed with patient and family members present. Close follow-up is advised for continuing symptoms. Return to ER recommended if worsening or changing symptoms arise prior to follow-up         Medical Decision Making  Amount and/or Complexity of Data Reviewed  Independent Historian: spouse     Details:  at bedside        Disposition and Plan     Clinical Impression:  1.  Aspiration pneumonitis [facility])         Disposition:  Discharge  11/9/2023  2:30 pm    Follow-up:  Milly Barragan MD  [address] [place] [address]    Call in 2 day(s)            Medications Prescribed:  Current Discharge Medication List        START taking these medications    Details   amoxicillin clavulanate 875-125 MG Oral Tab Take 1 tablet by mouth 2 (two) times daily for 10 days. Qty: 20 tablet, Refills: 0      predniSONE 20 MG Oral Tab Take 2 tablets (40 mg total) by mouth daily for 5 days.   Qty: 10 tablet, Refills: 0

## 2023-11-09 NOTE — DISCHARGE INSTRUCTIONS
Use albuterol inhaler 2 puffs every 4 hours as needed for wheeze/shortness of breath. Follow-up with your doctor in 2 to 3 days to reassess. Start antibiotic today.   Next dose of prednisone tomorrow    If symptoms worsen or new symptoms develop return here

## 2023-11-10 LAB
ATRIAL RATE: 90 BPM
P AXIS: 49 DEGREES
P-R INTERVAL: 156 MS
Q-T INTERVAL: 358 MS
QRS DURATION: 88 MS
QTC CALCULATION (BEZET): 437 MS
R AXIS: 20 DEGREES
T AXIS: 35 DEGREES
VENTRICULAR RATE: 90 BPM

## 2023-11-15 ENCOUNTER — OFFICE VISIT (OUTPATIENT)
Dept: INTERNAL MEDICINE CLINIC | Facility: CLINIC | Age: 73
End: 2023-11-15
Payer: MEDICARE

## 2023-11-15 VITALS
DIASTOLIC BLOOD PRESSURE: 62 MMHG | OXYGEN SATURATION: 98 % | HEIGHT: 58 IN | TEMPERATURE: 98 F | BODY MASS INDEX: 47.69 KG/M2 | WEIGHT: 227.19 LBS | HEART RATE: 50 BPM | SYSTOLIC BLOOD PRESSURE: 110 MMHG

## 2023-11-15 DIAGNOSIS — E11.42 TYPE 2 DIABETES MELLITUS WITH DIABETIC POLYNEUROPATHY, WITHOUT LONG-TERM CURRENT USE OF INSULIN (HCC): Primary | ICD-10-CM

## 2023-11-15 DIAGNOSIS — M25.561 ACUTE PAIN OF RIGHT KNEE: ICD-10-CM

## 2023-11-15 DIAGNOSIS — E66.01 MORBID OBESITY DUE TO EXCESS CALORIES (HCC): ICD-10-CM

## 2023-11-15 DIAGNOSIS — R60.0 LOWER EXTREMITY EDEMA: ICD-10-CM

## 2023-11-15 DIAGNOSIS — K21.9 GASTROESOPHAGEAL REFLUX DISEASE WITHOUT ESOPHAGITIS: ICD-10-CM

## 2023-11-15 DIAGNOSIS — Z12.31 VISIT FOR SCREENING MAMMOGRAM: ICD-10-CM

## 2023-11-15 DIAGNOSIS — I51.7 CARDIOMEGALY: ICD-10-CM

## 2023-11-15 PROCEDURE — 90662 IIV NO PRSV INCREASED AG IM: CPT | Performed by: FAMILY MEDICINE

## 2023-11-15 PROCEDURE — 99214 OFFICE O/P EST MOD 30 MIN: CPT | Performed by: FAMILY MEDICINE

## 2023-11-15 PROCEDURE — G0008 ADMIN INFLUENZA VIRUS VAC: HCPCS | Performed by: FAMILY MEDICINE

## 2023-11-29 RX ORDER — ACETAMINOPHEN AND CODEINE PHOSPHATE 300; 30 MG/1; MG/1
1 TABLET ORAL EVERY 4 HOURS PRN
Qty: 50 TABLET | Refills: 0 | Status: SHIPPED | OUTPATIENT
Start: 2023-11-29

## 2023-11-29 NOTE — TELEPHONE ENCOUNTER
Requesting    Name from pharmacy: ACETAMINOPHEN/COD #3 (300/30MG) TAB         Will file in chart as: ACETAMINOPHEN-CODEINE 300-30 MG Oral Tab    Sig: TAKE 1 TABLET BY MOUTH EVERY 4 HOURS AS NEEDED    Disp: 50 tablet    Refills: 0 (Pharmacy requested: Not specified)    Start: 11/29/2023    Class: Normal    Non-formulary    Last ordered: 1 month ago (10/28/2023) by Darlene Torres MD    Last refill: 10/29/2023    Rx #: [id number]     LOV: 11/15/2023  RTC: none noted   Last Relevant Labs: n/a   Filled: 10/28/2023 #50 with 0 refills    Future Appointments   Date Time Provider Damon Pittman   12/16/2023 11:00 AM TAD [place] RM1 [facility] [place]   1/2/2024  9:30 AM [address] CARD ECHO RM 1 ECARD ECHO [facility] AT [facility]

## 2023-11-30 NOTE — TELEPHONE ENCOUNTER
Jardiance 10 mg    Diabetic Medication Protocol Wjpksg2011/30/2023 06:00 AM   Protocol Details HgBA1C procedure resulted in past 6 months    Last HgBA1C < 7.5      Filled 5-3-23  Qty 90  1 refill  Future Appointments   Date Time Provider Damon Pittman   12/16/2023 11:00 AM TAD [facility] RM1 TAD [place]   1/2/2024  9:30 AM  CARD ECHO RM 1 ECARD [facility] AT [facility] 11-15-23 TO  Labs 2-9-23 A1c/microalb./Creat.

## 2023-12-16 ENCOUNTER — LAB ENCOUNTER (OUTPATIENT)
Dept: LAB | Age: 73
End: 2023-12-16
Attending: FAMILY MEDICINE
Payer: MEDICARE

## 2023-12-16 ENCOUNTER — HOSPITAL ENCOUNTER (OUTPATIENT)
Dept: MAMMOGRAPHY | Age: 73
Discharge: HOME | End: 2023-12-16
Attending: FAMILY MEDICINE
Payer: MEDICARE

## 2023-12-16 DIAGNOSIS — R60.0 LOWER EXTREMITY EDEMA: ICD-10-CM

## 2023-12-16 DIAGNOSIS — Z12.31 VISIT FOR SCREENING MAMMOGRAM: ICD-10-CM

## 2023-12-16 DIAGNOSIS — I51.7 CARDIOMEGALY: ICD-10-CM

## 2023-12-16 DIAGNOSIS — E11.42 TYPE 2 DIABETES MELLITUS WITH DIABETIC POLYNEUROPATHY, WITHOUT LONG-TERM CURRENT USE OF INSULIN (HCC): ICD-10-CM

## 2023-12-16 LAB
EST. AVERAGE GLUCOSE BLD GHB EST-MCNC: 134 MG/DL (ref 68–126)
HBA1C MFR BLD: 6.3 % (ref ?–5.7)
NT-PROBNP SERPL-MCNC: 57 PG/ML (ref ?–125)

## 2023-12-16 PROCEDURE — 77063 BREAST TOMOSYNTHESIS BI: CPT | Performed by: FAMILY MEDICINE

## 2023-12-16 PROCEDURE — 77067 SCR MAMMO BI INCL CAD: CPT | Performed by: FAMILY MEDICINE

## 2023-12-16 PROCEDURE — 36415 COLL VENOUS BLD VENIPUNCTURE: CPT

## 2023-12-16 PROCEDURE — 83880 ASSAY OF NATRIURETIC PEPTIDE: CPT

## 2023-12-16 PROCEDURE — 83036 HEMOGLOBIN GLYCOSYLATED A1C: CPT

## 2023-12-30 DIAGNOSIS — M25.561 ACUTE PAIN OF RIGHT KNEE: ICD-10-CM

## 2023-12-30 RX ORDER — ACETAMINOPHEN AND CODEINE PHOSPHATE 300; 30 MG/1; MG/1
1 TABLET ORAL EVERY 4 HOURS PRN
Qty: 50 TABLET | Refills: 0 | Status: SHIPPED | OUTPATIENT
Start: 2023-12-30

## 2023-12-30 RX ORDER — MELOXICAM 7.5 MG/1
7.5 TABLET ORAL DAILY
Qty: 30 TABLET | Refills: 1 | Status: SHIPPED | OUTPATIENT
Start: 2023-12-30

## 2023-12-30 RX ORDER — DICYCLOMINE HYDROCHLORIDE 10 MG/1
10 CAPSULE ORAL NIGHTLY PRN
Qty: 120 CAPSULE | Refills: 0 | Status: SHIPPED | OUTPATIENT
Start: 2023-12-30

## 2023-12-30 RX ORDER — PRAVASTATIN SODIUM 20 MG
20 TABLET ORAL EVERY EVENING
Qty: 90 TABLET | Refills: 0 | Status: SHIPPED | OUTPATIENT
Start: 2023-12-30

## 2024-01-20 ENCOUNTER — OFFICE VISIT (OUTPATIENT)
Dept: INTERNAL MEDICINE CLINIC | Facility: CLINIC | Age: 74
End: 2024-01-20
Payer: MEDICARE

## 2024-01-20 VITALS
TEMPERATURE: 98 F | HEART RATE: 93 BPM | OXYGEN SATURATION: 95 % | DIASTOLIC BLOOD PRESSURE: 62 MMHG | WEIGHT: 227.19 LBS | HEIGHT: 58 IN | BODY MASS INDEX: 47.69 KG/M2 | SYSTOLIC BLOOD PRESSURE: 114 MMHG

## 2024-01-20 DIAGNOSIS — M25.561 ACUTE PAIN OF RIGHT KNEE: Primary | ICD-10-CM

## 2024-01-20 DIAGNOSIS — E66.01 MORBID OBESITY DUE TO EXCESS CALORIES (HCC): ICD-10-CM

## 2024-01-20 DIAGNOSIS — K21.9 GASTROESOPHAGEAL REFLUX DISEASE WITHOUT ESOPHAGITIS: ICD-10-CM

## 2024-01-20 PROCEDURE — 99214 OFFICE O/P EST MOD 30 MIN: CPT | Performed by: FAMILY MEDICINE

## 2024-01-20 NOTE — PROGRESS NOTES
Felicia Velez is a 73 year old female.  HPI:   Here to go over the R knee pain she is having    Had BL TKA in 2010 by Dr Flor     did see Dr Dai yesterday     He stated he does not want to do it d/t her weight and bone structure      The pain is getting worse     Did do PT over the summer and did not help a lot      Feels like it may be getting unstable       On T #3  for pain prn    tries to limit it to 1 a day    usually in the AM    Does use heat pad on it    left it on too long 2 nights ago   has a small blister on it     Asking about her GI meds as well as other meds   Current Outpatient Medications   Medication Sig Dispense Refill    pravastatin 20 MG Oral Tab Take 1 tablet (20 mg total) by mouth every evening. 90 tablet 0    dicyclomine 10 MG Oral Cap Take 1 capsule (10 mg total) by mouth nightly as needed. 120 capsule 0    Meloxicam 7.5 MG Oral Tab Take 1 tablet (7.5 mg total) by mouth daily. 30 tablet 1    acetaminophen-codeine 300-30 MG Oral Tab Take 1 tablet by mouth every 4 (four) hours as needed. 50 tablet 0    empagliflozin (JARDIANCE) 10 MG Oral Tab Take 1 tablet (10 mg total) by mouth daily. 90 tablet 1    spironolactone 25 MG Oral Tab Take 1 tablet (25 mg total) by mouth daily. 90 tablet 0    metFORMIN HCl 1000 MG Oral Tab Take 1 tablet (1,000 mg total) by mouth 2 (two) times daily. 180 tablet 0    topiramate 50 MG Oral Tab Take 1 tablet (50 mg total) by mouth 2 (two) times daily. 180 tablet 0    pregabalin 150 MG Oral Cap Take 1 capsule (150 mg total) by mouth 2 (two) times daily. 180 capsule 0    nortriptyline 25 MG Oral Cap TAKE 1 CAPSULE BY MOUTH EVERY NIGHT AT BEDTIME 90 capsule 0    TOPIRAMATE 25 MG Oral Tab TAKE 1 TABLET(25 MG) BY MOUTH TWICE DAILY 180 tablet 1    FAMOTIDINE 40 MG Oral Tab TAKE 1 TABLET(40 MG) BY MOUTH EVERY NIGHT 90 tablet 3    omeprazole 20 MG Oral Capsule Delayed Release Take 1 capsule (20 mg total) by mouth in the morning and 1 capsule (20 mg total) in the evening.  Take before meals. 180 capsule 3    Blood Glucose Monitoring Suppl (ACCU-CHEK GUIDE) w/Device Does not apply Kit 1 kit by Other route daily. 1 kit 0    Glucose Blood (ACCU-CHEK GUIDE) In Vitro Strip 1 strip by In Vitro route daily. 100 strip 0    OneTouch Delica Lancets 33G Does not apply Misc USE DAILY AS DIRECTED 100 each 0    ONETOUCH ULTRA BLUE In Vitro Strip TEST SUGAR DAILY 100 strip 0    aspirin (ASPIR-81) 81 MG Oral Tab EC Take 1 tablet (81 mg total) by mouth daily. 1 tablet 0    ondansetron 8 MG Oral Tablet Dispersible Take 1 tablet (8 mg total) by mouth 3 (three) times daily as needed. (Patient not taking: Reported on 1/20/2024)      Ciclopirox 8 % External Solution apply to affected toenails at bedtime for 3 months (Patient not taking: Reported on 11/15/2023) 1 each 2    HYDROCHLOROTHIAZIDE OR  (Patient not taking: Reported on 11/15/2023)      Ursodiol 500 MG Oral Tab  (Patient not taking: Reported on 11/15/2023)      ursodiol 300 MG Oral Cap Take 1 capsule (300 mg total) by mouth 3 (three) times daily. Patient takes:  1.5 tabs in morning  1 tab in afternoon  1 tab at night (Patient not taking: Reported on 11/15/2023)        Past Medical History:   Diagnosis Date    Abdominal hernia     Anxiety     Arthritis     Back pain     Blurred vision     Change in hair     Colitis     Depression     DIABETES     Diabetes mellitus (HCC)     Esophageal reflux     Essential hypertension, benign 04/12/2013    Food intolerance     Heartburn     Indigestion     Lipid screening 04/25/2012    Low blood potassium     potassium is going up and down     Measles without mention of complication     Mumps without mention of complication     Night sweats     Osteoarthrosis, unspecified whether generalized or localized, unspecified site     Other and unspecified hyperlipidemia     OTHER DISEASES     bronchitis    Pain in joints     Pain with bowel movements     Pure hypercholesterolemia 08/12/2013    Type II or unspecified type  diabetes mellitus without mention of complication, not stated as uncontrolled     Unspecified essential hypertension     Varicella without mention of complication     Visual impairment     Wears glasses       Social History:  Social History     Socioeconomic History    Marital status:    Tobacco Use    Smoking status: Former     Packs/day: 0.30     Years: 12.00     Additional pack years: 0.00     Total pack years: 3.60     Types: Cigarettes     Quit date: 2015     Years since quittin.0    Smokeless tobacco: Never   Vaping Use    Vaping Use: Never used   Substance and Sexual Activity    Alcohol use: No     Alcohol/week: 0.0 standard drinks of alcohol    Drug use: No   Other Topics Concern    Caffeine Concern Yes     Comment: 0-1 cup daily.     Stress Concern Yes    Weight Concern Yes    Special Diet Yes     Comment: diabetic diet     Exercise No    Seat Belt Yes        REVIEW OF SYSTEMS:   GENERAL HEALTH: feels well otherwise       EXAM:   /62   Pulse 93   Temp 97.6 °F (36.4 °C) (Temporal)   Ht 4' 10\" (1.473 m)   Wt 227 lb 3.2 oz (103.1 kg)   SpO2 95%   BMI 47.48 kg/m²   GENERAL: well developed, well nourished,in no apparent distress   here w      using a cane     R KNEE:   healed scar     tiny blister distal aspect     ASSESSMENT AND PLAN:   1. Acute pain of right knee  Discussed the knee and Dr Whitley visit      will refer to Dr Borja for 2nd opinion      - Ortho Referral - In Network    2. Gastroesophageal reflux disease without esophagitis  Discussed her meds     wt loss can help    3. Morbid obesity due to excess calories (HCC)  Discussed her weight    is stable but wt loss would help a lot of her s/s        The patient indicates understanding of these issues and agrees to the plan.  .

## 2024-01-22 ENCOUNTER — TELEPHONE (OUTPATIENT)
Dept: ORTHOPEDICS CLINIC | Facility: CLINIC | Age: 74
End: 2024-01-22

## 2024-01-22 DIAGNOSIS — M25.561 RIGHT KNEE PAIN, UNSPECIFIED CHRONICITY: Primary | ICD-10-CM

## 2024-01-22 NOTE — TELEPHONE ENCOUNTER
Patient has an upcoming appt for right knee . At this time patient has not had any imaging done, please place RX accordingly. I have notified the patient to come in 20-30 min prior to appointment time to have the imaging done . Please forward to the  pool to schedule imaging.   Thank you.      Future Appointments   Date Time Provider Department Center   1/30/2024  9:40 AM Iam Garcia PA-C [facility]  [facility]

## 2024-01-22 NOTE — TELEPHONE ENCOUNTER
X-Ray ordered. Please schedule with the patient. Thank you    - no previous images within last 3 months.

## 2024-01-22 NOTE — TELEPHONE ENCOUNTER
Please disregard message below.    Imaging was completed for this patient for a right knee, but it needs to be reviewed to see if additional imaging is needed. If so, please enter the appropriate RX and let the patient know to come in before their appointment to complete the additional imaging. Thank you!    Future Appointments   Date Time Provider Department Center   1/30/2024  9:40 AM Iam Garcia PA-C [facility]  [facility]

## 2024-01-24 DIAGNOSIS — E11.42 TYPE 2 DIABETES MELLITUS WITH DIABETIC POLYNEUROPATHY, WITHOUT LONG-TERM CURRENT USE OF INSULIN (HCC): Primary | ICD-10-CM

## 2024-01-24 RX ORDER — PREGABALIN 150 MG/1
150 CAPSULE ORAL 2 TIMES DAILY
Qty: 180 CAPSULE | Refills: 3 | Status: SHIPPED | OUTPATIENT
Start: 2024-01-24

## 2024-01-24 NOTE — TELEPHONE ENCOUNTER
Requesting    Name from pharmacy: PREGABALIN 150MG CAPSULES         Will file in chart as: PREGABALIN 150 MG Oral Cap    Sig: TAKE 1 CAPSULE(150 MG) BY MOUTH TWICE DAILY    Disp: 180 capsule    Refills: 0 (Pharmacy requested: Not specified)    Start: 1/24/2024    Class: Normal    Non-formulary    Last ordered: 3 months ago (10/2/2023) by Cynthia Nelson MD    Last refill: 10/2/2023     LOV: 1/20/2024  RTC: none noted   Last Relevant Labs: n/a   Filled: 10/2/2023 #180 with 0 refills    Future Appointments   Date Time Provider Department Center   1/30/2024  9:40 AM Iam Garcia PA-C [facility]

## 2024-01-29 DIAGNOSIS — E11.42 TYPE 2 DIABETES MELLITUS WITH DIABETIC POLYNEUROPATHY, WITHOUT LONG-TERM CURRENT USE OF INSULIN (HCC): ICD-10-CM

## 2024-01-29 RX ORDER — TOPIRAMATE 50 MG/1
50 TABLET, FILM COATED ORAL 2 TIMES DAILY
Qty: 180 TABLET | Refills: 0 | Status: SHIPPED | OUTPATIENT
Start: 2024-01-29

## 2024-01-29 RX ORDER — SPIRONOLACTONE 25 MG/1
25 TABLET ORAL DAILY
Qty: 90 TABLET | Refills: 0 | Status: SHIPPED | OUTPATIENT
Start: 2024-01-29

## 2024-01-29 NOTE — TELEPHONE ENCOUNTER
Requesting    Name from pharmacy: SPIRONOLACTONE 25MG TABLETS         Will file in chart as: SPIRONOLACTONE 25 MG Oral Tab    Sig: TAKE 1 TABLET(25 MG) BY MOUTH DAILY    Disp: 90 tablet    Refills: 0 (Pharmacy requested: Not specified)    Start: 1/29/2024    Class: Normal    Non-formulary    Last ordered: 2 months ago (11/6/2023) by Cynthia Nelson MD    Last refill: 11/6/2023    Rx #: [id number]    Hypertension Medications Protocol Nptiub1701/29/2024 05:56 AM   Protocol Details CMP or BMP in past 12 months    Last serum creatinine< 2.0    Appointment in past 6 or next 3 months       Name from pharmacy: TOPIRAMATE 50MG TABLETS         Will file in chart as: TOPIRAMATE 50 MG Oral Tab    Sig: TAKE 1 TABLET(50 MG) BY MOUTH TWICE DAILY    Disp: 180 tablet    Refills: 0 (Pharmacy requested: Not specified)    Start: 1/29/2024    Class: Normal    Non-formulary    Last ordered: 2 months ago (11/6/2023) by Cynthia Nelson MD    Last refill: 11/6/2023    Rx #: [id number]        Name from pharmacy: METFORMIN 1000MG TABLETS         Will file in chart as: METFORMIN HCL 1000 MG Oral Tab    Sig: TAKE 1 TABLET(1000 MG) BY MOUTH TWICE DAILY    Disp: 180 tablet    Refills: 0 (Pharmacy requested: Not specified)    Start: 1/29/2024    Class: Normal    Non-formulary For: Type 2 diabetes mellitus with diabetic polyneuropathy, without long-term current use of insulin (HCC)    Last ordered: 2 months ago (11/6/2023) by Cynthia Nelson MD    Last refill: 11/6/2023    Rx #: [id number]    Diabetic Medication Protocol [id number]/29/2024 05:56 AM   Protocol Details HgBA1C procedure resulted in past 6 months    Last HgBA1C < 7.5    Microalbumin procedure in past 12 months or taking ACE/ARB    Appointment in past 6 or next 3 months        LOV: 1/20/2024  RTC: none noted   Last Relevant Labs: A1c-12/16/2023;CMP-11/9/2023  Filled: 11/6/2023 #90 day  with 0 refills    Future Appointments   Date Time Provider Department Center    1/30/2024  9:40 AM Iam Garcia PA-C [facility]  [facility]

## 2024-01-30 ENCOUNTER — OFFICE VISIT (OUTPATIENT)
Facility: CLINIC | Age: 74
End: 2024-01-30
Payer: MEDICARE

## 2024-01-30 ENCOUNTER — HOSPITAL ENCOUNTER (OUTPATIENT)
Dept: GENERAL RADIOLOGY | Age: 74
End: 2024-01-30
Attending: PHYSICIAN ASSISTANT
Payer: MEDICARE

## 2024-01-30 ENCOUNTER — HOSPITAL ENCOUNTER (OUTPATIENT)
Dept: GENERAL RADIOLOGY | Age: 74
Discharge: HOME | End: 2024-01-30
Attending: PHYSICIAN ASSISTANT
Payer: MEDICARE

## 2024-01-30 VITALS — WEIGHT: 219 LBS | BODY MASS INDEX: 45.97 KG/M2 | HEIGHT: 58 IN

## 2024-01-30 DIAGNOSIS — G89.29 CHRONIC PAIN OF RIGHT KNEE: ICD-10-CM

## 2024-01-30 DIAGNOSIS — Z96.651 STATUS POST RIGHT KNEE REPLACEMENT: Primary | ICD-10-CM

## 2024-01-30 DIAGNOSIS — M25.561 CHRONIC PAIN OF RIGHT KNEE: ICD-10-CM

## 2024-01-30 DIAGNOSIS — M25.561 RIGHT KNEE PAIN, UNSPECIFIED CHRONICITY: ICD-10-CM

## 2024-01-30 PROCEDURE — 73562 X-RAY EXAM OF KNEE 3: CPT | Performed by: PHYSICIAN ASSISTANT

## 2024-01-30 PROCEDURE — 99203 OFFICE O/P NEW LOW 30 MIN: CPT | Performed by: PHYSICIAN ASSISTANT

## 2024-01-30 NOTE — H&P
[facility] New Patient Note    CC:   Chief Complaint   Patient presents with    Knee Pain     Right knee pain starting about 6-7 months ago . Patient notes this is a second opinion in regards to getting replacement      Knee previously replaced about 13 years ago      HPI: This 73 year old female presents today with complaints of right knee pain.  Patient underwent right total knee arthroplasty with Dr. Gonsalez in 2013.  She reports undergoing an infection after surgery, and Dr. Flor had recommended revision surgery at the time but the patient declined and reports the infection resolved with antibiotics.  She is unsure if the infection was exclusively to the skin or if it was of the actual components.  She reports about 6 to 7 months ago she insidiously began developing pain along the anteromedial aspect of the right knee.  Pain worsened with increased activity.  She had previously seen Dr. Zak Dai who recommended optimizing body mass index prior to undergoing revision surgery.  The patient has been taking Tylenol with codeine and meloxicam as needed for pain control without any significant relief.  She has tried physical therapy as well.  She had an ultrasound-guided aspiration of the right knee performed on 12/18/2023 which failed to yield any bacterial growth.    Most recent CRP on 12/1/2023 0.80 and ESR 26.    Past Medical History:   Diagnosis Date    Abdominal hernia     Anxiety     Arthritis     Back pain     Blurred vision     Change in hair     Colitis     Depression     DIABETES     Diabetes mellitus (HCC)     Esophageal reflux     Essential hypertension, benign 04/12/2013    Food intolerance     Heartburn     Indigestion     Lipid screening 04/25/2012    Low blood potassium     potassium is going up and down     Measles without mention of complication     Mumps without mention of complication     Night sweats     Osteoarthrosis, unspecified whether generalized or localized, unspecified site      Other and unspecified hyperlipidemia     OTHER DISEASES     bronchitis    Pain in joints     Pain with bowel movements     Pure hypercholesterolemia 2013    Type II or unspecified type diabetes mellitus without mention of complication, not stated as uncontrolled     Unspecified essential hypertension     Varicella without mention of complication     Visual impairment     Wears glasses      Past Surgical History:   Procedure Laterality Date    BACK SURGERY                  x3    COLONOSCOPY  2012    rck 10 yrs    COLONOSCOPY N/A 2022    Procedure: COLONOSCOPY,   ESOPHAGOGASTRODUODENOSCOPY with biopsies;  Surgeon: Gerri Upton DO;  Location:  ENDOSCOPY    HEMORRHOIDECTOMY      HEMORRHOIDECTOMY,INT/EXT,SIMPLE      HERNIA SURGERY      HERNIA SURGERY      abdominal hernia repair    KNEE REPLACEMENT SURGERY  2010, 2010    lt in 2010, rt in 2010    LAMINECTOMY,LUMBAR      L4 L5    OTHER SURGICAL HISTORY      anal fistulectomy     Current Outpatient Medications   Medication Sig Dispense Refill    spironolactone 25 MG Oral Tab Take 1 tablet (25 mg total) by mouth daily. 90 tablet 0    topiramate 50 MG Oral Tab Take 1 tablet (50 mg total) by mouth 2 (two) times daily. 180 tablet 0    metFORMIN HCl 1000 MG Oral Tab Take 1 tablet (1,000 mg total) by mouth 2 (two) times daily. 180 tablet 0    pregabalin 150 MG Oral Cap Take 1 capsule (150 mg total) by mouth 2 (two) times daily. 180 capsule 3    pravastatin 20 MG Oral Tab Take 1 tablet (20 mg total) by mouth every evening. 90 tablet 0    dicyclomine 10 MG Oral Cap Take 1 capsule (10 mg total) by mouth nightly as needed. 120 capsule 0    Meloxicam 7.5 MG Oral Tab Take 1 tablet (7.5 mg total) by mouth daily. 30 tablet 1    acetaminophen-codeine 300-30 MG Oral Tab Take 1 tablet by mouth every 4 (four) hours as needed. 50 tablet 0    empagliflozin (JARDIANCE) 10 MG Oral Tab Take 1 tablet (10 mg total) by mouth daily. 90 tablet 1     nortriptyline 25 MG Oral Cap TAKE 1 CAPSULE BY MOUTH EVERY NIGHT AT BEDTIME 90 capsule 0    TOPIRAMATE 25 MG Oral Tab TAKE 1 TABLET(25 MG) BY MOUTH TWICE DAILY 180 tablet 1    ondansetron 8 MG Oral Tablet Dispersible Take 1 tablet (8 mg total) by mouth 3 (three) times daily as needed.      Ciclopirox 8 % External Solution apply to affected toenails at bedtime for 3 months 1 each 2    FAMOTIDINE 40 MG Oral Tab TAKE 1 TABLET(40 MG) BY MOUTH EVERY NIGHT 90 tablet 3    HYDROCHLOROTHIAZIDE OR       omeprazole 20 MG Oral Capsule Delayed Release Take 1 capsule (20 mg total) by mouth in the morning and 1 capsule (20 mg total) in the evening. Take before meals. 180 capsule 3    Ursodiol 500 MG Oral Tab       ursodiol 300 MG Oral Cap Take 1 capsule (300 mg total) by mouth 3 (three) times daily. Patient takes:  1.5 tabs in morning  1 tab in afternoon  1 tab at night      Blood Glucose Monitoring Suppl (ACCU-CHEK GUIDE) w/Device Does not apply Kit 1 kit by Other route daily. 1 kit 0    Glucose Blood (ACCU-CHEK GUIDE) In Vitro Strip 1 strip by In Vitro route daily. 100 strip 0    OneTouch Delica Lancets 33G Does not apply Misc USE DAILY AS DIRECTED 100 each 0    ONETOUCH ULTRA BLUE In Vitro Strip TEST SUGAR DAILY 100 strip 0    aspirin (ASPIR-81) 81 MG Oral Tab EC Take 1 tablet (81 mg total) by mouth daily. 1 tablet 0     Allergies   Allergen Reactions    Fish-Derived Products ITCHING and SWELLING    Shellfish ITCHING and SWELLING    Zithromax RASH and SWELLING     TABS    Compound Iodine Solution [Iodine (Topical)] UNKNOWN     Family History   Problem Relation Age of Onset    Hypertension Mother     Lipids Mother     Cancer Maternal Grandmother     Cancer Maternal Grandfather     Cancer Paternal Grandmother     Breast Cancer Maternal Aunt 60    Prostate Cancer Other     Bone Cancer Other     Heart Disease Neg     Stroke Neg      Social History     Occupational History    Not on file   Tobacco Use    Smoking status: Former      Packs/day: 0.30     Years: 12.00     Additional pack years: 0.00     Total pack years: 3.60     Types: Cigarettes     Quit date: 2015     Years since quittin.0    Smokeless tobacco: Never   Vaping Use    Vaping Use: Never used   Substance and Sexual Activity    Alcohol use: No     Alcohol/week: 0.0 standard drinks of alcohol    Drug use: No    Sexual activity: Not on file        ROS:  Comprehensive system review obtained and negative except as mentioned above      Physical Exam:    Ht 4' 10\" (1.473 m)   Wt 219 lb (99.3 kg)   BMI 45.77 kg/m²   Constitutional: Awake, alert, no distress.  Seated in wheelchair.  Psychological: Appropriate affect.  Respiratory: Unlabored breathing.  Right lower extremity:  Inspection: skin is intact without any redness, deformity, or effusion.   Palpation: No significant discomfort to palpation along the medial or lateral joint lines.  Range of motion: Knee can fully extend to 0 degrees and flex to approximately 90 degrees.  Knee is stable to valgus and varus stress at 0 and 30 degrees. No laxity with anterior or posterior drawer.  Knee feels loose on valgus testing with no hard endpoint.  Neuromuscular: Strength is normal and sensation is intact.  Vascular: Extremities are warm and well-perfused.  Lymph: Unremarkable.      Imaging: Imaging was personally viewed, independently interpreted and radiology report read.  Radiographs of the right knee obtained today demonstrates loss of bone stock along the anterior aspect of the distal femur as well as loosening between the posterior tibial bone cement interface.     Assessment/Plan:  Diagnoses and all orders for this visit:    Status post right knee replacement  -     Pain Management Referral - In Network    Chronic pain of right knee  -     Pain Management Referral - In Network      Assessment: 73-year-old female with painful right knee replacement    Plan: Discussed with the patient the role of revision surgery as well as the  extensive revision necessary including bone augmentation given the loss of bone stock.  Given the patient's significant thigh circumference, would recommend against surgical intervention due to the technical difficulties with performing the surgery as well as the high risk of infection postoperatively.  Did discuss the role of nonsurgical intervention including possible use of radiofrequency ablation of the right knee.  I provided the patient with information to Dr. Rivera's clinic if she were interested.  I also provided the patient with a handout for [facility] weight management to assist with weight reduction.  I invited her to return to our clinic if she feels like she would like to be reassessed for possible revision surgery in the future.  The patient and her 's questions were sought and answered satisfactorily.  They are happy with the plan and will follow as advised.       Iam Garcia PA-C  [facility]    This note was dictated using Dragon software.  While it was briefly proofread prior to completion, some grammatical, spelling, and word choice errors due to dictation may still occur.

## 2024-02-01 DIAGNOSIS — M96.1 POSTLAMINECTOMY SYNDROME, LUMBAR REGION: Primary | ICD-10-CM

## 2024-02-01 RX ORDER — ACETAMINOPHEN AND CODEINE PHOSPHATE 300; 30 MG/1; MG/1
1 TABLET ORAL EVERY 4 HOURS PRN
Qty: 50 TABLET | Refills: 0 | Status: SHIPPED | OUTPATIENT
Start: 2024-02-01

## 2024-02-01 NOTE — TELEPHONE ENCOUNTER
Requesting    Name from pharmacy: ACETAMINOPHEN/COD #3 (300/30MG) TAB         Will file in chart as: ACETAMINOPHEN-CODEINE 300-30 MG Oral Tab    Sig: TAKE 1 TABLET BY MOUTH EVERY 4 HOURS AS NEEDED    Disp: 50 tablet    Refills: 0 (Pharmacy requested: Not specified)    Start: 2/1/2024    Class: Normal    Non-formulary    Last ordered: 1 month ago (12/30/2023) by Cynthia Nelson MD    Last refill: 12/30/2023     LOV: 1/20/2024  RTC: n/a   Last Relevant Labs: n/a   Filled: 12/30/2023 #50 with 0 refills    Future Appointments   Date Time Provider Department Center   2/5/2024 11:30 AM Minesh Reis MD [facility] [place]

## 2024-02-02 RX ORDER — DICYCLOMINE HYDROCHLORIDE 10 MG/1
10 CAPSULE ORAL NIGHTLY PRN
Qty: 90 CAPSULE | Refills: 0 | OUTPATIENT
Start: 2024-02-02

## 2024-02-21 ENCOUNTER — OFFICE VISIT (OUTPATIENT)
Dept: PAIN CLINIC | Facility: CLINIC | Age: 74
End: 2024-02-21
Payer: MEDICARE

## 2024-02-21 ENCOUNTER — TELEPHONE (OUTPATIENT)
Dept: PAIN CLINIC | Facility: CLINIC | Age: 74
End: 2024-02-21

## 2024-02-21 VITALS — DIASTOLIC BLOOD PRESSURE: 76 MMHG | SYSTOLIC BLOOD PRESSURE: 122 MMHG

## 2024-02-21 DIAGNOSIS — M25.561 CHRONIC PAIN OF RIGHT KNEE: Primary | ICD-10-CM

## 2024-02-21 DIAGNOSIS — G89.29 CHRONIC PAIN OF RIGHT KNEE: Primary | ICD-10-CM

## 2024-02-21 PROCEDURE — 99204 OFFICE O/P NEW MOD 45 MIN: CPT | Performed by: ANESTHESIOLOGY

## 2024-02-21 NOTE — PATIENT INSTRUCTIONS
Refill policies:    Allow 2-3 business days for refills; controlled substances may take longer.  Contact your pharmacy at least 5 days prior to running out of medication and have them send an electronic request or submit request through the “request refill” option in your "[employer] account.  Refills are not addressed on weekends; covering physicians do not authorize routine medications on weekends.  No narcotics or controlled substances are refilled after noon on Fridays or by on call physicians.  By law, narcotics must be electronically prescribed.  A 30 day supply with no refills is the maximum allowed.  If your prescription is due for a refill, you may be due for a follow up appointment.  To best provide you care, patients receiving routine medications need to be seen at least once a year.  Patients receiving narcotic/controlled substance medications need to be seen at least once every 3 months.  In the event that your preferred pharmacy does not have the requested medication in stock (e.g. Backordered), it is your responsibility to find another pharmacy that has the requested medication available.  We will gladly send a new prescription to that pharmacy at your request.    Scheduling Tests:    If your physician has ordered radiology tests such as MRI or CT scans, please contact Central Scheduling at [phone] right away to schedule the test.  Once scheduled, the [facility] Centralized Referral Team will work with your insurance carrier to obtain pre-certification or prior authorization.  Depending on your insurance carrier, approval may take 3-10 days.  It is highly recommended patients assure they have received an authorization before having a test performed.  If test is done without insurance authorization, patient may be responsible for the entire amount billed.      Precertification and Prior Authorizations:  If your physician has recommended that you have a procedure or additional testing performed the [facility]  Centralized Referral Team will contact your insurance carrier to obtain pre-certification or prior authorization.    You are strongly encouraged to contact your insurance carrier to verify that your procedure/test has been approved and is a COVERED benefit.  Although the [facility] Centralized Referral Team does its due diligence, the insurance carrier gives the disclaimer that \"Although the procedure is authorized, this does not guarantee payment.\"    Ultimately the patient is responsible for payment.   Thank you for your understanding in this matter.  Paperwork Completion:  If you require FMLA or disability paperwork for your recovery, please make sure to either drop it off or have it faxed to our office at [phone]. Be sure the form has your name and date of birth on it.  The form will be faxed to our Forms Department and they will complete it for you.  There is a 25$ fee for all forms that need to be filled out.  Please be aware there is a 10-14 day turnaround time.  You will need to sign a release of information (JAME) form if your paperwork does not come with one.  You may call the Forms Department with any questions at [phone].  Their fax number is [phone].

## 2024-02-21 NOTE — TELEPHONE ENCOUNTER
Patient advised of insurance approval to proceed with injections and is agreeable to scheduling. Patient scheduled for procedure, pre-procedure instructions reviewed. Patient prefers conscious  sedation. Reviewed sedation instructions including Fasting 8 hours prior, Required and No Covid Test Required. Patient encouraged but not required to hold Meloxicam for 24 hours prior to procedure. Advised patient that she can take blood pressure medication w/small sip of water. Patient is on Metformin for diabetes - advised patient to monitor glucose levels and adjust medication if needed. Patient verbalized understanding of instructions, no further needs at this time.    Pre Procedure Instruction Sheet provided to patient at office visit.     [facility]  PRE-PROCEDURE INSTRUCTIONS WITH IV SEDATION     Procedure: Right Genicular Nerve Block     Appointment Date: 02/29/2024   Check-In Time: 12:00 PM     Follow-Up Date/Time with  : 03/13/2024 @ 10:15 AM    Prior to the procedure:  Please update us prior to the procedure if you are experiencing any symptoms of infection such as cough, fever, chills, urinary symptoms, or have recently been prescribed antibiotics, have open wounds, have recently had surgery or dental procedures.    Day of Procedure:  Do not eat or drink anything (including water) 8 hours prior to your procedure.  If you take morning blood pressure medication or oral diabetic medication, please take with a small sip of water.  You are required to have a responsible adult drive you home after your procedure. You may not take a cab or ride share unless you have a responsible adult with you in the cab or ride share.  A family member or friend is required to stay in our waiting room or hospital garage because of the sedation you will receive, you may be sleepy and forgetful. You may not remember anything told to you after your procedure including discharge instructions. Please note:  children are not allowed in the holding area so please make appropriate arrangements.  Any additional family members and friends will need to remain in the surgical waiting room or hospital garage for the duration of your procedure. *If your family member or friend elects to wait in the garage, they must leave a cell phone number with the lab staff in case they need to be reached.  [facility] and follow the signs to the [facility].  Please bring your Insurance Card, Photo ID, List of Current Medications and Referral (if applicable) to your appointment. Check in at [address]) outpatient registration in the [facility].  Please note-No prescriptions will be written by Pain Clinic in OR on the day of procedure. If you require a refill of medications, please contact the office 48 hours prior to your procedure.  If you have an implanted Spinal Cord or Peripheral Nerve Stimulator: Please remember to turn device off for procedure    *If you are fasting, you may take blood pressure and thyroid medications with a small sip of water the day of your procedure.   *If you are diabetic, your glucose must be within a normal range for you. If you are fasting, you should check your glucose levels and adjust with medication if needed.    Medication Hold:  Number of days you need to be off for the following medications:    Aggrenox 10 days   Agrylin (Anagrelide) 10 days  Brilinta (Ticagrelor) 7 days  Imbruvica (Ibrutinib) 3 days   Enbrel (Etanercept) 24 hours   Fragmin (Dalteparin) 24 hours   Pletal (Cilostazol) 7 days  Effient (Prasugrel) 7 days  Pradaxa 10 days  Trental 7 days  Eliquis (Apixaban) 3 days  Xarelto (Rivaroxaban) 3 days  Lovenox (Enoxaparin) 24 hours  Aspirin  Greater than 81mg but less than 325mg   5 days  325mg and greater                  7 days  (*81 mg      24 hours preferred, but not required)  Coumadin       5 days  Procedure may be  cancelled if INR is elevated.   Excedrin (with aspirin) 7 days  Plavix (Clopidogrel)                             7 days    NSAIDs: 24 hours preferred, but not required      Ibuprofen (Motrin, Advil, Vicoprofen), Naproxen (Naprosyn, Aleve), Piroxcam (Feldene), Meloxicam (Mobic), Oxaprozin (Daypro), Diclofenac (Voltaren), Indomethacin (Indocin), Etodolac (Lodine), Nabumetone (Relafen), Celebrex (Celecoxib)           HERBAL SUPPLEMENTS  5 days preferred, but not required  Fish oil, krill oil, Omega-3, Vascepa, Vitamin E, Turmeric, Garlic                       Insurance Authorization:   Most insurances are now requiring a preauthorization for all procedures.     Please contact your insurance carrier to determine what your financial responsibility will be for the procedure(s).    Cancellation/Rescheduling Appointment:   In the event you need to cancel or reschedule your appointment, you must notify the office 24 hours prior.    Post-procedure instructions:        Please schedule a follow up visit within 2 to 4 weeks after your last procedure date   Please call our office with any questions or concerns before or after your procedure at [phone], #[phone].  If you are a diabetic, please increase the frequency of your glucose monitoring after the procedure as this may cause a temporary increase in your blood sugar. Contact your primary care physician if your blood sugar rises as you may require some medication adjustment.        It is normal to have increased pain at injection site for up to 3-5 days after procedure, you can        use heat or ice (20 minutes on 20 minutes off) for comfort.

## 2024-02-21 NOTE — H&P
Name: Felicia Velez   : 1950   DOS: 2024     Chief complaint: Right knee pain    History of present illness:  Felicia Velez is a 73 year old female with a history of diabetes, hypertension, and hyperlipidemia who presents today for evaluation of chronic right knee pain.  The patient had right total knee arthroplasty in .  She reports an infection after surgery.  Revision surgery was recommended but patient declined and infection resolved with antibiotics.  She began pain in the medial aspect of the right knee approximately 1 year ago.  The patient was seen by Dr. Dai and had ultrasound-guided aspiration of the right knee.  This did not show any bacterial growth.  Her infectious workup has also been negative.  Patient has been mainly managing symptoms with meloxicam and Tylenol with codeine.  Patient was informed that due to her body habitus, and loss of distal bone, that any revision surgery would be both technically challenging and at high risk of infection.  Patient referred to discuss genicular nerve block and possible radiofrequency ablation.    She denies any chills, fever or weakness. She denies any bladder or bowel incontinence.      Past Medical History:   Diagnosis Date    Abdominal hernia     Anxiety     Arthritis     Back pain     Blurred vision     Change in hair     Colitis     Depression     DIABETES     Diabetes mellitus (HCC)     Esophageal reflux     Essential hypertension, benign 2013    Food intolerance     Heartburn     Indigestion     Lipid screening 2012    Low blood potassium     potassium is going up and down     Measles without mention of complication     Mumps without mention of complication     Night sweats     Osteoarthrosis, unspecified whether generalized or localized, unspecified site     Other and unspecified hyperlipidemia     OTHER DISEASES     bronchitis    Pain in joints     Pain with bowel movements     Pure hypercholesterolemia 2013     Type II or unspecified type diabetes mellitus without mention of complication, not stated as uncontrolled     Unspecified essential hypertension     Varicella without mention of complication     Visual impairment     Wears glasses       Current Outpatient Medications   Medication Sig Dispense Refill    acetaminophen-codeine 300-30 MG Oral Tab Take 1 tablet by mouth every 4 (four) hours as needed. 50 tablet 0    spironolactone 25 MG Oral Tab Take 1 tablet (25 mg total) by mouth daily. 90 tablet 0    topiramate 50 MG Oral Tab Take 1 tablet (50 mg total) by mouth 2 (two) times daily. 180 tablet 0    metFORMIN HCl 1000 MG Oral Tab Take 1 tablet (1,000 mg total) by mouth 2 (two) times daily. 180 tablet 0    pregabalin 150 MG Oral Cap Take 1 capsule (150 mg total) by mouth 2 (two) times daily. 180 capsule 3    pravastatin 20 MG Oral Tab Take 1 tablet (20 mg total) by mouth every evening. 90 tablet 0    dicyclomine 10 MG Oral Cap Take 1 capsule (10 mg total) by mouth nightly as needed. 120 capsule 0    Meloxicam 7.5 MG Oral Tab Take 1 tablet (7.5 mg total) by mouth daily. 30 tablet 1    empagliflozin (JARDIANCE) 10 MG Oral Tab Take 1 tablet (10 mg total) by mouth daily. 90 tablet 1    nortriptyline 25 MG Oral Cap TAKE 1 CAPSULE BY MOUTH EVERY NIGHT AT BEDTIME 90 capsule 0    TOPIRAMATE 25 MG Oral Tab TAKE 1 TABLET(25 MG) BY MOUTH TWICE DAILY 180 tablet 1    ondansetron 8 MG Oral Tablet Dispersible Take 1 tablet (8 mg total) by mouth 3 (three) times daily as needed.      Ciclopirox 8 % External Solution apply to affected toenails at bedtime for 3 months 1 each 2    FAMOTIDINE 40 MG Oral Tab TAKE 1 TABLET(40 MG) BY MOUTH EVERY NIGHT 90 tablet 3    HYDROCHLOROTHIAZIDE OR       omeprazole 20 MG Oral Capsule Delayed Release Take 1 capsule (20 mg total) by mouth in the morning and 1 capsule (20 mg total) in the evening. Take before meals. 180 capsule 3    Ursodiol 500 MG Oral Tab       ursodiol 300 MG Oral Cap Take 1 capsule  (300 mg total) by mouth 3 (three) times daily. Patient takes:  1.5 tabs in morning  1 tab in afternoon  1 tab at night      Blood Glucose Monitoring Suppl (ACCU-CHEK GUIDE) w/Device Does not apply Kit 1 kit by Other route daily. 1 kit 0    Glucose Blood (ACCU-CHEK GUIDE) In Vitro Strip 1 strip by In Vitro route daily. 100 strip 0    OneTouch Delica Lancets 33G Does not apply Misc USE DAILY AS DIRECTED 100 each 0    ONETOUCH ULTRA BLUE In Vitro Strip TEST SUGAR DAILY 100 strip 0    aspirin (ASPIR-81) 81 MG Oral Tab EC Take 1 tablet (81 mg total) by mouth daily. 1 tablet 0     Past Surgical History:   Procedure Laterality Date    BACK SURGERY                  x3    COLONOSCOPY  2012    rck 10 yrs    COLONOSCOPY N/A 2022    Procedure: COLONOSCOPY,   ESOPHAGOGASTRODUODENOSCOPY with biopsies;  Surgeon: Gerri Upton DO;  Location:  ENDOSCOPY    HEMORRHOIDECTOMY      HEMORRHOIDECTOMY,INT/EXT,SIMPLE      HERNIA SURGERY      HERNIA SURGERY      abdominal hernia repair    KNEE REPLACEMENT SURGERY  2010, 2010    lt in 2010, rt in 2010    LAMINECTOMY,LUMBAR      L4 L5    OTHER SURGICAL HISTORY      anal fistulectomy      Family History   Problem Relation Age of Onset    Hypertension Mother     Lipids Mother     Cancer Maternal Grandmother     Cancer Maternal Grandfather     Cancer Paternal Grandmother     Breast Cancer Maternal Aunt 60    Prostate Cancer Other     Bone Cancer Other     Heart Disease Neg     Stroke Neg      Social History     Socioeconomic History    Marital status:    Tobacco Use    Smoking status: Former     Packs/day: 0.30     Years: 12.00     Additional pack years: 0.00     Total pack years: 3.60     Types: Cigarettes     Quit date: 2015     Years since quittin.1    Smokeless tobacco: Never   Vaping Use    Vaping Use: Never used   Substance and Sexual Activity    Alcohol use: No     Alcohol/week: 0.0 standard drinks of alcohol    Drug use: No    Other Topics Concern    Caffeine Concern Yes     Comment: 0-1 cup daily.     Stress Concern Yes    Weight Concern Yes    Special Diet Yes     Comment: diabetic diet     Exercise No    Seat Belt Yes       Review of  other systems:  10 point ROS otherwise negative    Physical examination: Felicia is a 73 year old female not in acute distress  /76 (BP Location: Left arm, Patient Position: Sitting)    The patient is awake, alert, oriented and corporative. She has a normal affect. The patient is resting in wheelchair HEENT: No gross lesion noted. PEERL. No icterus.  Neck and Upper Extremity: Supple. No thyromegaly or lymphadenopathy.   Motor Examination:    (R)   (L)  Deltoid:      5    5  Biceps:       5    5  Triceps:      5    5  Wrist Extension:     5    5  Wrist Flexsion:     5    5  Finger Extension:     5    5  Finger Flexsion:     5    5       Cardiovascular system: Regular rate and rhythm   Respiratory system: Speech nonlabored.  Abdomen: Soft, nontender   Neurologic:  Cranial nerves II through XII are grossly intact.       Knee: Well-healed surgical scar.  No effusion or redness.  Mild tenderness to palpation along the medial aspect.    Low back: Lower extremity range of motion intact        Radiology diagnostic studies:   Knee x-ray reviewed with total knee arthroplasty in stable alignment    Assessment:  Encounter Diagnosis   Name Primary?    Chronic pain of right knee Yes   .      Plan:     The patient is a 73-year-old female who presents today for evaluation of chronic knee pain.  The patient has a history of right total knee arthroplasty completed in 2013.  This was impacted by infection which did resolve with antibiotics.  However, has been dealing with chronic knee pain for over a year.  Infectious workup has been negative.  Patient not a candidate for revision total knee due to technical challenges along with body habitus.  Therefore, discussed genicular nerve block.  If patient has good relief  from genicular nerve block, may be a candidate for radiofrequency ablation.    Minesh Reis MD MPH  Pain Management

## 2024-02-21 NOTE — PROGRESS NOTES
Location of Pain: right knee, back of leg    Date Pain Began: 8/1/23          Work Related:   No        Receiving Work Comp/Disability:   No    Numeric Rating Scale:  Pain at Present:  7                                                                                                            (No Pain) 0  to  10 (Worst Pain)                 Minimum Pain:   2  Maximum Pain  10    Distribution of Pain:    right    Quality of Pain:    throbbing shooting sharp cramping    Origin of Pain:    Surgical complications    Aggravating Factors:    Standing and Walking    Past Treatments for Current Pain Condition:   Other Tylenol 3, meloxicam, pregablin    Prior diagnostic testing for your pain:  Xray

## 2024-02-21 NOTE — TELEPHONE ENCOUNTER
Order Questions    Question Answer   Anesthesia Type Sedation   Provider [facility]   CPT (Hit enter after each entry) NJX AA&/STRD [facility]   Medical clearance requested (will send to Pain Navigator) No   Patient has Medicare coverage? Yes   Comments (Please list entire procedure name here.) right genicular nerve block

## 2024-02-21 NOTE — PROGRESS NOTES
Patient presents in office today with reported pain in right knee    Current pain level reported = 7/10    Last reported dose of OTC 1 tylenol around 8a

## 2024-02-23 DIAGNOSIS — M25.561 ACUTE PAIN OF RIGHT KNEE: ICD-10-CM

## 2024-02-23 RX ORDER — MELOXICAM 7.5 MG/1
7.5 TABLET ORAL DAILY
Qty: 30 TABLET | Refills: 1 | Status: SHIPPED | OUTPATIENT
Start: 2024-02-23

## 2024-02-23 NOTE — TELEPHONE ENCOUNTER
Requesting    Name from pharmacy: MELOXICAM 7.5MG TABLETS         Will file in chart as: MELOXICAM 7.5 MG Oral Tab    Sig: TAKE 1 TABLET(7.5 MG) BY MOUTH DAILY    Disp: 30 tablet    Refills: 1 (Pharmacy requested: Not specified)    Start: 2/23/2024    Class: Normal    Non-formulary For: Acute pain of right knee    Last ordered: 1 month ago (12/30/2023) by Cynthia Nelson MD    Last refill: 1/29/2024    Rx #: [id number]    Non-Narcotic Pain Medication Protocol Bovhyw4202/23/2024 06:00 AM   Protocol Details In person appointment or virtual visit in the past 6 mos or appointment in next 3 mos        LOV: 1/20/2024  RTC: none noted   Last Relevant Labs: n/a   Filled: 12/30/2023 #30 with 1 refills    Future Appointments   Date Time Provider Department Center   3/13/2024 10:15 AM Minesh Reis MD [facility]

## 2024-02-29 ENCOUNTER — HOSPITAL ENCOUNTER (OUTPATIENT)
Facility: HOSPITAL | Age: 74
Setting detail: HOSPITAL OUTPATIENT SURGERY
Discharge: HOME | End: 2024-02-29
Attending: ANESTHESIOLOGY | Admitting: ANESTHESIOLOGY
Payer: MEDICARE

## 2024-02-29 ENCOUNTER — APPOINTMENT (OUTPATIENT)
Dept: GENERAL RADIOLOGY | Facility: HOSPITAL | Age: 74
End: 2024-02-29
Attending: ANESTHESIOLOGY
Payer: MEDICARE

## 2024-02-29 VITALS
TEMPERATURE: 97 F | SYSTOLIC BLOOD PRESSURE: 143 MMHG | HEIGHT: 58 IN | WEIGHT: 219 LBS | HEART RATE: 76 BPM | BODY MASS INDEX: 45.97 KG/M2 | DIASTOLIC BLOOD PRESSURE: 73 MMHG | OXYGEN SATURATION: 97 % | RESPIRATION RATE: 16 BRPM

## 2024-02-29 LAB
GLUCOSE BLD-MCNC: 58 MG/DL (ref 70–99)
GLUCOSE BLD-MCNC: 68 MG/DL (ref 70–99)

## 2024-02-29 PROCEDURE — 82962 GLUCOSE BLOOD TEST: CPT

## 2024-02-29 RX ORDER — SODIUM CHLORIDE, SODIUM LACTATE, POTASSIUM CHLORIDE, CALCIUM CHLORIDE 600; 310; 30; 20 MG/100ML; MG/100ML; MG/100ML; MG/100ML
100 INJECTION, SOLUTION INTRAVENOUS CONTINUOUS
Status: DISCONTINUED | OUTPATIENT
Start: 2024-02-29 | End: 2024-02-29

## 2024-02-29 RX ORDER — NICOTINE POLACRILEX 4 MG
30 LOZENGE BUCCAL
Status: DISCONTINUED | OUTPATIENT
Start: 2024-02-29 | End: 2024-02-29

## 2024-02-29 RX ORDER — ONDANSETRON 2 MG/ML
4 INJECTION INTRAMUSCULAR; INTRAVENOUS ONCE AS NEEDED
OUTPATIENT
Start: 2024-02-29 | End: 2024-02-29

## 2024-02-29 RX ORDER — ONDANSETRON 2 MG/ML
4 INJECTION INTRAMUSCULAR; INTRAVENOUS ONCE AS NEEDED
Status: DISCONTINUED | OUTPATIENT
Start: 2024-02-29 | End: 2024-02-29

## 2024-02-29 RX ORDER — NICOTINE POLACRILEX 4 MG
15 LOZENGE BUCCAL
Status: DISCONTINUED | OUTPATIENT
Start: 2024-02-29 | End: 2024-02-29

## 2024-02-29 RX ORDER — DEXTROSE MONOHYDRATE 25 G/50ML
INJECTION, SOLUTION INTRAVENOUS
Status: COMPLETED
Start: 2024-02-29 | End: 2024-02-29

## 2024-02-29 RX ORDER — ONDANSETRON 2 MG/ML
INJECTION INTRAMUSCULAR; INTRAVENOUS
Status: COMPLETED
Start: 2024-02-29 | End: 2024-02-29

## 2024-02-29 RX ORDER — DEXTROSE MONOHYDRATE 25 G/50ML
50 INJECTION, SOLUTION INTRAVENOUS
Status: DISCONTINUED | OUTPATIENT
Start: 2024-02-29 | End: 2024-02-29

## 2024-02-29 RX ORDER — DIPHENHYDRAMINE HYDROCHLORIDE 50 MG/ML
50 INJECTION INTRAMUSCULAR; INTRAVENOUS ONCE AS NEEDED
OUTPATIENT
Start: 2024-02-29 | End: 2024-02-29

## 2024-03-01 DIAGNOSIS — M96.1 POSTLAMINECTOMY SYNDROME, LUMBAR REGION: ICD-10-CM

## 2024-03-04 RX ORDER — ACETAMINOPHEN AND CODEINE PHOSPHATE 300; 30 MG/1; MG/1
1 TABLET ORAL EVERY 4 HOURS PRN
Qty: 50 TABLET | Refills: 0 | Status: SHIPPED | OUTPATIENT
Start: 2024-03-04

## 2024-03-04 NOTE — TELEPHONE ENCOUNTER
Requesting   Name from pharmacy: ACETAMINOPHEN/COD #3 (300/30MG) TAB          Will file in chart as: ACETAMINOPHEN-CODEINE 300-30 MG Oral Tab    Sig: TAKE 1 TABLET BY MOUTH EVERY 4 HOURS AS NEEDED    Disp: 50 tablet    Refills: 0 (Pharmacy requested: Not specified)    Start: 3/1/2024    Class: Normal    Non-formulary For: Postlaminectomy syndrome, lumbar region    Last ordered: 1 month ago (2/1/2024) by Cynthia Nelson MD    Last refill: 2/1/2024    Rx #: [id number]    Controlled Substance Medication Urvxay5903/01/2024 06:35 PM    This medication is a controlled substance - forward to provider to refill        LOV: 1/20/2024  RTC: none noted   Last Relevant Labs: n/a   Filled: 2/1/2024 #50 with 0 refills    Future Appointments   Date Time Provider Department Center   3/13/2024 10:15 AM Minesh Reis MD [facility]

## 2024-03-06 RX ORDER — NORTRIPTYLINE HYDROCHLORIDE 25 MG/1
CAPSULE ORAL
Qty: 90 CAPSULE | Refills: 0 | Status: SHIPPED | OUTPATIENT
Start: 2024-03-06

## 2024-03-06 NOTE — TELEPHONE ENCOUNTER
Nursing Note by Prosper Dow CMA at 10/20/17 02:17 PM     Author:  Prosper Dow CMA Service:  (none) Author Type:  Certified Medical Assistant     Filed:  10/20/17 02:18 PM Encounter Date:  10/20/2017 Status:  Signed     :  Prosper Dow CMA (Certified Medical Assistant)            Xolair 1.2mL/150mg given in[AB1.1T] bilateral[AB1.1M] arm via sub-q total of[AB1.1T] 300[AB1.1M]mg.  Waste:[AB1.1T] 0[AB1.1M]  NDC#:[AB1.1T] 04940-057-64[AB1.1M]  Lot#:[AB1.1T]  [id number][AB1.1M]  Exp Date:[AB1.1T]  April 2021[AB1.1M]     Patient tolerated injections well and was take back to observation area for[AB1.1T] 30[AB1.1M] min waiting period.[AB1.1T]        Revision History        User Key Date/Time User Provider Type Action    > AB1.1 10/20/17 02:18 PM Prosper Dow CMA Certified Medical Assistant Sign    M - Manual, T - Template             Requesting    Name from pharmacy: NORTRIPTYLINE 25MG CAPSULES         Will file in chart as: NORTRIPTYLINE 25 MG Oral Cap    Sig: TAKE 1 CAPSULE BY MOUTH EVERY NIGHT AT BEDTIME    Disp: 90 capsule    Refills: 0 (Pharmacy requested: Not specified)    Start: 3/6/2024    Class: Normal    Non-formulary    Last ordered: 7 months ago (7/26/2023) by Cynthia Nleson MD    Last refill: 11/29/2023    Rx #: [id number]     LOV: 1/20/2023  RTC: none noted   Last Relevant Labs: n/a   Filled: 11/29/2023 #90 with 0 refills    No future appointments.

## 2024-03-25 RX ORDER — PRAVASTATIN SODIUM 20 MG
20 TABLET ORAL EVERY EVENING
Qty: 90 TABLET | Refills: 0 | Status: SHIPPED | OUTPATIENT
Start: 2024-03-25

## 2024-03-25 NOTE — TELEPHONE ENCOUNTER
Requesting    Name from pharmacy: PRAVASTATIN 20MG TABLETS         Will file in chart as: PRAVASTATIN 20 MG Oral Tab    Sig: TAKE 1 TABLET(20 MG) BY MOUTH EVERY EVENING    Disp: 90 tablet    Refills: 0 (Pharmacy requested: Not specified)    Start: 3/24/2024    Class: Normal    Non-formulary    Last ordered: 2 months ago (12/30/2023) by Cynthia Nelson MD    Last refill: 12/30/2023    Rx #: [id number]    Cholesterol Medication Protocol [id number]/24/2024 05:58 AM   Protocol Details Lipid panel within past 12 months    ALT < 80    ALT resulted within past year    In person appointment or virtual visit in the past 12 mos or appointment in next 3 mos        LOV: 1/20/2024  RTC: none noted   Last Relevant Labs: 2/9/2023  Filled: 12/30/2023 #90 with 0 refills    Future Appointments   Date Time Provider Department Center   4/10/2024 10:00 AM Minesh Reis MD [facility]

## 2024-03-26 ENCOUNTER — HOSPITAL ENCOUNTER (OUTPATIENT)
Facility: HOSPITAL | Age: 74
Setting detail: HOSPITAL OUTPATIENT SURGERY
Discharge: HOME | End: 2024-03-26
Attending: ANESTHESIOLOGY | Admitting: ANESTHESIOLOGY
Payer: MEDICARE

## 2024-03-26 ENCOUNTER — APPOINTMENT (OUTPATIENT)
Dept: GENERAL RADIOLOGY | Facility: HOSPITAL | Age: 74
End: 2024-03-26
Attending: ANESTHESIOLOGY
Payer: MEDICARE

## 2024-03-26 VITALS
DIASTOLIC BLOOD PRESSURE: 74 MMHG | RESPIRATION RATE: 18 BRPM | HEIGHT: 58 IN | WEIGHT: 219 LBS | TEMPERATURE: 98 F | OXYGEN SATURATION: 97 % | HEART RATE: 80 BPM | SYSTOLIC BLOOD PRESSURE: 101 MMHG | BODY MASS INDEX: 45.97 KG/M2

## 2024-03-26 LAB — GLUCOSE BLD-MCNC: 62 MG/DL (ref 70–99)

## 2024-03-26 PROCEDURE — 64454 NJX AA&/STRD GNCLR NRV BRNCH: CPT | Performed by: ANESTHESIOLOGY

## 2024-03-26 PROCEDURE — 3E0T3BZ INTRODUCTION OF ANESTHETIC AGENT INTO PERIPHERAL NERVES AND PLEXI, PERCUTANEOUS APPROACH: ICD-10-PCS | Performed by: ANESTHESIOLOGY

## 2024-03-26 RX ORDER — ONDANSETRON 2 MG/ML
4 INJECTION INTRAMUSCULAR; INTRAVENOUS ONCE AS NEEDED
Status: DISCONTINUED | OUTPATIENT
Start: 2024-03-26 | End: 2024-03-26

## 2024-03-26 RX ORDER — METHYLPREDNISOLONE ACETATE 40 MG/ML
INJECTION, SUSPENSION INTRA-ARTICULAR; INTRALESIONAL; INTRAMUSCULAR; SOFT TISSUE
Status: DISCONTINUED | OUTPATIENT
Start: 2024-03-26 | End: 2024-03-26

## 2024-03-26 RX ORDER — BUPIVACAINE HYDROCHLORIDE 5 MG/ML
INJECTION, SOLUTION EPIDURAL; INTRACAUDAL
Status: DISCONTINUED | OUTPATIENT
Start: 2024-03-26 | End: 2024-03-26

## 2024-03-26 RX ORDER — DIPHENHYDRAMINE HYDROCHLORIDE 50 MG/ML
50 INJECTION INTRAMUSCULAR; INTRAVENOUS ONCE AS NEEDED
Status: DISCONTINUED | OUTPATIENT
Start: 2024-03-26 | End: 2024-03-26

## 2024-03-26 RX ORDER — MIDAZOLAM HYDROCHLORIDE 1 MG/ML
INJECTION INTRAMUSCULAR; INTRAVENOUS
Status: DISCONTINUED | OUTPATIENT
Start: 2024-03-26 | End: 2024-03-26

## 2024-03-26 RX ORDER — LIDOCAINE HYDROCHLORIDE 10 MG/ML
INJECTION, SOLUTION EPIDURAL; INFILTRATION; INTRACAUDAL; PERINEURAL
Status: DISCONTINUED | OUTPATIENT
Start: 2024-03-26 | End: 2024-03-26

## 2024-03-26 RX ORDER — SODIUM CHLORIDE, SODIUM LACTATE, POTASSIUM CHLORIDE, CALCIUM CHLORIDE 600; 310; 30; 20 MG/100ML; MG/100ML; MG/100ML; MG/100ML
100 INJECTION, SOLUTION INTRAVENOUS CONTINUOUS
Status: DISCONTINUED | OUTPATIENT
Start: 2024-03-26 | End: 2024-03-26

## 2024-03-26 NOTE — H&P
History & Physical Examination    Patient Name: Felicia Velez  MRN: [id number]  [facility]: [id number]  YOB: 1950    Pre-Operative Diagnosis:  Chronic pain of right knee [M25.561, G89.29]    Present Illness: Knee pain due to osteoarthritis    Facility-Administered Medications Prior to Admission   Medication Dose Route Frequency Provider Last Rate Last Admin    cyanocobalamin (VITAMIN B12) 1000 MCG/ML injection 1,000 mcg  1,000 mcg Intramuscular Once Cynthia Nelson MD        cyanocobalamin (VITAMIN B12) 1000 MCG/ML injection 1,000 mcg  1,000 mcg Intramuscular Once Cynthia Nelson MD         Medications Prior to Admission   Medication Sig Dispense Refill Last Dose    pravastatin 20 MG Oral Tab Take 1 tablet (20 mg total) by mouth every evening. 90 tablet 0     nortriptyline 25 MG Oral Cap TAKE 1 CAPSULE BY MOUTH EVERY NIGHT AT BEDTIME 90 capsule 0     acetaminophen-codeine 300-30 MG Oral Tab Take 1 tablet by mouth every 4 (four) hours as needed. 50 tablet 0     Meloxicam 7.5 MG Oral Tab Take 1 tablet (7.5 mg total) by mouth daily. 30 tablet 1     spironolactone 25 MG Oral Tab Take 1 tablet (25 mg total) by mouth daily. 90 tablet 0     topiramate 50 MG Oral Tab Take 1 tablet (50 mg total) by mouth 2 (two) times daily. 180 tablet 0     metFORMIN HCl 1000 MG Oral Tab Take 1 tablet (1,000 mg total) by mouth 2 (two) times daily. 180 tablet 0     pregabalin 150 MG Oral Cap Take 1 capsule (150 mg total) by mouth 2 (two) times daily. 180 capsule 3     dicyclomine 10 MG Oral Cap Take 1 capsule (10 mg total) by mouth nightly as needed. 120 capsule 0     empagliflozin (JARDIANCE) 10 MG Oral Tab Take 1 tablet (10 mg total) by mouth daily. 90 tablet 1     TOPIRAMATE 25 MG Oral Tab TAKE 1 TABLET(25 MG) BY MOUTH TWICE DAILY 180 tablet 1     ondansetron 8 MG Oral Tablet Dispersible Take 1 tablet (8 mg total) by mouth 3 (three) times daily as needed.       Ciclopirox 8 % External Solution apply to affected toenails at bedtime  for 3 months 1 each 2     FAMOTIDINE 40 MG Oral Tab TAKE 1 TABLET(40 MG) BY MOUTH EVERY NIGHT 90 tablet 3     HYDROCHLOROTHIAZIDE OR        omeprazole 20 MG Oral Capsule Delayed Release Take 1 capsule (20 mg total) by mouth in the morning and 1 capsule (20 mg total) in the evening. Take before meals. 180 capsule 3     Ursodiol 500 MG Oral Tab        ursodiol 300 MG Oral Cap Take 1 capsule (300 mg total) by mouth 3 (three) times daily. Patient takes:  1.5 tabs in morning  1 tab in afternoon  1 tab at night       Blood Glucose Monitoring Suppl (ACCU-CHEK GUIDE) w/Device Does not apply Kit 1 kit by Other route daily. 1 kit 0     Glucose Blood (ACCU-CHEK GUIDE) In Vitro Strip 1 strip by In Vitro route daily. 100 strip 0     OneTouch Delica Lancets 33G Does not apply Misc USE DAILY AS DIRECTED 100 each 0     ONETOUCH ULTRA BLUE In Vitro Strip TEST SUGAR DAILY 100 strip 0     aspirin (ASPIR-81) 81 MG Oral Tab EC Take 1 tablet (81 mg total) by mouth daily. 1 tablet 0      Current Facility-Administered Medications   Medication Dose Route Frequency    lactated ringers infusion  100 mL/hr Intravenous Continuous    ondansetron (Zofran) 4 MG/2ML injection 4 mg  4 mg Intravenous Once PRN       Allergies:   Allergies   Allergen Reactions    Fish-Derived Products ITCHING and SWELLING    Shellfish ITCHING and SWELLING    Zithromax RASH and SWELLING     TABS    Compound Iodine Solution [Iodine (Topical)] UNKNOWN       Past Medical History:   Diagnosis Date    Abdominal hernia     Anxiety     Arthritis     Back pain     Blurred vision     Change in hair     Colitis     Depression     DIABETES     Diabetes mellitus (HCC)     Esophageal reflux     Essential hypertension, benign 04/12/2013    Food intolerance     Heartburn     Indigestion     Lipid screening 04/25/2012    Low blood potassium     potassium is going up and down     Measles without mention of complication     Mumps without mention of complication     Night sweats      Osteoarthrosis, unspecified whether generalized or localized, unspecified site     Other and unspecified hyperlipidemia     OTHER DISEASES     bronchitis    Pain in joints     Pain with bowel movements     Pure hypercholesterolemia 2013    Type II or unspecified type diabetes mellitus without mention of complication, not stated as uncontrolled     Unspecified essential hypertension     Varicella without mention of complication     Visual impairment     Wears glasses      Past Surgical History:   Procedure Laterality Date    BACK SURGERY                  x3    COLONOSCOPY  2012    rck 10 yrs    COLONOSCOPY N/A 2022    Procedure: COLONOSCOPY,   ESOPHAGOGASTRODUODENOSCOPY with biopsies;  Surgeon: Gerri Upton DO;  Location:  ENDOSCOPY    HEMORRHOIDECTOMY      HEMORRHOIDECTOMY,INT/EXT,SIMPLE      HERNIA SURGERY      HERNIA SURGERY      abdominal hernia repair    KNEE REPLACEMENT SURGERY  2010, 2010    lt in 2010, rt in 2010    LAMINECTOMY,LUMBAR      L4 L5    OTHER SURGICAL HISTORY      anal fistulectomy     Family History   Problem Relation Age of Onset    Hypertension Mother     Lipids Mother     Cancer Maternal Grandmother     Cancer Maternal Grandfather     Cancer Paternal Grandmother     Breast Cancer Maternal Aunt 60    Prostate Cancer Other     Bone Cancer Other     Heart Disease Neg     Stroke Neg      Social History     Tobacco Use    Smoking status: Former     Packs/day: 0.30     Years: 12.00     Additional pack years: 0.00     Total pack years: 3.60     Types: Cigarettes     Quit date: 2015     Years since quittin.2    Smokeless tobacco: Never   Substance Use Topics    Alcohol use: No     Alcohol/week: 0.0 standard drinks of alcohol       SYSTEM Check if Review is Normal Check if Physical Exam is Normal If not normal, please explain:   HEENT [x ] [x ]    NECK & BACK [x ] [x ]    HEART [x ] [x ]    LUNGS [x ] [x ]    ABDOMEN [x ] [x ]    UROGENITAL [x  ] [x ]    EXTREMITIES [x ] [x ]    OTHER        [ x ] I have discussed the risks and benefits and alternatives with the patient/family.  They understand and agree to proceed with plan of care.  [ x ] I have reviewed the History and Physical done within the last 30 days.  Any changes noted above.    Minesh Reis MD

## 2024-03-26 NOTE — DISCHARGE INSTRUCTIONS
You have been given medication that makes you sleepy. This may have included both pain medication and sleeping medication. Most of the effects have worn off but you may still have some drowsiness for the next 6 to 8 hours.    Home Care  Follow these guidelines when you get home:    --Don't drink any alcohol for the next 24 hours.    --Don't drive, operate dangerous machinery, make important business or personal    decisions, or sign legal documents during the next 24 hours.    Follow Up Care  Follow up with your healthcare provider if you are not alert and back to your usual level of activity within 12 hours    When to seek medical advice  Call your healthcare provider right away if any of these occur:    --Drowsiness that gets worse  --Weakness or dizziness that gets worse  --Repeated vomiting  --You can not be awakened   Home Care Instructions Following Your Pain Procedure     Felicia,  It has been a pleasure to have you as our patient. To help you at home, you must follow these general discharge instructions. We will review these with you before you are discharged. It is our hope that you have a complete and uneventful recovery from our procedure.     General Instructions:  What to Expect:  Bandages from your procedure today can be removed when you get home.  Please avoid soaking and/or swimming for 24 hours.  Showering is okay  It is normal to have increased pain symptoms and/or pain at injection site for up to 3-5 days after procedure, you can use heat or ice (20 minutes on 20 minutes off) for comfort.  You may experience some temporary side effects which may include restlessness or insomnia, flushing of the face, or heart palpitations.  Please contact the provider if these symptoms do not resolve within 3-4 days.  Lightheadedness or nausea may occur and should resolve within 24 to 48 hours.  If you develop a headache after treatment, rest, drink fluids (with caffeine, if possible) and take mild over-the-counter  pain medication.  If the headache does not improve with the above treatment, contact the physician.  Home Medications:  Resume all previously prescribed medication.  Please avoid taking NSAIDs (Non-Steriodal Anti-Inflammatory Drugs) such as:  Ibuprofen ( Advil, Motrin) Aleve (Naproxen), Diclofenac, Meloxicam for 6 hours after procedure.   If you are on Coumadin (Warfarin) or any other anti-coagulant (or \"blood thinning\") medication such as Plavix (Clopidogrel), Xarelto (Rivaroxaban), Eliquis (Apixaban), Effient (Prasugrel) etc., restart on the following day from the procedure unless otherwise directed by your provider.  If you are a diabetic, please increase the frequency of your glucose monitoring after the procedure as steroids may cause a temporary (2-3 day) increase in your blood sugar.  Contact your primary care physician if your blood sugar remains elevated as you may require some medication adjustment.  Diet:  Resume your regular diet as tolerated.  Activity:  We recommend that you relax and rest during the rest of your procedure day.  If you feel weakness in your arms or legs do not drive.  Follow-up Appointment  Please schedule a follow-up visit within 3 to 4 weeks after your last procedure date.  Question or Concerns:  Feel free to call our office with any questions or concerns at [phone] (option #2)    Felicia  Thank you for coming to [facility] for your procedure.  The nurses try very hard to make sure you receive the best care possible.  Your trust in them as well as us is greatly appreciated.    Thanks so much,   Dr. Minesh Reis

## 2024-03-26 NOTE — OPERATIVE REPORT
[facility]  Operative Report  3/26/2024     Felicia Velez Patient Status:  Hospital Outpatient Surgery    1950 MRN [id number]   Location [facility] ENDOSCOPY PAIN [facility]ENTER Attending Minesh Reis MD   Hosp Day # 0 PCP Cynthia Nelson MD     Indication: Felicia is a 73 year old female chronic knee pain    Preoperative Diagnosis:  Chronic pain of right knee [M25.561, G89.29]    Postoperative Diagnosis: Same as above.    Procedure performed: RIGHT GENICULAR NERVE BLOCK with sedation    Anesthesia: Local and IV Sedation.    EBL: Less than 1 ml.    Procedure Description:  After reviewing the patient's history and performing a focused physical examination, the diagnosis and positive previous diagnostic tests were confirmed and contraindications such as infection and coagulopathy were ruled out.  Following review of allergies and potential side effects and complications, including but not necessarily limited to infection, allergic reaction, local tissue breakdown, nerve injury,  and paresis, the patient consented for the procedure.    The patient was brought to the procedure room in the supine position.  After comprehensive monitors were applied and IV access in the patient's arm, moderate intravenous sedation was given with versed and fentanyl. Moderate sedation was given for 9 minutes.  The operative knee was then identified and prepped and draped in the usual sterile fashion.  AP imaging was used to identify the path of the superior medial, superior lateral, inferior medial genicular nerves.  The overlying soft tissue was then anesthetized with 3 cc 1% lidocaine at each location.  Subsequently, a 3.5 inch 22-gauge spinal needle was advanced imaging guidance to contact bone along the path of the superior medial, superior lateral, and inferior medial genicular nerves.  Needle position was confirmed on both AP and lateral views.  After negative aspiration for heme, total mixture of 40 mg of Depo-Medrol  with 9 cc of 0.5% bupivacaine was then evenly divided amongst the 3 sites.  The needle was then flushed with 1 cc 1% lidocaine, re-styletted and removed with tip intact.   The patient tolerated the procedure very well without significant immediate complication.          Complications: None.    Follow up: The patient was followed in the pain clinic as needed basis.          Minesh Reis MD

## 2024-03-27 ENCOUNTER — TELEPHONE (OUTPATIENT)
Dept: PAIN CLINIC | Facility: CLINIC | Age: 74
End: 2024-03-27

## 2024-03-27 NOTE — TELEPHONE ENCOUNTER
Courtesy called placed to patient for post procedure follow up. Patient stated \"Still in pain\". Informed patient that soreness is to be expected after the procedure. Educated patient that it takes 3-5 days for the steroid to be effective and to allow adequate time for medication to work. Encouraged patient to alternate ice and heat and to take medications as prescribed. Pt verbalized understanding to call with any questions or concerns.      Procedure: RIGHT GENICULAR NERVE BLOCK with sedation   Date: 3/26/2024  Follow up Visit Scheduled:  4/10/2024 with

## 2024-03-28 DIAGNOSIS — M96.1 POSTLAMINECTOMY SYNDROME, LUMBAR REGION: ICD-10-CM

## 2024-03-28 RX ORDER — ACETAMINOPHEN AND CODEINE PHOSPHATE 300; 30 MG/1; MG/1
1 TABLET ORAL EVERY 4 HOURS PRN
Qty: 50 TABLET | Refills: 0 | Status: SHIPPED | OUTPATIENT
Start: 2024-03-28

## 2024-04-10 ENCOUNTER — OFFICE VISIT (OUTPATIENT)
Dept: PAIN CLINIC | Facility: CLINIC | Age: 74
End: 2024-04-10
Payer: MEDICARE

## 2024-04-10 VITALS — SYSTOLIC BLOOD PRESSURE: 108 MMHG | DIASTOLIC BLOOD PRESSURE: 86 MMHG

## 2024-04-10 DIAGNOSIS — M54.16 LUMBAR RADICULITIS: Primary | ICD-10-CM

## 2024-04-10 PROCEDURE — 99214 OFFICE O/P EST MOD 30 MIN: CPT | Performed by: ANESTHESIOLOGY

## 2024-04-10 NOTE — PATIENT INSTRUCTIONS
Refill policies:    Allow 2-3 business days for refills; controlled substances may take longer.  Contact your pharmacy at least 5 days prior to running out of medication and have them send an electronic request or submit request through the “request refill” option in your [email or web address] account.  Refills are not addressed on weekends; covering physicians do not authorize routine medications on weekends.  No narcotics or controlled substances are refilled after noon on Fridays or by on call physicians.  By law, narcotics must be electronically prescribed.  A 30 day supply with no refills is the maximum allowed.  If your prescription is due for a refill, you may be due for a follow up appointment.  To best provide you care, patients receiving routine medications need to be seen at least once a year.  Patients receiving narcotic/controlled substance medications need to be seen at least once every 3 months.  In the event that your preferred pharmacy does not have the requested medication in stock (e.g. Backordered), it is your responsibility to find another pharmacy that has the requested medication available.  We will gladly send a new prescription to that pharmacy at your request.    Scheduling Tests:    If your physician has ordered radiology tests such as MRI or CT scans, please contact Central Scheduling at [phone] right away to schedule the test.  Once scheduled, the [facility] Centralized Referral Team will work with your insurance carrier to obtain pre-certification or prior authorization.  Depending on your insurance carrier, approval may take 3-10 days.  It is highly recommended patients assure they have received an authorization before having a test performed.  If test is done without insurance authorization, patient may be responsible for the entire amount billed.      Precertification and Prior Authorizations:  If your physician has recommended that you have a procedure or additional testing performed the [facility]  Centralized Referral Team will contact your insurance carrier to obtain pre-certification or prior authorization.    You are strongly encouraged to contact your insurance carrier to verify that your procedure/test has been approved and is a COVERED benefit.  Although the [facility] Centralized Referral Team does its due diligence, the insurance carrier gives the disclaimer that \"Although the procedure is authorized, this does not guarantee payment.\"    Ultimately the patient is responsible for payment.   Thank you for your understanding in this matter.  Paperwork Completion:  If you require FMLA or disability paperwork for your recovery, please make sure to either drop it off or have it faxed to our office at [phone]. Be sure the form has your name and date of birth on it.  The form will be faxed to our Forms Department and they will complete it for you.  There is a 25$ fee for all forms that need to be filled out.  Please be aware there is a 10-14 day turnaround time.  You will need to sign a release of information (JAME) form if your paperwork does not come with one.  You may call the Forms Department with any questions at [phone].  Their fax number is [phone].

## 2024-04-10 NOTE — PROGRESS NOTES
Last procedure:   RIGHT GENICULAR NERVE BLOCK with sedation     Date: 3/26/24  Percentage of relief obtained: 10%  Duration of relief: pain is still present, relief is on and off    Current Pain Score: 7    Narcotic Contract Exp: NA

## 2024-04-10 NOTE — PROGRESS NOTES
Name: Felicia Velez   : 1950   DOS: 4/10/2024     Pain Clinic Follow Up Visit:     Chief Complaint   Patient presents with    Procedure Follow Up     FU after RIGHT GENICULAR NERVE BLOCK with sedation        Felicia Velez is a 73 year old female with prior history of right TKA and chronic knee pain here for follow-up after genicular nerve block.  Patient reports approximately 10% improvement.  Patient is losing weight and is hopeful to have surgical correction to address her knee pain symptoms.  Today, also complains of low back pain with right-sided radicular symptoms.  Describes as a numbness and tingling down the right leg.  Starts at the buttock.  Rates his pain as 7 out of 10.    Pt denies any chills, fever, or weakness. There is no bladder or bowel incontinence associated with the pain.    REVIEW OF SYSTEMS:  A ten point review of systems was performed with pertinent positives and negatives in the HPI.    Allergies   Allergen Reactions    Fish-Derived Products ITCHING and SWELLING    Shellfish ITCHING and SWELLING    Zithromax RASH and SWELLING     TABS    Compound Iodine Solution [Iodine (Topical)] UNKNOWN       Current Outpatient Medications   Medication Sig Dispense Refill    acetaminophen-codeine 300-30 MG Oral Tab Take 1 tablet by mouth every 4 (four) hours as needed. 50 tablet 0    pravastatin 20 MG Oral Tab Take 1 tablet (20 mg total) by mouth every evening. 90 tablet 0    nortriptyline 25 MG Oral Cap TAKE 1 CAPSULE BY MOUTH EVERY NIGHT AT BEDTIME 90 capsule 0    Meloxicam 7.5 MG Oral Tab Take 1 tablet (7.5 mg total) by mouth daily. 30 tablet 1    spironolactone 25 MG Oral Tab Take 1 tablet (25 mg total) by mouth daily. 90 tablet 0    topiramate 50 MG Oral Tab Take 1 tablet (50 mg total) by mouth 2 (two) times daily. 180 tablet 0    metFORMIN HCl 1000 MG Oral Tab Take 1 tablet (1,000 mg total) by mouth 2 (two) times daily. 180 tablet 0    pregabalin 150 MG Oral Cap Take 1 capsule (150 mg  total) by mouth 2 (two) times daily. 180 capsule 3    dicyclomine 10 MG Oral Cap Take 1 capsule (10 mg total) by mouth nightly as needed. 120 capsule 0    empagliflozin (JARDIANCE) 10 MG Oral Tab Take 1 tablet (10 mg total) by mouth daily. 90 tablet 1    TOPIRAMATE 25 MG Oral Tab TAKE 1 TABLET(25 MG) BY MOUTH TWICE DAILY 180 tablet 1    ondansetron 8 MG Oral Tablet Dispersible Take 1 tablet (8 mg total) by mouth 3 (three) times daily as needed.      Ciclopirox 8 % External Solution apply to affected toenails at bedtime for 3 months 1 each 2    FAMOTIDINE 40 MG Oral Tab TAKE 1 TABLET(40 MG) BY MOUTH EVERY NIGHT 90 tablet 3    HYDROCHLOROTHIAZIDE OR       omeprazole 20 MG Oral Capsule Delayed Release Take 1 capsule (20 mg total) by mouth in the morning and 1 capsule (20 mg total) in the evening. Take before meals. 180 capsule 3    Ursodiol 500 MG Oral Tab       ursodiol 300 MG Oral Cap Take 1 capsule (300 mg total) by mouth 3 (three) times daily. Patient takes:  1.5 tabs in morning  1 tab in afternoon  1 tab at night      Blood Glucose Monitoring Suppl (ACCU-CHEK GUIDE) w/Device Does not apply Kit 1 kit by Other route daily. 1 kit 0    Glucose Blood (ACCU-CHEK GUIDE) In Vitro Strip 1 strip by In Vitro route daily. 100 strip 0    OneTouch Delica Lancets 33G Does not apply Misc USE DAILY AS DIRECTED 100 each 0    ONETOUCH ULTRA BLUE In Vitro Strip TEST SUGAR DAILY 100 strip 0    aspirin (ASPIR-81) 81 MG Oral Tab EC Take 1 tablet (81 mg total) by mouth daily. 1 tablet 0         EXAM:   /86 (BP Location: Right arm, Patient Position: Sitting)   General:  Patient is a(n) 73 year old year old female in no acute distress.  Neurologic:: WNL-Orientation to time, place and person, normal mood & affect, concentration & attention span intact.   Inspection: In wheelchair in no acute distress  Neck: Patient range of motion maintained  Cranial nerve: Grossly intact  Respiratory: Nonlabored  Back: Lower extremity strength  intact  Knee: Injection site clear.    IMAGES:     No new imaging    ASSESSMENT AND PLAN:     1. Lumbar radiculitis      The patient is a 73-year-old female with a longstanding history of knee pain status post TKA.  Patient has failed genicular nerve block.  At this point, recommend follow-up with orthopedic surgery to discuss if surgical revision would be an option at all.  Patient also complains of low back pain with right-sided radicular symptoms.  Based upon symptoms, suspect lumbar radiculopathy.  Recommend lumbar MRI.    Radiology orders and consultations:MRI SPINE LUMBAR (CPT=72148)  The patient indicates understanding of these issues and agrees to the plan.  No follow-ups on file.    Minesh Reis MD, 4/10/2024, 11:00 AM

## 2024-04-12 ENCOUNTER — OFFICE VISIT (OUTPATIENT)
Dept: INTERNAL MEDICINE CLINIC | Facility: CLINIC | Age: 74
End: 2024-04-12
Payer: MEDICARE

## 2024-04-12 VITALS
DIASTOLIC BLOOD PRESSURE: 60 MMHG | BODY MASS INDEX: 45.59 KG/M2 | HEIGHT: 58 IN | OXYGEN SATURATION: 99 % | HEART RATE: 55 BPM | TEMPERATURE: 98 F | SYSTOLIC BLOOD PRESSURE: 112 MMHG | WEIGHT: 217.19 LBS

## 2024-04-12 DIAGNOSIS — M96.1 POSTLAMINECTOMY SYNDROME, LUMBAR REGION: ICD-10-CM

## 2024-04-12 DIAGNOSIS — R41.3 MEMORY DEFICITS: Primary | ICD-10-CM

## 2024-04-12 PROCEDURE — 99214 OFFICE O/P EST MOD 30 MIN: CPT | Performed by: FAMILY MEDICINE

## 2024-04-12 RX ORDER — ACETAMINOPHEN AND CODEINE PHOSPHATE 300; 30 MG/1; MG/1
1 TABLET ORAL EVERY 8 HOURS PRN
Qty: 100 TABLET | Refills: 0 | Status: SHIPPED | OUTPATIENT
Start: 2024-04-12

## 2024-04-12 NOTE — PROGRESS NOTES
Felicia Velez is a 73 year old female.  HPI:   Pt is here to go over her pain clinic visit     Was there few d ago       They rec MRI lumbar spine    on T#3 BID for her leg pain    not really a back pain or buttock pain but in the R LE    feels like a nerve pain    not tingling  no incont     the leg does feel weak a bit as well      Asking about her memory   does forget things at times     forgets where she places things      states she repeats things at times       asking if needs prevagen      Current Outpatient Medications   Medication Sig Dispense Refill    acetaminophen-codeine 300-30 MG Oral Tab Take 1 tablet by mouth every 4 (four) hours as needed. 50 tablet 0    pravastatin 20 MG Oral Tab Take 1 tablet (20 mg total) by mouth every evening. 90 tablet 0    nortriptyline 25 MG Oral Cap TAKE 1 CAPSULE BY MOUTH EVERY NIGHT AT BEDTIME 90 capsule 0    Meloxicam 7.5 MG Oral Tab Take 1 tablet (7.5 mg total) by mouth daily. 30 tablet 1    spironolactone 25 MG Oral Tab Take 1 tablet (25 mg total) by mouth daily. 90 tablet 0    topiramate 50 MG Oral Tab Take 1 tablet (50 mg total) by mouth 2 (two) times daily. 180 tablet 0    metFORMIN HCl 1000 MG Oral Tab Take 1 tablet (1,000 mg total) by mouth 2 (two) times daily. 180 tablet 0    pregabalin 150 MG Oral Cap Take 1 capsule (150 mg total) by mouth 2 (two) times daily. 180 capsule 3    dicyclomine 10 MG Oral Cap Take 1 capsule (10 mg total) by mouth nightly as needed. 120 capsule 0    empagliflozin (JARDIANCE) 10 MG Oral Tab Take 1 tablet (10 mg total) by mouth daily. 90 tablet 1    TOPIRAMATE 25 MG Oral Tab TAKE 1 TABLET(25 MG) BY MOUTH TWICE DAILY 180 tablet 1    ondansetron 8 MG Oral Tablet Dispersible Take 1 tablet (8 mg total) by mouth 3 (three) times daily as needed.      Ciclopirox 8 % External Solution apply to affected toenails at bedtime for 3 months 1 each 2    FAMOTIDINE 40 MG Oral Tab TAKE 1 TABLET(40 MG) BY MOUTH EVERY NIGHT 90 tablet 3     HYDROCHLOROTHIAZIDE OR       omeprazole 20 MG Oral Capsule Delayed Release Take 1 capsule (20 mg total) by mouth in the morning and 1 capsule (20 mg total) in the evening. Take before meals. 180 capsule 3    Ursodiol 500 MG Oral Tab       ursodiol 300 MG Oral Cap Take 1 capsule (300 mg total) by mouth 3 (three) times daily. Patient takes:  1.5 tabs in morning  1 tab in afternoon  1 tab at night      Blood Glucose Monitoring Suppl (ACCU-CHEK GUIDE) w/Device Does not apply Kit 1 kit by Other route daily. 1 kit 0    Glucose Blood (ACCU-CHEK GUIDE) In Vitro Strip 1 strip by In Vitro route daily. 100 strip 0    OneTouch Delica Lancets 33G Does not apply Misc USE DAILY AS DIRECTED 100 each 0    ONETOUCH ULTRA BLUE In Vitro Strip TEST SUGAR DAILY 100 strip 0    aspirin (ASPIR-81) 81 MG Oral Tab EC Take 1 tablet (81 mg total) by mouth daily. 1 tablet 0      Past Medical History:    Abdominal hernia    Anxiety    Arthritis    Back pain    Blurred vision    Change in hair    Colitis    Depression    DIABETES    Diabetes mellitus (HCC)    Esophageal reflux    Essential hypertension, benign    Food intolerance    Heartburn    Indigestion    Lipid screening    Low blood potassium    potassium is going up and down     Measles without mention of complication    Mumps without mention of complication    Night sweats    Osteoarthrosis, unspecified whether generalized or localized, unspecified site    Other and unspecified hyperlipidemia    OTHER DISEASES    bronchitis    Pain in joints    Pain with bowel movements    Pure hypercholesterolemia    Type II or unspecified type diabetes mellitus without mention of complication, not stated as uncontrolled    Unspecified essential hypertension    Varicella without mention of complication    Visual impairment    Wears glasses      Social History:  Social History     Socioeconomic History    Marital status:    Tobacco Use    Smoking status: Former     Current packs/day: 0.00      Average packs/day: 0.3 packs/day for 12.0 years (3.6 ttl pk-yrs)     Types: Cigarettes     Start date: 2003     Quit date: 2015     Years since quittin.2    Smokeless tobacco: Never   Vaping Use    Vaping status: Never Used   Substance and Sexual Activity    Alcohol use: No     Alcohol/week: 0.0 standard drinks of alcohol    Drug use: No   Other Topics Concern    Caffeine Concern Yes     Comment: 0-1 cup daily.     Stress Concern Yes    Weight Concern Yes    Special Diet Yes     Comment: diabetic diet     Exercise No    Seat Belt Yes     Social Determinants of Health      Received from [facility]        REVIEW OF SYSTEMS:   GENERAL HEALTH: feels well otherwise       EXAM:   /60 (BP Location: Left arm, Patient Position: Sitting, Cuff Size: large)   Pulse 55   Temp 98.1 °F (36.7 °C) (Temporal)   Ht 4' 10\" (1.473 m)   Wt 217 lb 3.2 oz (98.5 kg)   SpO2 99%   BMI 45.39 kg/m²   GENERAL: well developed, well nourished,in no apparent distress   in WC  here w           ASSESSMENT AND PLAN:   1. Postlaminectomy syndrome, lumbar region  D/w pt her s/s    reviewed the appt w Dr Reis and previous methods to control her s/s    On lyrica, meloxicam pamelor     rec increase T#3 to TID prn   ordered     Rec get the MRI that was ordered to assess the back and proceed from there        Will call today     - acetaminophen-codeine 300-30 MG Oral Tab; Take 1 tablet by mouth every 8 (eight) hours as needed.  Dispense: 100 tablet; Refill: 0    2. Memory deficits  Not convinced has dementia     suspect d/t pain and her meds    OK to try prevagen as is OTC     follow her s/s       The patient indicates understanding of these issues and agrees to the plan.  .

## 2024-04-23 DIAGNOSIS — E11.42 TYPE 2 DIABETES MELLITUS WITH DIABETIC POLYNEUROPATHY, WITHOUT LONG-TERM CURRENT USE OF INSULIN (HCC): ICD-10-CM

## 2024-04-23 DIAGNOSIS — M25.561 ACUTE PAIN OF RIGHT KNEE: ICD-10-CM

## 2024-04-24 RX ORDER — SPIRONOLACTONE 25 MG/1
25 TABLET ORAL DAILY
Qty: 90 TABLET | Refills: 0 | Status: SHIPPED | OUTPATIENT
Start: 2024-04-24

## 2024-04-24 RX ORDER — MELOXICAM 7.5 MG/1
7.5 TABLET ORAL DAILY
Qty: 30 TABLET | Refills: 1 | Status: SHIPPED | OUTPATIENT
Start: 2024-04-24

## 2024-04-24 RX ORDER — TOPIRAMATE 50 MG/1
50 TABLET, FILM COATED ORAL 2 TIMES DAILY
Qty: 180 TABLET | Refills: 0 | Status: SHIPPED | OUTPATIENT
Start: 2024-04-24

## 2024-04-24 NOTE — TELEPHONE ENCOUNTER
Requesting   Name from pharmacy: SPIRONOLACTONE 25MG TABLETS          Will file in chart as: SPIRONOLACTONE 25 MG Oral Tab    Sig: TAKE 1 TABLET(25 MG) BY MOUTH DAILY    Disp: 90 tablet    Refills: 0 (Pharmacy requested: Not specified)    Start: 4/23/2024    Class: Normal    Non-formulary    Last ordered: 2 months ago (1/29/2024) by Cynthia Nelson MD    Last refill: 1/29/2024    Rx #: [id number]    Hypertension Medications Protocol Suefue7204/23/2024 05:53 AM   Protocol Details CMP or BMP in past 12 months    Last BP reading less than 140/90    In person appointment or virtual visit in the past 12 mos or appointment in next 3 mos    EGFRCR or GFRAA > 50       Name from pharmacy: TOPIRAMATE 50MG TABLETS         Will file in chart as: TOPIRAMATE 50 MG Oral Tab    Sig: TAKE 1 TABLET(50 MG) BY MOUTH TWICE DAILY    Disp: 180 tablet    Refills: 0 (Pharmacy requested: Not specified)    Start: 4/23/2024    Class: Normal    Non-formulary    Last ordered: 2 months ago (1/29/2024) by Cynthia Nelson MD    Last refill: 1/29/2024    Rx #: [id number]    Neurology Medications Vxjlwm6504/23/2024 05:53 AM   Protocol Details In person appointment or virtual visit in the past 6 mos or appointment in next 3 mos       Name from pharmacy: METFORMIN 1000MG TABLETS         Will file in chart as: METFORMIN HCL 1000 MG Oral Tab    Sig: TAKE 1 TABLET(1000 MG) BY MOUTH TWICE DAILY    Disp: 180 tablet    Refills: 0 (Pharmacy requested: Not specified)    Start: 4/23/2024    Class: Normal    Non-formulary For: Type 2 diabetes mellitus with diabetic polyneuropathy, without long-term current use of insulin (HCC)    Last ordered: 2 months ago (1/29/2024) by Cynthia Nelson MD    Last refill: 1/29/2024    Rx #: [id number]    Diabetes Medication Protocol Ccbmpz7004/23/2024 05:53 AM   Protocol Details Microalbumin procedure in past 12 months or taking ACE/ARB    Last A1C < 7.5 and within past 6 months    In person appointment or virtual  visit in the past 6 mos or appointment in next 3 mos    EGFRCR or GFRAA > 50    GFR in the past 12 months       Name from pharmacy: MELOXICAM 7.5MG TABLETS         Will file in chart as: MELOXICAM 7.5 MG Oral Tab    Sig: TAKE 1 TABLET(7.5 MG) BY MOUTH DAILY    Disp: 30 tablet    Refills: 1 (Pharmacy requested: Not specified)    Start: 4/23/2024    Class: Normal    Non-formulary For: Acute pain of right knee    Last ordered: 2 months ago (2/23/2024) by Cynthia Nelson MD    Last refill: 3/24/2024    Rx #: [id number]    Non-Narcotic Pain Medication Protocol Csezdl0804/23/2024 05:53 AM   Protocol Details In person appointment or virtual visit in the past 6 mos or appointment in next 3 mos        LOV: 4/12/2024  RTC: none noted   Filled: 1/29/2024 #90 with 0 refills    Future Appointments   Date Time Provider Department Center   5/2/2024 11:00 AM PF MRI [facility]1 (1.5T) [facility] [place]

## 2024-05-02 ENCOUNTER — HOSPITAL ENCOUNTER (OUTPATIENT)
Dept: MRI IMAGING | Age: 74
Discharge: HOME | End: 2024-05-02
Attending: ANESTHESIOLOGY
Payer: MEDICARE

## 2024-05-02 DIAGNOSIS — M54.16 LUMBAR RADICULITIS: ICD-10-CM

## 2024-05-02 PROCEDURE — 72148 MRI LUMBAR SPINE W/O DYE: CPT | Performed by: ANESTHESIOLOGY

## 2024-05-08 ENCOUNTER — TELEPHONE (OUTPATIENT)
Dept: PAIN CLINIC | Facility: CLINIC | Age: 74
End: 2024-05-08

## 2024-05-13 DIAGNOSIS — M96.1 POSTLAMINECTOMY SYNDROME, LUMBAR REGION: ICD-10-CM

## 2024-05-13 NOTE — TELEPHONE ENCOUNTER
Requesting    Name from pharmacy: ACETAMINOPHEN/COD #3 (300/30MG) TAB         Will file in chart as: ACETAMINOPHEN-CODEINE 300-30 MG Oral Tab    Sig: TAKE 1 TABLET BY MOUTH EVERY 8 HOURS AS NEEDED    Disp: 100 tablet    Refills: 0 (Pharmacy requested: Not specified)    Start: 5/13/2024    Class: Normal    Non-formulary For: Postlaminectomy syndrome, lumbar region    Last ordered: 1 month ago (4/12/2024) by Cynthia Nelson MD    Last refill: 4/12/2024    Rx #: [id number]    Controlled Substance Medication Jyvqjj1605/13/2024 12:06 PM    This medication is a controlled substance - forward to provider to refill        LOV: 4/12/2024  RTC: none noted   Last Relevant Labs: n/a   Filled: 4/12/024 #100 with 0 refills    Future Appointments   Date Time Provider Department Center   5/15/2024  3:00 PM Minesh Reis MD [facility] [place]

## 2024-05-14 RX ORDER — ACETAMINOPHEN AND CODEINE PHOSPHATE 300; 30 MG/1; MG/1
1 TABLET ORAL EVERY 8 HOURS PRN
Qty: 100 TABLET | Refills: 0 | Status: SHIPPED | OUTPATIENT
Start: 2024-05-14

## 2024-05-15 ENCOUNTER — VIRTUAL PHONE E/M (OUTPATIENT)
Dept: PAIN CLINIC | Facility: CLINIC | Age: 74
End: 2024-05-15

## 2024-05-15 DIAGNOSIS — M48.062 SPINAL STENOSIS OF LUMBAR REGION WITH NEUROGENIC CLAUDICATION: Primary | ICD-10-CM

## 2024-05-15 PROCEDURE — 99214 OFFICE O/P EST MOD 30 MIN: CPT | Performed by: ANESTHESIOLOGY

## 2024-05-15 NOTE — PROGRESS NOTES
Virtual Telephone Check-In    Felicia Velez verbally consents to a Virtual/Telephone Check-In visit on 05/15/24.  Patient has been referred to the [facility] website at [email or web address] to review the yearly Consent to Treat document.    Patient understands and accepts financial responsibility for any deductible, co-insurance and/or co-pays associated with this service.    Duration of the service: 30 minutes      Summary of topics discussed:     The patient is a 73-year-old female with a history of leg pain.  Patient also had recent MRI of the lumbar spine.  MRI reviewed with evidence of severe central canal stenosis L4-5.  Discussed these findings with patient in detail.  Reviewed the imaging findings multiple times.  Patient states that her back pain and lumbar radicular symptoms are not her primary complaint.  She is interested in revision knee replacement.  Will defer to orthopedics.      Minesh Reis MD

## 2024-05-22 RX ORDER — TOPIRAMATE 25 MG/1
25 TABLET ORAL 2 TIMES DAILY
Qty: 180 TABLET | Refills: 0 | Status: SHIPPED | OUTPATIENT
Start: 2024-05-22

## 2024-05-23 RX ORDER — EMPAGLIFLOZIN 10 MG/1
10 TABLET, FILM COATED ORAL DAILY
Qty: 90 TABLET | Refills: 0 | Status: SHIPPED | OUTPATIENT
Start: 2024-05-23

## 2024-05-29 ENCOUNTER — TELEPHONE (OUTPATIENT)
Dept: INTERNAL MEDICINE CLINIC | Facility: CLINIC | Age: 74
End: 2024-05-29

## 2024-05-29 DIAGNOSIS — E11.42 TYPE 2 DIABETES MELLITUS WITH DIABETIC POLYNEUROPATHY, WITHOUT LONG-TERM CURRENT USE OF INSULIN (HCC): Primary | ICD-10-CM

## 2024-05-29 RX ORDER — BLOOD-GLUCOSE METER
1 EACH MISCELLANEOUS DAILY
Qty: 1 KIT | Refills: 0 | Status: SHIPPED | OUTPATIENT
Start: 2024-05-29

## 2024-05-29 RX ORDER — BLOOD SUGAR DIAGNOSTIC
1 STRIP MISCELLANEOUS DAILY
Qty: 100 STRIP | Refills: 0 | Status: SHIPPED | OUTPATIENT
Start: 2024-05-29

## 2024-05-29 NOTE — TELEPHONE ENCOUNTER
Patient called to request new ACCU-CHEK device replacement.  Also requesting refill on Glucose Blood (ACCU-CHEK GUIDE) In Vitro Strip.  Patient states she lost last device she was given and has not been able to find it. Patient contacted social security and SS instructed patient to call pcp to request replacement.     Send both to   [id number] - [place], [place] - [address] AT [facility] OF [address], [phone], [phone]

## 2024-06-15 DIAGNOSIS — M96.1 POSTLAMINECTOMY SYNDROME, LUMBAR REGION: ICD-10-CM

## 2024-06-16 DIAGNOSIS — M25.561 ACUTE PAIN OF RIGHT KNEE: ICD-10-CM

## 2024-06-17 RX ORDER — ACETAMINOPHEN AND CODEINE PHOSPHATE 300; 30 MG/1; MG/1
1 TABLET ORAL EVERY 8 HOURS PRN
Qty: 100 TABLET | Refills: 0 | Status: SHIPPED | OUTPATIENT
Start: 2024-06-17

## 2024-06-17 RX ORDER — MELOXICAM 7.5 MG/1
7.5 TABLET ORAL DAILY
Qty: 30 TABLET | Refills: 1 | Status: SHIPPED | OUTPATIENT
Start: 2024-06-17

## 2024-06-17 RX ORDER — NORTRIPTYLINE HYDROCHLORIDE 25 MG/1
CAPSULE ORAL
Qty: 90 CAPSULE | Refills: 0 | Status: SHIPPED | OUTPATIENT
Start: 2024-06-17

## 2024-06-17 NOTE — TELEPHONE ENCOUNTER
Requesting    Name from pharmacy: ACETAMINOPHEN/COD #3 (300/30MG) TAB         Will file in chart as: ACETAMINOPHEN-CODEINE 300-30 MG Oral Tab    Sig: TAKE 1 TABLET BY MOUTH EVERY 8 HOURS AS NEEDED    Disp: 100 tablet    Refills: 0 (Pharmacy requested: Not specified)    Start: 6/15/2024    Class: Normal    Non-formulary For: Postlaminectomy syndrome, lumbar region    Last ordered: 1 month ago (5/14/2024) by Cynthia Nelson MD    Last refill: 5/14/2024    Rx #: [id number]    Controlled Substance Medication Rstqqs92/15/2024 11:31 AM    This medication is a controlled substance - forward to provider to refill        LOV: 4/12/2024  RTC: none noted   Last Relevant Labs: n/a  Filled: 5/14/2024 #100 with 0 refills    Future Appointments   Date Time Provider Department Center   6/21/2024  9:00 AM Cynthia Nelson MD [facility] [place]

## 2024-06-17 NOTE — TELEPHONE ENCOUNTER
Meloxicam 7.5 mg  Filled 4-24-24  Qty 30  1 refill  Future Appointments   Date Time Provider Department Center   6/21/2024  9:00 AM Cynthia Nelson MD [facility] [place]   LOV 1-20-24 TO    Notriptyline 25 mg  Filled 3-6-24  Qty 90  0 refill  Future Appointments   Date Time Provider Department Center   6/21/2024  9:00 AM Cynthia Nelson MD [facility] [place]   LOV 1-20-24 TO

## 2024-06-21 ENCOUNTER — LAB ENCOUNTER (OUTPATIENT)
Dept: LAB | Age: 74
End: 2024-06-21
Attending: FAMILY MEDICINE

## 2024-06-21 ENCOUNTER — OFFICE VISIT (OUTPATIENT)
Dept: INTERNAL MEDICINE CLINIC | Facility: CLINIC | Age: 74
End: 2024-06-21

## 2024-06-21 VITALS
BODY MASS INDEX: 43.16 KG/M2 | HEIGHT: 58 IN | WEIGHT: 205.63 LBS | SYSTOLIC BLOOD PRESSURE: 112 MMHG | OXYGEN SATURATION: 97 % | HEART RATE: 80 BPM | TEMPERATURE: 98 F | DIASTOLIC BLOOD PRESSURE: 60 MMHG

## 2024-06-21 DIAGNOSIS — G89.29 CHRONIC PAIN OF RIGHT KNEE: ICD-10-CM

## 2024-06-21 DIAGNOSIS — E11.3299 MILD NONPROLIFERATIVE DIABETIC RETINOPATHY ASSOCIATED WITH TYPE 2 DIABETES MELLITUS, MACULAR EDEMA PRESENCE UNSPECIFIED, UNSPECIFIED LATERALITY (HCC): ICD-10-CM

## 2024-06-21 DIAGNOSIS — H40.003 PREGLAUCOMA, UNSPECIFIED, BILATERAL: ICD-10-CM

## 2024-06-21 DIAGNOSIS — Z12.11 COLON CANCER SCREENING: ICD-10-CM

## 2024-06-21 DIAGNOSIS — H47.239 GLAUCOMATOUS ATROPHY OF OPTIC DISC, UNSPECIFIED LATERALITY: ICD-10-CM

## 2024-06-21 DIAGNOSIS — Z00.00 ENCOUNTER FOR ANNUAL HEALTH EXAMINATION: ICD-10-CM

## 2024-06-21 DIAGNOSIS — K80.20 ASYMPTOMATIC GALLSTONES: ICD-10-CM

## 2024-06-21 DIAGNOSIS — I10 ESSENTIAL HYPERTENSION, BENIGN: ICD-10-CM

## 2024-06-21 DIAGNOSIS — M25.561 CHRONIC PAIN OF RIGHT KNEE: ICD-10-CM

## 2024-06-21 DIAGNOSIS — K21.9 GASTROESOPHAGEAL REFLUX DISEASE WITHOUT ESOPHAGITIS: ICD-10-CM

## 2024-06-21 DIAGNOSIS — E11.42 TYPE 2 DIABETES MELLITUS WITH DIABETIC POLYNEUROPATHY, WITHOUT LONG-TERM CURRENT USE OF INSULIN (HCC): Primary | ICD-10-CM

## 2024-06-21 DIAGNOSIS — E11.42 TYPE 2 DIABETES MELLITUS WITH DIABETIC POLYNEUROPATHY, WITHOUT LONG-TERM CURRENT USE OF INSULIN (HCC): ICD-10-CM

## 2024-06-21 DIAGNOSIS — K29.70 GASTRITIS AND GASTRODUODENITIS: ICD-10-CM

## 2024-06-21 DIAGNOSIS — E78.00 PURE HYPERCHOLESTEROLEMIA: ICD-10-CM

## 2024-06-21 DIAGNOSIS — K29.90 GASTRITIS AND GASTRODUODENITIS: ICD-10-CM

## 2024-06-21 DIAGNOSIS — H25.9 AGE-RELATED CATARACT OF BOTH EYES, UNSPECIFIED AGE-RELATED CATARACT TYPE: ICD-10-CM

## 2024-06-21 DIAGNOSIS — M48.062 SPINAL STENOSIS OF LUMBAR REGION WITH NEUROGENIC CLAUDICATION: ICD-10-CM

## 2024-06-21 DIAGNOSIS — H35.3190 NONEXUDATIVE AGE-RELATED MACULAR DEGENERATION, UNSPECIFIED LATERALITY, UNSPECIFIED STAGE: ICD-10-CM

## 2024-06-21 DIAGNOSIS — E66.01 MORBID OBESITY DUE TO EXCESS CALORIES (HCC): ICD-10-CM

## 2024-06-21 DIAGNOSIS — M96.1 POSTLAMINECTOMY SYNDROME, LUMBAR REGION: ICD-10-CM

## 2024-06-21 DIAGNOSIS — K76.0 NAFLD (NONALCOHOLIC FATTY LIVER DISEASE): ICD-10-CM

## 2024-06-21 LAB
ALBUMIN SERPL-MCNC: 4.2 G/DL (ref 3.2–4.8)
ALBUMIN/GLOB SERPL: 1.3 {RATIO} (ref 1–2)
ALP LIVER SERPL-CCNC: 126 U/L
ALT SERPL-CCNC: 35 U/L
ANION GAP SERPL CALC-SCNC: 6 MMOL/L (ref 0–18)
AST SERPL-CCNC: 50 U/L (ref ?–34)
BASOPHILS # BLD AUTO: 0.02 X10(3) UL (ref 0–0.2)
BASOPHILS NFR BLD AUTO: 0.5 %
BILIRUB SERPL-MCNC: 0.7 MG/DL (ref 0.2–1.1)
BILIRUB UR QL: NEGATIVE
BUN BLD-MCNC: 12 MG/DL (ref 9–23)
BUN/CREAT SERPL: 15.8 (ref 10–20)
CALCIUM BLD-MCNC: 9.4 MG/DL (ref 8.7–10.4)
CHLORIDE SERPL-SCNC: 110 MMOL/L (ref 98–112)
CHOLEST SERPL-MCNC: 154 MG/DL (ref ?–200)
CO2 SERPL-SCNC: 27 MMOL/L (ref 21–32)
COLOR UR: YELLOW
CREAT BLD-MCNC: 0.76 MG/DL
CREAT UR-SCNC: 92.1 MG/DL
DEPRECATED RDW RBC AUTO: 54.7 FL (ref 35.1–46.3)
EGFRCR SERPLBLD CKD-EPI 2021: 83 ML/MIN/1.73M2 (ref 60–?)
EOSINOPHIL # BLD AUTO: 0.06 X10(3) UL (ref 0–0.7)
EOSINOPHIL NFR BLD AUTO: 1.4 %
ERYTHROCYTE [DISTWIDTH] IN BLOOD BY AUTOMATED COUNT: 15.4 % (ref 11–15)
EST. AVERAGE GLUCOSE BLD GHB EST-MCNC: 120 MG/DL (ref 68–126)
FASTING PATIENT LIPID ANSWER: YES
FASTING STATUS PATIENT QL REPORTED: YES
GLOBULIN PLAS-MCNC: 3.2 G/DL (ref 2–3.5)
GLUCOSE BLD-MCNC: 74 MG/DL (ref 70–99)
GLUCOSE UR-MCNC: >1000 MG/DL
HBA1C MFR BLD: 5.8 % (ref ?–5.7)
HCT VFR BLD AUTO: 37.9 %
HDLC SERPL-MCNC: 72 MG/DL (ref 40–59)
HGB BLD-MCNC: 12.1 G/DL
HGB UR QL STRIP.AUTO: NEGATIVE
IMM GRANULOCYTES # BLD AUTO: 0.03 X10(3) UL (ref 0–1)
IMM GRANULOCYTES NFR BLD: 0.7 %
KETONES UR-MCNC: 10 MG/DL
LDLC SERPL CALC-MCNC: 71 MG/DL (ref ?–100)
LEUKOCYTE ESTERASE UR QL STRIP.AUTO: NEGATIVE
LYMPHOCYTES # BLD AUTO: 1.49 X10(3) UL (ref 1–4)
LYMPHOCYTES NFR BLD AUTO: 34.8 %
MCH RBC QN AUTO: 30.6 PG (ref 26–34)
MCHC RBC AUTO-ENTMCNC: 31.9 G/DL (ref 31–37)
MCV RBC AUTO: 95.7 FL
MICROALBUMIN UR-MCNC: <0.3 MG/DL
MONOCYTES # BLD AUTO: 0.33 X10(3) UL (ref 0.1–1)
MONOCYTES NFR BLD AUTO: 7.7 %
NEUTROPHILS # BLD AUTO: 2.35 X10 (3) UL (ref 1.5–7.7)
NEUTROPHILS # BLD AUTO: 2.35 X10(3) UL (ref 1.5–7.7)
NEUTROPHILS NFR BLD AUTO: 54.9 %
NITRITE UR QL STRIP.AUTO: NEGATIVE
NONHDLC SERPL-MCNC: 82 MG/DL (ref ?–130)
OSMOLALITY SERPL CALC.SUM OF ELEC: 294 MOSM/KG (ref 275–295)
PH UR: 7.5 [PH] (ref 5–8)
PLATELET # BLD AUTO: 214 10(3)UL (ref 150–450)
POTASSIUM SERPL-SCNC: 3.8 MMOL/L (ref 3.5–5.1)
PROT SERPL-MCNC: 7.4 G/DL (ref 5.7–8.2)
PROT UR-MCNC: NEGATIVE MG/DL
RBC # BLD AUTO: 3.96 X10(6)UL
SODIUM SERPL-SCNC: 143 MMOL/L (ref 136–145)
SP GR UR STRIP: 1.02 (ref 1–1.03)
TRIGL SERPL-MCNC: 51 MG/DL (ref 30–149)
TSI SER-ACNC: 0.63 MIU/ML (ref 0.55–4.78)
UROBILINOGEN UR STRIP-ACNC: NORMAL
VLDLC SERPL CALC-MCNC: 8 MG/DL (ref 0–30)
WBC # BLD AUTO: 4.3 X10(3) UL (ref 4–11)

## 2024-06-21 PROCEDURE — 80061 LIPID PANEL: CPT

## 2024-06-21 PROCEDURE — 85025 COMPLETE CBC W/AUTO DIFF WBC: CPT

## 2024-06-21 PROCEDURE — 81001 URINALYSIS AUTO W/SCOPE: CPT

## 2024-06-21 PROCEDURE — 84443 ASSAY THYROID STIM HORMONE: CPT

## 2024-06-21 PROCEDURE — 82043 UR ALBUMIN QUANTITATIVE: CPT

## 2024-06-21 PROCEDURE — 36415 COLL VENOUS BLD VENIPUNCTURE: CPT

## 2024-06-21 PROCEDURE — 82570 ASSAY OF URINE CREATININE: CPT

## 2024-06-21 PROCEDURE — 80053 COMPREHEN METABOLIC PANEL: CPT

## 2024-06-21 PROCEDURE — 83036 HEMOGLOBIN GLYCOSYLATED A1C: CPT

## 2024-06-21 NOTE — PROGRESS NOTES
Subjective:   Felicia Velez is a 73 year old female who presents for a Medicare Subsequent Annual Wellness visit (Pt already had Initial Annual Wellness) and scheduled follow up of multiple significant but stable problems.       History/Other:   Fall Risk Assessment:   She has been screened for Falls and is High Risk. Fall Prevention information provided to patient in After Visit Summary.    Do you feel unsteady when standing or walking?: Yes  Do you worry about falling?: Yes  Have you fallen in the past year?: No     Cognitive Assessment:   She had a completely normal cognitive assessment - see flowsheet entries       Functional Ability/Status:   Felicia Velez has some abnormal functions as listed below:  She has Driving difficulties based on screening of functional status. She has Meal Preparation difficulties based on screening of functional status.She has difficulties Managing Money/Bills based on screening of functional status.She has difficulties Shopping for Groceries based on screening of functional status. She has difficulties Affording Meds based on screening of functional status. She has Vision problems based on screening of functional status. She has Walking problems based on screening of functional status. She has problems with Daily Activities based on screening of functional status. She has problems with Memory based on screening of functional status.       Depression Screening (PHQ-2/PHQ-9): PHQ-2 SCORE: 0  , done 6/21/2024             Advanced Directives:   She does NOT have a Living Will. [Do you have a living will?: No]  She does NOT have a Power of  for Health Care. [Do you have a healthcare power of ?: No]  Discussed Advance Care Planning with patient (and family/surrogate if present). Standard forms made available to patient in After Visit Summary.      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    Current Medications:  Outpatient Medications Marked as Taking for the 6/21/24 encounter (Office Visit) with Cynthia Nelson MD   Medication Sig    ACETAMINOPHEN-CODEINE 300-30 MG Oral Tab TAKE 1 TABLET BY MOUTH EVERY 8 HOURS AS NEEDED    MELOXICAM 7.5 MG Oral Tab TAKE 1 TABLET(7.5 MG) BY MOUTH DAILY    NORTRIPTYLINE 25 MG Oral Cap TAKE 1 CAPSULE BY MOUTH EVERY NIGHT AT BEDTIME    Blood Glucose Monitoring Suppl (ACCU-CHEK GUIDE) w/Device Does not apply Kit 1 kit by Other route daily.    Glucose Blood (ACCU-CHEK GUIDE) In Vitro Strip 1 strip by In Vitro route daily.    empagliflozin (JARDIANCE) 10 MG Oral Tab TAKE 1 TABLET(10 MG) BY MOUTH DAILY    topiramate 25 MG Oral Tab Take 1 tablet (25 mg total) by mouth 2 (two) times daily.    OMEPRAZOLE 20 MG Oral Capsule Delayed Release TAKE 1 CAPSULE BY MOUTH EVERY MORNING AND 1 CAPSULE BY MOUTH EVERY EVENING. TAKE BEFORE MEALS.    FAMOTIDINE 40 MG Oral Tab TAKE 1 TABLET(40 MG) BY MOUTH EVERY NIGHT    spironolactone 25 MG Oral Tab Take 1 tablet (25 mg total) by mouth daily.    topiramate 50 MG Oral Tab Take 1 tablet (50 mg total) by mouth 2 (two) times daily.     metFORMIN HCl 1000 MG Oral Tab Take 1 tablet (1,000 mg total) by mouth 2 (two) times daily.    pravastatin 20 MG Oral Tab Take 1 tablet (20 mg total) by mouth every evening.    pregabalin 150 MG Oral Cap Take 1 capsule (150 mg total) by mouth 2 (two) times daily.    dicyclomine 10 MG Oral Cap Take 1 capsule (10 mg total) by mouth nightly as needed.    HYDROCHLOROTHIAZIDE OR     OneTouch Delica Lancets 33G Does not apply Misc USE DAILY AS DIRECTED    ONETOUCH ULTRA BLUE In Vitro Strip TEST SUGAR DAILY    aspirin (ASPIR-81) 81 MG Oral Tab EC Take 1 tablet (81 mg total) by mouth daily.     Current Facility-Administered Medications for the 24 encounter (Office Visit) with Cynthia Nelson MD   Medication    cyanocobalamin (VITAMIN B12) 1000 MCG/ML injection 1,000 mcg    cyanocobalamin (VITAMIN B12) 1000 MCG/ML injection 1,000 mcg       Medical History:  She  has a past medical history of Abdominal hernia, Anxiety, Arthritis, Back pain, Blurred vision, Change in hair, Colitis, Depression, DIABETES, Diabetes mellitus (HCC), Esophageal reflux, Essential hypertension, benign (2013), Food intolerance, Heartburn, Indigestion, Lipid screening (2012), Low blood potassium, Measles without mention of complication, Mumps without mention of complication, Night sweats, Osteoarthrosis, unspecified whether generalized or localized, unspecified site, Other and unspecified hyperlipidemia, OTHER DISEASES, Pain in joints, Pain with bowel movements, Pure hypercholesterolemia (2013), Type II or unspecified type diabetes mellitus without mention of complication, not stated as uncontrolled, Unspecified essential hypertension, Varicella without mention of complication, Visual impairment, and Wears glasses.  Surgical History:  She  has a past surgical history that includes ; hernia surgery; colonoscopy (2012); ; hemorrhoidectomy; hernia surgery; laminectomy,lumbar; knee replacement surgery (2010,  2010); other surgical history; colonoscopy (N/A, 2022); back surgery; and hemorrhoidectomy,int/ext,simple.   Family History:  Her family history includes Bone Cancer in an other family member; Breast Cancer (age of onset: 60) in her maternal aunt; Cancer in her maternal grandfather, maternal grandmother, and paternal grandmother; Hypertension in her mother; Lipids in her mother; Prostate Cancer in an other family member.  Social History:  She  reports that she quit smoking about 9 years ago. Her smoking use included cigarettes. She started smoking about 21 years ago. She has a 3.6 pack-year smoking history. She has never used smokeless tobacco. She reports that she does not drink alcohol and does not use drugs.    Tobacco:  She smoked tobacco in the past but quit greater than 12 months ago.  Social History     Tobacco Use   Smoking Status Former    Current packs/day: 0.00    Average packs/day: 0.3 packs/day for 12.0 years (3.6 ttl pk-yrs)    Types: Cigarettes    Start date: 2003    Quit date: 2015    Years since quittin.4   Smokeless Tobacco Never        CAGE Alcohol Screen:   CAGE screening score of 0 on 2024, showing low risk of alcohol abuse.      Patient Care Team:  Cynthia Nelson MD as PCP - General (Family Practice)  Bobby Stanton RD (Dietitian)  Abigail Walden MD (Internal Medicine)  Desiree Hu APRN (Nurse Practitioner)  Libia Michael PT as Physical Therapist    Review of Systems  GENERAL: feels well otherwise  SKIN: denies rash  EYES: last appt Dr Pretty in the fall    HEENT: denies nasal congestion, sinus pain or ST  LUNGS: denies shortness of breath  CARDIOVASCULAR: denies chest pain  GI: denies abdominal pain  : denies dysuria   MUSCULOSKELETAL: h/o back pain  NEURO: denies headaches  PSYCHE: denies depression or anxiety  HEMATOLOGIC: denies hx of anemia  ENDOCRINE: denies thyroid history  ALL/ASTHMA: denies  asthma    Objective:   Physical Exam  General Appearance:  Here w       In WC    appears stated age   Head:  Normocephalic, without obvious abnormality, atraumatic                    Neck: Supple, symmetrical, trachea midline, no adenopathy;  thyroid: not enlarged, symmetric, no tenderness/mass/nodules; no  JVD        Lungs:   Clear to auscultation bilaterally, respirations unlabored   Heart:  Regular rate and rhythm, S1 and S2 normal, no murmur, rub, or gallop   Abdomen:   Soft, non-tender, bowel sounds active all four quadrants,  no masses, no organomegaly   Pelvic: Deferred   Extremities: Bilateral barefoot skin diabetic exam is normal, visualized feet and the appearance is normal.  Bilateral monofilament/sensation of both feet is normal.  Pulsation pedal pulse exam of both lower legs/feet is normal as well.   Pulses: 2+ and symmetric   Skin:  no rashes    Lymph nodes: Cervical, supraclavicular nodes normal   Neurologic: Normal       /60   Pulse 80   Temp 97.8 °F (36.6 °C) (Temporal)   Ht 4' 10\" (1.473 m)   Wt 205 lb 9.6 oz (93.3 kg)   SpO2 97%   BMI 42.97 kg/m²  Estimated body mass index is 42.97 kg/m² as calculated from the following:    Height as of this encounter: 4' 10\" (1.473 m).    Weight as of this encounter: 205 lb 9.6 oz (93.3 kg).    Medicare Hearing Assessment:   Hearing Screening    Screening Method: Finger Rub  Finger Rub Result: Pass         Visual Acuity:   Right Eye Visual Acuity: Corrected Right Eye Chart Acuity: 20/70   Left Eye Visual Acuity: Corrected Left Eye Chart Acuity: 20/70   Both Eyes Visual Acuity: Corrected Both Eyes Chart Acuity: 20/70   Able To Tolerate Visual Acuity: Yes        Assessment & Plan:   Felicia Velez is a 73 year old female who presents for a Medicare Assessment.   1. Type 2 diabetes mellitus with diabetic polyneuropathy, without long-term current use of insulin (HCC)  Labs ordered that are due     - Hemoglobin A1C; Future    2. Morbid obesity due to excess calories (HCC)  Discussed weight      has lost since the  beginning of the yr     3. Mild nonproliferative diabetic retinopathy associated with type 2 diabetes mellitus, macular edema presence unspecified, unspecified laterality (HCC)  Sees Dr Curran      - Microalb/Creat Ratio, Random Urine; Future  - Hemoglobin A1C; Future    4. Pure hypercholesterolemia  Check labs     - Lipid Panel; Future  - Assay, Thyroid Stim Hormone; Future    5. Essential hypertension, benign  Stable BP    CPM      - CBC With Differential With Platelet; Future  - Comp Metabolic Panel (14); Future  - Assay, Thyroid Stim Hormone; Future  - Urinalysis with Culture Reflex; Future    6. Spinal stenosis of lumbar region with neurogenic claudication  Noted on imaging    uses cane  T#3       7. NAFLD (nonalcoholic fatty liver disease)  Follow LFTa       8. Gastroesophageal reflux disease without esophagitis  On PPI        9. Gastritis and gastroduodenitis  On PPI        10. Asymptomatic gallstones  Noted on US   Follow        11. Postlaminectomy syndrome, lumbar region  Used T#3        12. Chronic pain of right knee  T#3 prn    Seeing Dr Dai  May need revision of the TKA     13. Preglaucoma, unspecified, bilateral  Sees Dr Curran        14. Nonexudative age-related macular degeneration, unspecified laterality, unspecified stage  Per Dr Curran        15. Glaucomatous atrophy of optic disc, unspecified laterality  Per Dr Curran        16. Age-related cataract of both eyes, unspecified age-related cataract type  Per Dr Curran        17. Colon cancer screening  Reminded to see GI       1. Type 2 diabetes mellitus with diabetic polyneuropathy, without long-term current use of insulin (HCC) (Primary)  -     Hemoglobin A1C; Future; Expected date: 06/21/2024  2. Morbid obesity due to excess calories (HCC)  3. Mild nonproliferative diabetic retinopathy associated with type 2 diabetes mellitus, macular edema presence unspecified, unspecified laterality (HCC)  -     Microalb/Creat Ratio, Random Urine; Future; Expected date:  06/21/2024  -     Hemoglobin A1C; Future; Expected date: 06/21/2024  4. Pure hypercholesterolemia  -     Lipid Panel; Future; Expected date: 06/21/2024  -     Assay, Thyroid Stim Hormone; Future; Expected date: 06/21/2024  5. Essential hypertension, benign  -     CBC With Differential With Platelet; Future; Expected date: 06/21/2024  -     Comp Metabolic Panel (14); Future; Expected date: 06/21/2024  -     Assay, Thyroid Stim Hormone; Future; Expected date: 06/21/2024  -     Urinalysis with Culture Reflex; Future; Expected date: 06/21/2024  6. Spinal stenosis of lumbar region with neurogenic claudication  7. NAFLD (nonalcoholic fatty liver disease)  8. Gastroesophageal reflux disease without esophagitis  9. Gastritis and gastroduodenitis  10. Asymptomatic gallstones  11. Postlaminectomy syndrome, lumbar region  Overview:  Log Date: 07/01/2011      12. Chronic pain of right knee  -     Ortho Referral - In Network  13. Preglaucoma, unspecified, bilateral  -     Ophthalmology Referral - In Network  14. Nonexudative age-related macular degeneration, unspecified laterality, unspecified stage  -     Ophthalmology Referral - In Network  15. Glaucomatous atrophy of optic disc, unspecified laterality  -     Ophthalmology Referral - In Network  16. Age-related cataract of both eyes, unspecified age-related cataract type  -     Ophthalmology Referral - In Network  17. Colon cancer screening  18. Encounter for annual health examination    The patient indicates understanding of these issues and agrees to the plan.  Reinforced healthy diet, lifestyle, and exercise.      No follow-ups on file.     Cynthia Nelson MD, 6/21/2024     Supplementary Documentation:   General Health:  In the past six months, have you lost more than 10 pounds without trying?: 2 - No  Has your appetite been poor?: No  Type of Diet: Diabetic;Low Salt  How does the patient maintain a good energy level?: Other  How would you describe your daily physical  activity?: Light  How would you describe your current health state?: Poor  How do you maintain positive mental well-being?: Visiting Family;Visiting Friends  On a scale of 0 to 10, with 0 being no pain and 10 being severe pain, what is your pain level?: 9 - (Severe)  In the past six months, have you experienced urine leakage?: 0-No  At any time do you feel concerned for the safety/well-being of yourself and/or your children, in your home or elsewhere?: No  Have you had any immunizations at another office such as Influenza, Hepatitis B, Tetanus, or Pneumococcal?: No       Felicia Velez's SCREENING SCHEDULE   Tests on this list are recommended by your physician but may not be covered, or covered at this frequency, by your insurer.   Please check with your insurance carrier before scheduling to verify coverage.   PREVENTATIVE SERVICES FREQUENCY &  COVERAGE DETAILS LAST COMPLETION DATE   Diabetes Screening    Fasting Blood Sugar /  Glucose    One screening every 12 months if never tested or if previously tested but not diagnosed with pre-diabetes   One screening every 6 months if diagnosed with pre-diabetes Lab Results   Component Value Date    GLUCOSE 99 04/02/2014     (H) 11/09/2023        Cardiovascular Disease Screening    Lipid Panel  Cholesterol  Lipoprotein (HDL)  Triglycerides Covered every 5 years for all Medicare beneficiaries without apparent signs or symptoms of cardiovascular disease Lab Results   Component Value Date    CHOLEST 159 02/09/2023    HDL 93 (H) 02/09/2023    LDL 56 02/09/2023    TRIG 42 02/09/2023         Electrocardiogram (EKG)   Covered if needed at Welcome to Medicare, and non-screening if indicated for medical reasons 11/10/2023      Ultrasound Screening for Abdominal Aortic Aneurysm (AAA) Covered once in a lifetime for one of the following risk factors    Men who are 65-75 years old and have ever smoked    Anyone with a family history -     Colorectal Cancer Screening  Covered  for ages 50-85; only need ONE of the following:    Colonoscopy   Covered every 10 years    Covered every 2 years if patient is at high risk or previous colonoscopy was abnormal 03/02/2022    Health Maintenance   Topic Date Due    Colorectal Cancer Screening  03/02/2024       Flexible Sigmoidoscopy   Covered every 4 years -    Fecal Occult Blood Test Covered annually -   Bone Density Screening    Bone density screening    Covered every 2 years after age 65 if diagnosed with risk of osteoporosis or estrogen deficiency.    Covered yearly for long-term glucocorticoid medication use (Steroids) Last Dexa Scan:    XR DEXA BONE DENSITOMETRY (CPT=77080) 01/29/2019      No recommendations at this time   Pap and Pelvic    Pap   Covered every 2 years for women at normal risk; Annually if at high risk -  No recommendations at this time    Chlamydia Annually if high risk -  No recommendations at this time   Screening Mammogram    Mammogram     Recommend annually for all female patients aged 40 and older    One baseline mammogram covered for patients aged 35-39 12/16/2023    Health Maintenance   Topic Date Due    Mammogram  12/16/2024       Immunizations    Influenza Covered once per flu season  Please get every year 11/15/2023  No recommendations at this time    Pneumococcal Each vaccine ([id number] & Coevjfeyl31) covered once after 65 Prevnar 13: 05/04/2016    Ljwwxhozu13: 12/04/2017     No recommendations at this time    Hepatitis B One screening covered for patients with certain risk factors   -  No recommendations at this time    Tetanus Toxoid Not covered by Medicare Part B unless medically necessary (cut with metal); may be covered with your pharmacy prescription benefits -    Tetanus, Diptheria and Pertusis TD and TDaP Not covered by Medicare Part B -  No recommendations at this time    Zoster Not covered by Medicare Part B; may be covered with your pharmacy  prescription benefits -  Zoster Vaccines(1 of 2) Never done      Diabetes      Hemoglobin A1C Annually; if last result is elevated, may be asked to retest more frequently.    Medicare covers every 3 months Lab Results   Component Value Date     (H) 12/16/2023    A1C 6.3 (H) 12/16/2023       Hemoglobin A1C Test due on 06/16/2024    Creat/alb ratio Annually Lab Results   Component Value Date    MICROALBCREA 7.3 02/09/2023       LDL Annually Lab Results   Component Value Date    LDL 56 02/09/2023       Dilated Eye Exam Annually Last Diabetic Eye Exam:  No data recorded  No data recorded       Annual Monitoring of Persistent Medications (ACE/ARB, digoxin diuretics, anticonvulsants)    Potassium Annually Lab Results   Component Value Date    K 3.5 11/09/2023         Creatinine   Annually Lab Results   Component Value Date    CREATSERUM 1.07 (H) 11/09/2023         BUN Annually Lab Results   Component Value Date    BUN 15 11/09/2023       Drug Serum Conc Annually No results found for: \"DIGOXIN\", \"DIG\", \"VALP\"

## 2024-06-21 NOTE — PATIENT INSTRUCTIONS
Felicia Velez's SCREENING SCHEDULE   Tests on this list are recommended by your physician but may not be covered, or covered at this frequency, by your insurer.   Please check with your insurance carrier before scheduling to verify coverage.   PREVENTATIVE SERVICES FREQUENCY &  COVERAGE DETAILS LAST COMPLETION DATE   Diabetes Screening    Fasting Blood Sugar /  Glucose    One screening every 12 months if never tested or if previously tested but not diagnosed with pre-diabetes   One screening every 6 months if diagnosed with pre-diabetes Lab Results   Component Value Date    GLUCOSE 99 04/02/2014     (H) 11/09/2023        Cardiovascular Disease Screening    Lipid Panel  Cholesterol  Lipoprotein (HDL)  Triglycerides Covered every 5 years for all Medicare beneficiaries without apparent signs or symptoms of cardiovascular disease Lab Results   Component Value Date    CHOLEST 159 02/09/2023    HDL 93 (H) 02/09/2023    LDL 56 02/09/2023    TRIG 42 02/09/2023         Electrocardiogram (EKG)   Covered if needed at Welcome to Medicare, and non-screening if indicated for medical reasons 11/10/2023      Ultrasound Screening for Abdominal Aortic Aneurysm (AAA) Covered once in a lifetime for one of the following risk factors   • Men who are 65-75 years old and have ever smoked   • Anyone with a family history -     Colorectal Cancer Screening  Covered for ages 50-85; only need ONE of the following:    Colonoscopy   Covered every 10 years    Covered every 2 years if patient is at high risk or previous colonoscopy was abnormal 03/02/2022    Health Maintenance   Topic Date Due   • Colorectal Cancer Screening  03/02/2024       Flexible Sigmoidoscopy   Covered every 4 years -    Fecal Occult Blood Test Covered annually -   Bone Density Screening    Bone density screening    Covered every 2 years after age 65 if diagnosed with risk of osteoporosis or estrogen deficiency.    Covered yearly for long-term glucocorticoid  medication use (Steroids) Last Dexa Scan:    XR DEXA BONE DENSITOMETRY (CPT=77080) 01/29/2019      No recommendations at this time   Pap and Pelvic    Pap   Covered every 2 years for women at normal risk; Annually if at high risk -  No recommendations at this time    Chlamydia Annually if high risk -  No recommendations at this time   Screening Mammogram    Mammogram     Recommend annually for all female patients aged 40 and older    One baseline mammogram covered for patients aged 35-39 12/16/2023    Health Maintenance   Topic Date Due   • Mammogram  12/16/2024       Immunizations    Influenza Covered once per flu season  Please get every year 11/15/2023  No recommendations at this time    Pneumococcal Each vaccine (Djnowvj95 & Ukhicljrh51) covered once after 65 Prevnar 13: 05/04/2016    Luzwkpkro11: 12/04/2017     No recommendations at this time    Hepatitis B One screening covered for patients with certain risk factors   -  No recommendations at this time    Tetanus Toxoid Not covered by Medicare Part B unless medically necessary (cut with metal); may be covered with your pharmacy prescription benefits -    Tetanus, Diptheria and Pertusis TD and TDaP Not covered by Medicare Part B -  No recommendations at this time    Zoster Not covered by Medicare Part B; may be covered with your pharmacy  prescription benefits -  Zoster Vaccines(1 of 2) Never done     Diabetes      Hemoglobin A1C Annually; if last result is elevated, may be asked to retest more frequently.    Medicare covers every 3 months Lab Results   Component Value Date     (H) 12/16/2023    A1C 6.3 (H) 12/16/2023       Hemoglobin A1C Test due on 06/16/2024    Creat/alb ratio Annually Lab Results   Component Value Date    MICROALBCREA 7.3 02/09/2023       LDL Annually Lab Results   Component Value Date    LDL 56 02/09/2023       Dilated Eye Exam Annually [unfilled]     Annual Monitoring of Persistent Medications (ACE/ARB, digoxin diuretics,  anticonvulsants)    Potassium Annually Lab Results   Component Value Date    K 3.5 11/09/2023         Creatinine   Annually Lab Results   Component Value Date    CREATSERUM 1.07 (H) 11/09/2023         BUN Annually Lab Results   Component Value Date    BUN 15 11/09/2023       Drug Serum Conc Annually No results found for: \"DIGOXIN\", \"DIG\", \"VALP\"

## 2024-07-08 ENCOUNTER — TELEPHONE (OUTPATIENT)
Dept: INTERNAL MEDICINE CLINIC | Facility: CLINIC | Age: 74
End: 2024-07-08

## 2024-07-08 DIAGNOSIS — G89.29 CHRONIC PAIN OF RIGHT KNEE: ICD-10-CM

## 2024-07-08 DIAGNOSIS — M48.062 SPINAL STENOSIS OF LUMBAR REGION WITH NEUROGENIC CLAUDICATION: Primary | ICD-10-CM

## 2024-07-08 DIAGNOSIS — M25.561 CHRONIC PAIN OF RIGHT KNEE: ICD-10-CM

## 2024-07-08 DIAGNOSIS — R74.8 ELEVATED LIVER ENZYMES: Primary | ICD-10-CM

## 2024-07-08 DIAGNOSIS — M96.1 POSTLAMINECTOMY SYNDROME, LUMBAR REGION: ICD-10-CM

## 2024-07-08 NOTE — TELEPHONE ENCOUNTER
Patient states she would like a walker with wheels faxed to [facility] [phone]  Will need order and face sheet faxed.     Order pended.

## 2024-07-08 NOTE — TELEPHONE ENCOUNTER
Pt was provided order for a walker during her LOV.  She is requesting a new order for the walker to have wheels on it for more support.

## 2024-07-14 DIAGNOSIS — E11.42 TYPE 2 DIABETES MELLITUS WITH DIABETIC POLYNEUROPATHY, WITHOUT LONG-TERM CURRENT USE OF INSULIN (HCC): ICD-10-CM

## 2024-07-14 RX ORDER — PRAVASTATIN SODIUM 20 MG
20 TABLET ORAL EVERY EVENING
Qty: 90 TABLET | Refills: 0 | Status: SHIPPED | OUTPATIENT
Start: 2024-07-14

## 2024-07-14 RX ORDER — SPIRONOLACTONE 25 MG/1
25 TABLET ORAL DAILY
Qty: 90 TABLET | Refills: 0 | Status: SHIPPED | OUTPATIENT
Start: 2024-07-14

## 2024-07-19 RX ORDER — DICYCLOMINE HYDROCHLORIDE 10 MG/1
10 CAPSULE ORAL NIGHTLY PRN
Qty: 120 CAPSULE | Refills: 0 | Status: SHIPPED | OUTPATIENT
Start: 2024-07-19

## 2024-07-19 NOTE — TELEPHONE ENCOUNTER
Requesting    Name from pharmacy: DICYCLOMINE 10MG CAPSULES         Will file in chart as: DICYCLOMINE 10 MG Oral Cap    Sig: TAKE ONE CAPSULE BY MOUTH NIGHTLY AS NEEDED    Disp: 120 capsule    Refills: 0 (Pharmacy requested: Not specified)    Start: 7/19/2024    Class: Normal    Non-formulary    Last ordered: 6 months ago (12/30/2023) by Cynthia Neslon MD    Last refill: 5/22/2024    Rx #: [id number]    Gastrointestional Medication Protocol [id number]/19/2024 06:33 AM   Protocol Details In person appointment or virtual visit in the past 12 mos or appointment in next 3 mos        LOV: 6/21/2024  RTC: none noted   Last Relevant Labs: n/a   Filled: 5/22/2024 #30 with 0 refills    No future appointments.

## 2024-07-21 NOTE — TELEPHONE ENCOUNTER
Patient called - LVM   Has questions regarding medication that was changed  ( farxiga and jardiance)
Spoke with patient who states that jardiance is not covered by her insurance. Informed patient that I already spoke with her about this issue on 7/18/18 and that she is to continue taking the 101 Dates      Patient states that she does not remember the con
5 (moderate pain)

## 2024-07-24 DIAGNOSIS — M96.1 POSTLAMINECTOMY SYNDROME, LUMBAR REGION: ICD-10-CM

## 2024-07-24 RX ORDER — ACETAMINOPHEN AND CODEINE PHOSPHATE 300; 30 MG/1; MG/1
1 TABLET ORAL EVERY 8 HOURS PRN
Qty: 100 TABLET | Refills: 0 | Status: SHIPPED | OUTPATIENT
Start: 2024-07-24

## 2024-07-24 NOTE — TELEPHONE ENCOUNTER
Requesting    Name from pharmacy: ACETAMINOPHEN/COD #3 (300/30MG) TAB         Will file in chart as: ACETAMINOPHEN-CODEINE 300-30 MG Oral Tab    Sig: TAKE 1 TABLET BY MOUTH EVERY 8 HOURS AS NEEDED    Disp: 100 tablet    Refills: 0 (Pharmacy requested: Not specified)    Start: 7/24/2024    Class: Normal    Non-formulary For: Postlaminectomy syndrome, lumbar region    Last ordered: 1 month ago (6/17/2024) by Cynthia Nelson MD    Last refill: 6/17/2024    Rx #: [id number]    Controlled Substance Medication Jztyrr7207/24/2024 12:46 PM    This medication is a controlled substance - forward to provider to refill        LOV: 6/17/2024  RTC: n/a   Last Relevant Labs: n/a   Filled: 6/17/2024 #100 with 0 refills    No future appointments.

## 2024-08-07 ENCOUNTER — TELEPHONE (OUTPATIENT)
Dept: INTERNAL MEDICINE CLINIC | Facility: CLINIC | Age: 74
End: 2024-08-07

## 2024-08-07 DIAGNOSIS — E11.42 TYPE 2 DIABETES MELLITUS WITH DIABETIC POLYNEUROPATHY, WITHOUT LONG-TERM CURRENT USE OF INSULIN (HCC): Primary | ICD-10-CM

## 2024-08-07 NOTE — TELEPHONE ENCOUNTER
[facility]  Ph [phone]  F [phone]    Patient has [employer] MA effective 8/1/24   New referral to [facility] with [employer] Authorization needed.  New referral placed #[id number]

## 2024-08-13 DIAGNOSIS — M25.561 ACUTE PAIN OF RIGHT KNEE: ICD-10-CM

## 2024-08-13 RX ORDER — TOPIRAMATE 25 MG/1
25 TABLET ORAL 2 TIMES DAILY
Qty: 180 TABLET | Refills: 0 | Status: SHIPPED | OUTPATIENT
Start: 2024-08-13

## 2024-08-14 RX ORDER — EMPAGLIFLOZIN 10 MG/1
10 TABLET, FILM COATED ORAL DAILY
Qty: 90 TABLET | Refills: 0 | OUTPATIENT
Start: 2024-08-14

## 2024-08-14 RX ORDER — MELOXICAM 7.5 MG/1
7.5 TABLET ORAL DAILY
Qty: 30 TABLET | Refills: 1 | Status: SHIPPED | OUTPATIENT
Start: 2024-08-14

## 2024-08-14 NOTE — TELEPHONE ENCOUNTER
Requesting    Name from pharmacy: JARDIANCE 10MG TABLETS         Will file in chart as: JARDIANCE 10 MG Oral Tab    The original prescription was discontinued on 7/8/2024 by Eliane Oakes RN for the following reason: Physician directed. Renewing this prescription may not be appropriate.    Sig: TAKE 1 TABLET(10 MG) BY MOUTH DAILY    Disp: 90 tablet    Refills: 0 (Pharmacy requested: Not specified)    Start: 8/13/2024    Class: Normal    Non-formulary    Last ordered: 2 months ago (5/23/2024) by Cynthia Nelson MD    Last refill: 5/23/2024    Rx #: [id number]    Diabetes Medication Protocol Lawdgg0408/13/2024 05:54 AM   Protocol Details Last A1C < 7.5 and within past 6 months    In person appointment or virtual visit in the past 6 mos or appointment in next 3 mos    Microalbumin procedure in past 12 months or taking ACE/ARB    EGFRCR or GFRAA > 50    GFR in the past 12 months       Name from pharmacy: MELOXICAM 7.5MG TABLETS         Will file in chart as: MELOXICAM 7.5 MG Oral Tab    Sig: TAKE 1 TABLET(7.5 MG) BY MOUTH DAILY    Disp: 30 tablet    Refills: 1 (Pharmacy requested: Not specified)    Start: 8/13/2024    Class: Normal    Non-formulary For: Acute pain of right knee    Last ordered: 1 month ago (6/17/2024) by Cynthia Nelson MD    Last refill: 7/14/2024    Rx #: [id number]    Non-Narcotic Pain Medication Protocol Ledpqh8808/13/2024 05:54 AM   Protocol Details In person appointment or virtual visit in the past 6 mos or appointment in next 3 mos        LOV: 6/21/2024    Last Relevant Labs: 6/21/2024  Filled: Meloxicam 6/17/2024 #30 with 1 refills    No future appointments.

## 2024-08-30 DIAGNOSIS — M96.1 POSTLAMINECTOMY SYNDROME, LUMBAR REGION: ICD-10-CM

## 2024-08-30 RX ORDER — ACETAMINOPHEN AND CODEINE PHOSPHATE 300; 30 MG/1; MG/1
1 TABLET ORAL EVERY 8 HOURS PRN
Qty: 100 TABLET | Refills: 0 | Status: SHIPPED | OUTPATIENT
Start: 2024-08-30

## 2024-08-30 NOTE — TELEPHONE ENCOUNTER
Requesting   Name from pharmacy: ACETAMINOPHEN/COD #3 (300/30MG) TAB          Will file in chart as: ACETAMINOPHEN-CODEINE 300-30 MG Oral Tab    Sig: TAKE 1 TABLET BY MOUTH EVERY 8 HOURS AS NEEDED    Disp: 100 tablet    Refills: 0 (Pharmacy requested: Not specified)    Start: 8/30/2024    Class: Normal    Non-formulary For: Postlaminectomy syndrome, lumbar region    Last ordered: 1 month ago (7/24/2024) by Cynthia Nelson MD    Last refill: 7/24/2024    Rx #: [id number]    Controlled Substance Medication Agyrkz4008/30/2024 12:26 PM    This medication is a controlled substance - forward to provider to refill        LOV: 6/21/2024  RTC: none noted   Last Relevant Labs: n/a   Filled: 7/24/2024 #100 with 0 refills    No future appointments.

## 2024-09-23 RX ORDER — NORTRIPTYLINE HCL 25 MG
CAPSULE ORAL
Qty: 90 CAPSULE | Refills: 0 | Status: SHIPPED | OUTPATIENT
Start: 2024-09-23

## 2024-09-23 NOTE — TELEPHONE ENCOUNTER
Requesting        Name from pharmacy: NORTRIPTYLINE 25MG CAPSULES          Will file in chart as: NORTRIPTYLINE 25 MG Oral Cap    Sig: TAKE 1 CAPSULE BY MOUTH EVERY NIGHT AT BEDTIME    Disp: 90 capsule    Refills: 0 (Pharmacy requested: Not specified)    Start: 9/22/2024    Class: Normal    Non-formulary    Last ordered: 3 months ago (6/17/2024) by Cynthia Nelson MD    Last refill: 6/17/2024    Rx #: [id number]            LOV: 6/21/2024  RTC: none noted   Last Relevant Labs: n/a   Filled: 6/17/2024 #90 with 0 refills    No future appointments.

## 2024-10-06 DIAGNOSIS — E11.42 TYPE 2 DIABETES MELLITUS WITH DIABETIC POLYNEUROPATHY, WITHOUT LONG-TERM CURRENT USE OF INSULIN (HCC): ICD-10-CM

## 2024-10-06 RX ORDER — PREGABALIN 150 MG/1
150 CAPSULE ORAL 2 TIMES DAILY
Qty: 180 CAPSULE | Refills: 0 | Status: SHIPPED | OUTPATIENT
Start: 2024-10-06

## 2024-10-09 ENCOUNTER — TELEPHONE (OUTPATIENT)
Dept: INTERNAL MEDICINE CLINIC | Facility: CLINIC | Age: 74
End: 2024-10-09

## 2024-10-09 NOTE — TELEPHONE ENCOUNTER
Pt requesting a call to discuss upcoming surgery. Pt seemed to be in tears from what I could hear over the phone. She didn't provide information, just requested to speak to nurse.F/u to discuss furthermore.

## 2024-10-11 NOTE — TELEPHONE ENCOUNTER
Pt called requesting referral for a provider that she was recommended by Dr. Dai. I informed pt if another provider is referring her out then referral should come from their staff. Pt v/u

## 2024-10-17 DIAGNOSIS — E11.42 TYPE 2 DIABETES MELLITUS WITH DIABETIC POLYNEUROPATHY, WITHOUT LONG-TERM CURRENT USE OF INSULIN (HCC): ICD-10-CM

## 2024-10-17 RX ORDER — PRAVASTATIN SODIUM 20 MG
20 TABLET ORAL EVERY EVENING
Qty: 90 TABLET | Refills: 0 | Status: SHIPPED | OUTPATIENT
Start: 2024-10-17

## 2024-10-17 RX ORDER — SPIRONOLACTONE 25 MG/1
25 TABLET ORAL DAILY
Qty: 90 TABLET | Refills: 0 | Status: SHIPPED | OUTPATIENT
Start: 2024-10-17

## 2024-10-17 RX ORDER — DICYCLOMINE HYDROCHLORIDE 10 MG/1
10 CAPSULE ORAL NIGHTLY PRN
Qty: 120 CAPSULE | Refills: 0 | Status: SHIPPED | OUTPATIENT
Start: 2024-10-17

## 2024-10-17 NOTE — TELEPHONE ENCOUNTER
Requesting    Name from pharmacy: PRAVASTATIN 20MG TABLETS         Will file in chart as: PRAVASTATIN 20 MG Oral Tab    Sig: TAKE 1 TABLET(20 MG) BY MOUTH EVERY EVENING    Disp: 90 tablet    Refills: 0 (Pharmacy requested: Not specified)    Start: 10/17/2024    Class: Normal    Non-formulary    Last ordered: 3 months ago (7/14/2024) by Cynthia Nelson MD    Last refill: 7/14/2024    Rx #: [id number]    Cholesterol Medication Protocol Qmgeza55/17/2024 05:57 AM   Protocol Details ALT < 80    ALT resulted within past year    Lipid panel within past 12 months    In person appointment or virtual visit in the past 12 mos or appointment in next 3 mos       Name from pharmacy: SPIRONOLACTONE 25MG TABLETS         Will file in chart as: SPIRONOLACTONE 25 MG Oral Tab    Sig: TAKE 1 TABLET(25 MG) BY MOUTH DAILY    Disp: 90 tablet    Refills: 0 (Pharmacy requested: Not specified)    Start: 10/17/2024    Class: Normal    Non-formulary    Last ordered: 3 months ago (7/14/2024) by Cynthia Nelson MD    Last refill: 7/14/2024    Rx #: [id number]    Hypertension Medications Protocol Oybdiz11/17/2024 05:57 AM   Protocol Details CMP or BMP in past 12 months    Last BP reading less than 140/90    In person appointment or virtual visit in the past 12 mos or appointment in next 3 mos    EGFRCR or GFRAA > 50       Name from pharmacy: METFORMIN 1000MG TABLETS         Will file in chart as: METFORMIN HCL 1000 MG Oral Tab    Sig: TAKE 1 TABLET(1000 MG) BY MOUTH TWICE DAILY    Disp: 180 tablet    Refills: 0 (Pharmacy requested: Not specified)    Start: 10/17/2024    Class: Normal    Non-formulary For: Type 2 diabetes mellitus with diabetic polyneuropathy, without long-term current use of insulin (HCC)    Last ordered: 3 months ago (7/14/2024) by Cynthia Nelson MD    Last refill: 7/14/2024    Rx #: [id number]    Diabetes Medication Protocol Ewuufk11/17/2024 05:57 AM   Protocol Details Last A1C < 7.5 and within past 6 months     In person appointment or virtual visit in the past 6 mos or appointment in next 3 mos    Microalbumin procedure in past 12 months or taking ACE/ARB    EGFRCR or GFRAA > 50    GFR in the past 12 months       Name from pharmacy: DICYCLOMINE 10MG CAPSULES         Will file in chart as: DICYCLOMINE 10 MG Oral Cap    Sig: TAKE ONE CAPSULE BY MOUTH NIGHTLY AS NEEDED    Disp: 120 capsule    Refills: 0 (Pharmacy requested: Not specified)    Start: 10/17/2024    Class: Normal    Non-formulary    Last ordered: 3 months ago (7/19/2024) by Cynthia Nelson MD    Last refill: 7/19/2024    Rx #: [id number]    Gastrointestional Medication Protocol Qpjdlc45/17/2024 05:57 AM   Protocol Details In person appointment or virtual visit in the past 12 mos or appointment in next 3 mos        LOV: 6/21/2024  RTC: 6 months   Last Relevant Labs: 6/21/2024  Filled:  Pravastatin- 7/16/2024 #90 with 0 refills  Spirolactone-7/6/2024 #90 with 0 refills  Metformin-7/22/2024 #180 with 0 refills  Dicyclomine-7/19/2024 #90 with 0 refills  Future Appointments   Date Time Provider Department Center   10/21/2024 11:30 AM JOINT CLASS PREOP PT None

## 2024-10-18 DIAGNOSIS — M96.1 POSTLAMINECTOMY SYNDROME, LUMBAR REGION: ICD-10-CM

## 2024-10-18 RX ORDER — ACETAMINOPHEN AND CODEINE PHOSPHATE 300; 30 MG/1; MG/1
1 TABLET ORAL EVERY 8 HOURS PRN
Qty: 100 TABLET | Refills: 0 | Status: SHIPPED | OUTPATIENT
Start: 2024-10-18

## 2024-10-18 NOTE — TELEPHONE ENCOUNTER
Requesting   Name from pharmacy: ACETAMINOPHEN/COD #3 (300/30MG) TAB          Will file in chart as: ACETAMINOPHEN-CODEINE 300-30 MG Oral Tab    Sig: TAKE 1 TABLET BY MOUTH EVERY 8 HOURS AS NEEDED    Disp: 100 tablet    Refills: 0 (Pharmacy requested: Not specified)    Start: 10/18/2024    Class: Normal    Non-formulary For: Postlaminectomy syndrome, lumbar region    Last ordered: 1 month ago (8/30/2024) by Cynthia Nelson MD    Last refill: 10/18/2024    Rx #: [id number]    Controlled Substance Medication Ejckbg78/18/2024 09:41 AM    This medication is a controlled substance - forward to provider to refill        LOV: 6/21/2024  RTC: none noted   Last Relevant Labs: n/a   Filled: 8/30/2024 #100 with 0 refills    Future Appointments   Date Time Provider Department Center   10/21/2024 11:30 AM JOINT CLASS PREOP PT None

## 2024-10-31 ENCOUNTER — TELEPHONE (OUTPATIENT)
Dept: INTERNAL MEDICINE CLINIC | Facility: CLINIC | Age: 74
End: 2024-10-31

## 2024-10-31 DIAGNOSIS — T84.032A LOOSENING OF PROSTHESIS OF RIGHT KNEE JOINT (HCC): Primary | ICD-10-CM

## 2024-10-31 NOTE — TELEPHONE ENCOUNTER
Pt requesting referral for   Zak Ayers DO    Orthopedic surgeon in [place], [place]  Address: [address] [place] [address], [place] [address]  Phone: [phone]    PT was seeing another Ortho but was told her procedure will be with this doctor.

## 2024-10-31 NOTE — TELEPHONE ENCOUNTER
TO: Pt is having procedure done with Dr. Zak Ayers with [facility]. Pended referral, please review and sign if agree, TY!

## 2024-11-14 ENCOUNTER — TELEPHONE (OUTPATIENT)
Dept: INTERNAL MEDICINE CLINIC | Facility: CLINIC | Age: 74
End: 2024-11-14

## 2024-11-14 NOTE — TELEPHONE ENCOUNTER
Incoming fax from [facility]    Patient is having Rt Revision total knee arthroplasty TBA @ [facility]  done by  on TBA     Patient is in need of the following-    CBC  CMP  UA w/ reflex  ESR  CRP  EKG  HBAIC  H&P  Medical Clearance       Paperwork placed in upcoming appointments folder on Megans' desk

## 2024-11-16 ENCOUNTER — LAB ENCOUNTER (OUTPATIENT)
Dept: LAB | Age: 74
End: 2024-11-16
Attending: FAMILY MEDICINE
Payer: MEDICARE

## 2024-11-16 ENCOUNTER — OFFICE VISIT (OUTPATIENT)
Dept: INTERNAL MEDICINE CLINIC | Facility: CLINIC | Age: 74
End: 2024-11-16
Payer: MEDICARE

## 2024-11-16 VITALS
HEART RATE: 78 BPM | WEIGHT: 198.81 LBS | DIASTOLIC BLOOD PRESSURE: 62 MMHG | BODY MASS INDEX: 40 KG/M2 | SYSTOLIC BLOOD PRESSURE: 120 MMHG | RESPIRATION RATE: 18 BRPM | TEMPERATURE: 97 F

## 2024-11-16 DIAGNOSIS — E11.42 TYPE 2 DIABETES MELLITUS WITH DIABETIC POLYNEUROPATHY, WITHOUT LONG-TERM CURRENT USE OF INSULIN (HCC): ICD-10-CM

## 2024-11-16 DIAGNOSIS — Z01.818 PREOP EXAMINATION: ICD-10-CM

## 2024-11-16 DIAGNOSIS — Z12.31 VISIT FOR SCREENING MAMMOGRAM: ICD-10-CM

## 2024-11-16 DIAGNOSIS — I10 ESSENTIAL HYPERTENSION, BENIGN: ICD-10-CM

## 2024-11-16 DIAGNOSIS — M17.11 PRIMARY OSTEOARTHRITIS OF RIGHT KNEE: ICD-10-CM

## 2024-11-16 DIAGNOSIS — R74.8 ELEVATED LIVER ENZYMES: ICD-10-CM

## 2024-11-16 DIAGNOSIS — Z01.818 PREOP EXAMINATION: Primary | ICD-10-CM

## 2024-11-16 LAB
ALBUMIN SERPL-MCNC: 3.6 G/DL (ref 3.2–4.8)
ALBUMIN/GLOB SERPL: 0.9 {RATIO} (ref 1–2)
ALP LIVER SERPL-CCNC: 164 U/L
ALT SERPL-CCNC: 73 U/L
ANION GAP SERPL CALC-SCNC: 3 MMOL/L (ref 0–18)
AST SERPL-CCNC: 124 U/L (ref ?–34)
BASOPHILS # BLD AUTO: 0.03 X10(3) UL (ref 0–0.2)
BASOPHILS NFR BLD AUTO: 0.6 %
BILIRUB SERPL-MCNC: 1.1 MG/DL (ref 0.2–1.1)
BILIRUB UR QL STRIP.AUTO: NEGATIVE
BUN BLD-MCNC: 9 MG/DL (ref 9–23)
CALCIUM BLD-MCNC: 9.9 MG/DL (ref 8.7–10.4)
CHLORIDE SERPL-SCNC: 112 MMOL/L (ref 98–112)
CO2 SERPL-SCNC: 25 MMOL/L (ref 21–32)
COLOR UR AUTO: YELLOW
CREAT BLD-MCNC: 0.79 MG/DL
EGFRCR SERPLBLD CKD-EPI 2021: 79 ML/MIN/1.73M2 (ref 60–?)
EOSINOPHIL # BLD AUTO: 0.04 X10(3) UL (ref 0–0.7)
EOSINOPHIL NFR BLD AUTO: 0.8 %
ERYTHROCYTE [DISTWIDTH] IN BLOOD BY AUTOMATED COUNT: 14.6 %
EST. AVERAGE GLUCOSE BLD GHB EST-MCNC: 100 MG/DL (ref 68–126)
FASTING STATUS PATIENT QL REPORTED: YES
GLOBULIN PLAS-MCNC: 4 G/DL (ref 2–3.5)
GLUCOSE BLD-MCNC: 86 MG/DL (ref 70–99)
GLUCOSE UR STRIP.AUTO-MCNC: NORMAL MG/DL
HBA1C MFR BLD: 5.1 % (ref ?–5.7)
HCT VFR BLD AUTO: 39.3 %
HGB BLD-MCNC: 12.8 G/DL
HYALINE CASTS #/AREA URNS AUTO: PRESENT /LPF
IMM GRANULOCYTES # BLD AUTO: 0.02 X10(3) UL (ref 0–1)
IMM GRANULOCYTES NFR BLD: 0.4 %
LEUKOCYTE ESTERASE UR QL STRIP.AUTO: NEGATIVE
LYMPHOCYTES # BLD AUTO: 1.25 X10(3) UL (ref 1–4)
LYMPHOCYTES NFR BLD AUTO: 25.9 %
MCH RBC QN AUTO: 31.8 PG (ref 26–34)
MCHC RBC AUTO-ENTMCNC: 32.6 G/DL (ref 31–37)
MCV RBC AUTO: 97.5 FL
MONOCYTES # BLD AUTO: 0.47 X10(3) UL (ref 0.1–1)
MONOCYTES NFR BLD AUTO: 9.7 %
NEUTROPHILS # BLD AUTO: 3.02 X10 (3) UL (ref 1.5–7.7)
NEUTROPHILS # BLD AUTO: 3.02 X10(3) UL (ref 1.5–7.7)
NEUTROPHILS NFR BLD AUTO: 62.6 %
NITRITE UR QL STRIP.AUTO: NEGATIVE
OSMOLALITY SERPL CALC.SUM OF ELEC: 288 MOSM/KG (ref 275–295)
PH UR STRIP.AUTO: 7 [PH] (ref 5–8)
PLATELET # BLD AUTO: 157 10(3)UL (ref 150–450)
POTASSIUM SERPL-SCNC: 3.8 MMOL/L (ref 3.5–5.1)
PROT SERPL-MCNC: 7.6 G/DL (ref 5.7–8.2)
PROT UR STRIP.AUTO-MCNC: 30 MG/DL
RBC # BLD AUTO: 4.03 X10(6)UL
RBC UR QL AUTO: NEGATIVE
SODIUM SERPL-SCNC: 140 MMOL/L (ref 136–145)
SP GR UR STRIP.AUTO: 1.02 (ref 1–1.03)
UROBILINOGEN UR STRIP.AUTO-MCNC: 6 MG/DL
WBC # BLD AUTO: 4.8 X10(3) UL (ref 4–11)

## 2024-11-16 PROCEDURE — 81001 URINALYSIS AUTO W/SCOPE: CPT

## 2024-11-16 PROCEDURE — 85025 COMPLETE CBC W/AUTO DIFF WBC: CPT

## 2024-11-16 PROCEDURE — 3061F NEG MICROALBUMINURIA REV: CPT | Performed by: FAMILY MEDICINE

## 2024-11-16 PROCEDURE — 36415 COLL VENOUS BLD VENIPUNCTURE: CPT

## 2024-11-16 PROCEDURE — 90662 IIV NO PRSV INCREASED AG IM: CPT | Performed by: FAMILY MEDICINE

## 2024-11-16 PROCEDURE — 3044F HG A1C LEVEL LT 7.0%: CPT | Performed by: FAMILY MEDICINE

## 2024-11-16 PROCEDURE — 3078F DIAST BP <80 MM HG: CPT | Performed by: FAMILY MEDICINE

## 2024-11-16 PROCEDURE — G0008 ADMIN INFLUENZA VIRUS VAC: HCPCS | Performed by: FAMILY MEDICINE

## 2024-11-16 PROCEDURE — 99214 OFFICE O/P EST MOD 30 MIN: CPT | Performed by: FAMILY MEDICINE

## 2024-11-16 PROCEDURE — 83036 HEMOGLOBIN GLYCOSYLATED A1C: CPT

## 2024-11-16 PROCEDURE — 3074F SYST BP LT 130 MM HG: CPT | Performed by: FAMILY MEDICINE

## 2024-11-16 PROCEDURE — 80053 COMPREHEN METABOLIC PANEL: CPT

## 2024-11-16 PROCEDURE — 1159F MED LIST DOCD IN RCRD: CPT | Performed by: FAMILY MEDICINE

## 2024-11-16 RX ORDER — MELOXICAM 7.5 MG/1
7.5 TABLET ORAL DAILY
Qty: 30 TABLET | Refills: 2 | Status: SHIPPED | OUTPATIENT
Start: 2024-11-16

## 2024-11-16 NOTE — PROGRESS NOTES
Felicia Velez is a 74 year old female.  HPI:   Pt is here for a preop exam for her R TKA     The previous hardware has loosened   Surgeon is Dr Ayers       Date and location is not known at this time    General is to be used per pt       No issues w it before      No URI   no CP   no SOB      Patient Active Problem List   Diagnosis    Gastritis and gastroduodenitis    Essential hypertension, benign    Postlaminectomy syndrome, lumbar region    Pure hypercholesterolemia    Type 2 diabetes mellitus with diabetic polyneuropathy, without long-term current use of insulin (HCC)    Morbid obesity due to excess calories (HCC)    Gastroesophageal reflux disease without esophagitis    Asymptomatic gallstones    NAFLD (nonalcoholic fatty liver disease)    Age-related cataract of both eyes    Glaucomatous optic atrophy    Mild nonproliferative diabetic retinopathy associated with type 2 diabetes mellitus (HCC)    Nonexudative age-related macular degeneration    Preglaucoma, unspecified, bilateral    Chronic pain of right knee    Spinal stenosis of lumbar region with neurogenic claudication       Current Outpatient Medications   Medication Sig Dispense Refill    ACETAMINOPHEN-CODEINE 300-30 MG Oral Tab TAKE 1 TABLET BY MOUTH EVERY 8 HOURS AS NEEDED 100 tablet 0    PRAVASTATIN 20 MG Oral Tab TAKE 1 TABLET(20 MG) BY MOUTH EVERY EVENING 90 tablet 0    SPIRONOLACTONE 25 MG Oral Tab TAKE 1 TABLET(25 MG) BY MOUTH DAILY 90 tablet 0    METFORMIN HCL 1000 MG Oral Tab TAKE 1 TABLET(1000 MG) BY MOUTH TWICE DAILY 180 tablet 0    DICYCLOMINE 10 MG Oral Cap TAKE ONE CAPSULE BY MOUTH NIGHTLY AS NEEDED 120 capsule 0    PREGABALIN 150 MG Oral Cap TAKE 1 CAPSULE(150 MG) BY MOUTH TWICE DAILY 180 capsule 0    NORTRIPTYLINE 25 MG Oral Cap TAKE 1 CAPSULE BY MOUTH EVERY NIGHT AT BEDTIME 90 capsule 0    OMEPRAZOLE 20 MG Oral Capsule Delayed Release TAKE 1 CAPSULE BY MOUTH EVERY MORNING AND 1 CAPSULE BY MOUTH EVERY EVENING. TAKE BEFORE MEALS. 60  capsule 0    TOPIRAMATE 25 MG Oral Tab TAKE 1 TABLET(25 MG) BY MOUTH TWICE DAILY 180 tablet 0    FAMOTIDINE 40 MG Oral Tab TAKE 1 TABLET(40 MG) BY MOUTH EVERY NIGHT 90 tablet 0    Blood Glucose Monitoring Suppl (ACCU-CHEK GUIDE) w/Device Does not apply Kit 1 kit by Other route daily. 1 kit 0    Glucose Blood (ACCU-CHEK GUIDE) In Vitro Strip 1 strip by In Vitro route daily. 100 strip 0    topiramate 50 MG Oral Tab Take 1 tablet (50 mg total) by mouth 2 (two) times daily. 180 tablet 0    OneTouch Delica Lancets 33G Does not apply Misc USE DAILY AS DIRECTED 100 each 0    ONETOUCH ULTRA BLUE In Vitro Strip TEST SUGAR DAILY 100 strip 0    aspirin (ASPIR-81) 81 MG Oral Tab EC Take 1 tablet (81 mg total) by mouth daily. 1 tablet 0    MELOXICAM 7.5 MG Oral Tab TAKE 1 TABLET(7.5 MG) BY MOUTH DAILY 30 tablet 1      Past Medical History:    Abdominal hernia    Anxiety    Arthritis    Back pain    Blurred vision    Change in hair    Colitis    Depression    DIABETES    Diabetes mellitus (HCC)    Esophageal reflux    Essential hypertension, benign    Food intolerance    Hearing impaired person, bilateral    Heartburn    High blood pressure    High cholesterol    Indigestion    Lipid screening    Low blood potassium    potassium is going up and down     Measles without mention of complication    Mumps without mention of complication    Night sweats    Osteoarthrosis, unspecified whether generalized or localized, unspecified site    Other and unspecified hyperlipidemia    OTHER DISEASES    bronchitis    Pain in joints    Pain with bowel movements    Pure hypercholesterolemia    Type II or unspecified type diabetes mellitus without mention of complication, not stated as uncontrolled    Unspecified essential hypertension    Varicella without mention of complication    Visual impairment    glasses    Wears glasses      Past Surgical History:   Procedure Laterality Date    Back surgery                  x3     Colonoscopy  2012    rck 10 yrs    Colonoscopy N/A 2022    Procedure: COLONOSCOPY,   ESOPHAGOGASTRODUODENOSCOPY with biopsies;  Surgeon: Gerri Upton DO;  Location:  ENDOSCOPY    Hemorrhoidectomy      Hemorrhoidectomy,int/ext,simple      Hernia surgery      Hernia surgery      abdominal hernia repair    Knee replacement surgery  2010, 2010    lt in 2010, rt in 2010    Laminectomy,lumbar      L4 L5    Other surgical history      anal fistulectomy     Social History:  Social History     Socioeconomic History    Marital status:    Tobacco Use    Smoking status: Former     Current packs/day: 0.00     Average packs/day: 0.3 packs/day for 12.0 years (3.6 ttl pk-yrs)     Types: Cigarettes     Start date: 2003     Quit date: 2015     Years since quittin.8    Smokeless tobacco: Never   Vaping Use    Vaping status: Never Used   Substance and Sexual Activity    Alcohol use: No     Alcohol/week: 0.0 standard drinks of alcohol    Drug use: No   Other Topics Concern    Caffeine Concern Yes     Comment: 0-1 cup daily.     Stress Concern Yes    Weight Concern Yes    Special Diet Yes     Comment: diabetic diet     Exercise No    Seat Belt Yes     Social Drivers of Health      Received from [facility]        REVIEW OF SYSTEMS:   GENERAL HEALTH: feels well otherwise  SKIN: denies rashes  RESPIRATORY: denies shortness of breath  CARDIOVASCULAR: denies chest pain  GI: denies abdominal pain   NEURO: denies headaches    EXAM:   /62 (BP Location: Right arm, Patient Position: Sitting, Cuff Size: adult)   Pulse 78   Temp 97.2 °F (36.2 °C) (Temporal)   Resp 18   Wt 198 lb 12.8 oz (90.2 kg)   BMI 40.15 kg/m²   GENERAL: well developed, well nourished,in no apparent distress  SKIN: no rashes  NECK: supple,no adenopathy  LUNGS: clear to auscultation  CARDIO: RRR without murmur  EXTREMITIES: no edema    ASSESSMENT AND PLAN:   1. Preop examination  She does  appear stble    BP good   check labs and EKG    No date or location is set up yet       - CBC With Differential With Platelet; Future  - Comp Metabolic Panel (14); Future  - EKG 12 Lead; Future  - Urinalysis with Culture Reflex; Future    2. Primary osteoarthritis of right knee  To have replacement of hardware    3. Type 2 diabetes mellitus with diabetic polyneuropathy, without long-term current use of insulin ([facility])  Check labs      get eye report from Dr Curran    - Hemoglobin A1C; Future    4. Essential hypertension, benign  BP good      - CBC With Differential With Platelet; Future  - Comp Metabolic Panel (14); Future    6. Visit for screening mammogram  MMG ordered      - [facility] LEONARDO 2D+3D SCREENING BILAT (CPT=77067/39067); Future     The patient indicates understanding of these issues and agrees to the plan.

## 2024-11-18 RX ORDER — TOPIRAMATE 25 MG/1
25 TABLET, FILM COATED ORAL 2 TIMES DAILY
Qty: 180 TABLET | Refills: 0 | Status: SHIPPED | OUTPATIENT
Start: 2024-11-18

## 2024-11-18 NOTE — TELEPHONE ENCOUNTER
Requesting    Name from pharmacy: TOPIRAMATE 25MG TABLETS         Will file in chart as: TOPIRAMATE 25 MG Oral Tab    Sig: TAKE 1 TABLET(25 MG) BY MOUTH TWICE DAILY    Disp: 180 tablet    Refills: 0 (Pharmacy requested: Not specified)    Start: 11/17/2024    Class: Normal    Non-formulary    Last ordered: 3 months ago (8/13/2024) by Cynthia Nelson MD    Last refill: 8/13/2024    Rx #: [id number]    Neurology Medications Vuysjk8711/17/2024 07:43 AM   Protocol Details In person appointment or virtual visit in the past 6 mos or appointment in next 3 mos           LOV: 11/16/2024  RTC: none noted   Last Relevant Labs: n/a   Filled: 8/13/2024 #180 with 0 refills    No future appointments.

## 2024-11-27 ENCOUNTER — TELEPHONE (OUTPATIENT)
Dept: INTERNAL MEDICINE CLINIC | Facility: CLINIC | Age: 74
End: 2024-11-27

## 2024-11-27 DIAGNOSIS — R74.8 ELEVATED LIVER ENZYMES: Primary | ICD-10-CM

## 2024-11-27 NOTE — TELEPHONE ENCOUNTER
Spoke with patient about lab results. Patient advised to schedule abdominal US. Order currently pended to clarify with Dr. Nelson. Verbalized understanding. Notified of other WNL labs.

## 2024-11-28 DIAGNOSIS — R74.8 ELEVATED LIVER ENZYMES: Primary | ICD-10-CM

## 2024-12-04 NOTE — TELEPHONE ENCOUNTER
Patient scheduled US abdomen.    Future Appointments   Date Time Provider Department Center   1/8/2025  8:20 AM PF SHEYLA RM1 PF MAMMO [place]   1/8/2025  9:00 AM PF US RM2 PF US [place]

## 2025-01-03 DIAGNOSIS — M96.1 POSTLAMINECTOMY SYNDROME, LUMBAR REGION: ICD-10-CM

## 2025-01-04 DIAGNOSIS — E11.42 TYPE 2 DIABETES MELLITUS WITH DIABETIC POLYNEUROPATHY, WITHOUT LONG-TERM CURRENT USE OF INSULIN (HCC): ICD-10-CM

## 2025-01-05 RX ORDER — CICLOPIROX 80 MG/ML
SOLUTION TOPICAL
Qty: 6.6 ML | Refills: 0 | OUTPATIENT
Start: 2025-01-05

## 2025-01-05 RX ORDER — ACETAMINOPHEN AND CODEINE PHOSPHATE 300; 30 MG/1; MG/1
1 TABLET ORAL EVERY 8 HOURS PRN
Qty: 100 TABLET | Refills: 0 | Status: SHIPPED | OUTPATIENT
Start: 2025-01-05

## 2025-01-05 RX ORDER — PREGABALIN 150 MG/1
150 CAPSULE ORAL 2 TIMES DAILY
Qty: 180 CAPSULE | Refills: 0 | Status: SHIPPED | OUTPATIENT
Start: 2025-01-05

## 2025-01-05 RX ORDER — DICYCLOMINE HYDROCHLORIDE 10 MG/1
10 CAPSULE ORAL NIGHTLY PRN
Qty: 120 CAPSULE | Refills: 0 | Status: SHIPPED | OUTPATIENT
Start: 2025-01-05

## 2025-01-08 ENCOUNTER — HOSPITAL ENCOUNTER (OUTPATIENT)
Dept: ULTRASOUND IMAGING | Age: 75
Discharge: HOME | End: 2025-01-08
Attending: FAMILY MEDICINE
Payer: MEDICARE

## 2025-01-08 ENCOUNTER — HOSPITAL ENCOUNTER (OUTPATIENT)
Dept: MAMMOGRAPHY | Age: 75
Discharge: HOME | End: 2025-01-08
Attending: FAMILY MEDICINE
Payer: MEDICARE

## 2025-01-08 DIAGNOSIS — Z12.31 VISIT FOR SCREENING MAMMOGRAM: ICD-10-CM

## 2025-01-08 DIAGNOSIS — R74.8 ELEVATED LIVER ENZYMES: ICD-10-CM

## 2025-01-08 PROCEDURE — 76700 US EXAM ABDOM COMPLETE: CPT | Performed by: FAMILY MEDICINE

## 2025-01-08 PROCEDURE — 77063 BREAST TOMOSYNTHESIS BI: CPT | Performed by: FAMILY MEDICINE

## 2025-01-08 PROCEDURE — 77067 SCR MAMMO BI INCL CAD: CPT | Performed by: FAMILY MEDICINE

## 2025-01-10 DIAGNOSIS — E11.42 TYPE 2 DIABETES MELLITUS WITH DIABETIC POLYNEUROPATHY, WITHOUT LONG-TERM CURRENT USE OF INSULIN (HCC): ICD-10-CM

## 2025-01-10 RX ORDER — PRAVASTATIN SODIUM 20 MG
20 TABLET ORAL EVERY EVENING
Qty: 90 TABLET | Refills: 0 | Status: SHIPPED | OUTPATIENT
Start: 2025-01-10

## 2025-01-10 RX ORDER — SPIRONOLACTONE 25 MG/1
25 TABLET ORAL DAILY
Qty: 90 TABLET | Refills: 0 | Status: SHIPPED | OUTPATIENT
Start: 2025-01-10

## 2025-01-10 RX ORDER — DICYCLOMINE HYDROCHLORIDE 10 MG/1
10 CAPSULE ORAL NIGHTLY PRN
Qty: 120 CAPSULE | Refills: 0 | OUTPATIENT
Start: 2025-01-10

## 2025-01-10 NOTE — TELEPHONE ENCOUNTER
Requesting   Name from pharmacy: PRAVASTATIN 20MG TABLETS          Will file in chart as: PRAVASTATIN 20 MG Oral Tab    Sig: TAKE 1 TABLET(20 MG) BY MOUTH EVERY EVENING    Disp: 90 tablet    Refills: 0 (Pharmacy requested: Not specified)    Start: 1/10/2025    Class: Normal    Non-formulary    Last ordered: 2 months ago (10/17/2024) by Cynthia Nelson MD    Last refill: 10/17/2024    Rx #: [id number]    Cholesterol Medication Protocol Kymcak18/10/2025 05:59 AM   Protocol Details ALT < 80    ALT resulted within past year    Lipid panel within past 12 months    In person appointment or virtual visit in the past 12 mos or appointment in next 3 mos    Medication is active on med list       Name from pharmacy: SPIRONOLACTONE 25MG TABLETS         Will file in chart as: SPIRONOLACTONE 25 MG Oral Tab    Sig: TAKE 1 TABLET(25 MG) BY MOUTH DAILY    Disp: 90 tablet    Refills: 0 (Pharmacy requested: Not specified)    Start: 1/10/2025    Class: Normal    Non-formulary    Last ordered: 2 months ago (10/17/2024) by Cynthia Nelson MD    Last refill: 10/17/2024    Rx #: [id number]    Hypertension Medications Protocol Idtmws76/10/2025 05:59 AM   Protocol Details CMP or BMP in past 12 months    Last BP reading less than 140/90    In person appointment or virtual visit in the past 12 mos or appointment in next 3 mos    EGFRCR or GFRAA > 50    Medication is active on med list       Name from pharmacy: METFORMIN 1000MG TABLETS         Will file in chart as: METFORMIN HCL 1000 MG Oral Tab    Sig: TAKE 1 TABLET(1000 MG) BY MOUTH TWICE DAILY    Disp: 180 tablet    Refills: 0 (Pharmacy requested: Not specified)    Start: 1/10/2025    Class: Normal    Non-formulary For: Type 2 diabetes mellitus with diabetic polyneuropathy, without long-term current use of insulin (HCC)    Last ordered: 2 months ago (10/17/2024) by Cynthia Nelson MD    Last refill: 10/17/2024    Rx #: [id number]    Diabetes Medication Protocol  Qqfqfy06/10/2025 05:59 AM   Protocol Details Last A1C < 7.5 and within past 6 months    In person appointment or virtual visit in the past 6 mos or appointment in next 3 mos    Microalbumin procedure in past 12 months or taking ACE/ARB    EGFRCR or GFRAA > 50    GFR in the past 12 months    Medication is active on med list       Name from pharmacy: DICYCLOMINE 10MG CAPSULES         Will file in chart as: DICYCLOMINE 10 MG Oral Cap     Possible duplicate: Hover to review recent actions on this medication    Sig: TAKE ONE CAPSULE BY MOUTH NIGHTLY AS NEEDED    Disp: 120 capsule    Refills: 0 (Pharmacy requested: Not specified)    Start: 1/10/2025    Class: Normal    Non-formulary    Last ordered: 5 days ago (1/5/2025) by Cynthia Nelson MD    Last refill: 10/17/2024    Rx #: [id number]    Gastrointestional Medication Protocol Znkllp24/10/2025 05:59 AM   Protocol Details In person appointment or virtual visit in the past 12 mos or appointment in next 3 mos    Medication is active on med list        LOV: 11/16/2024  RTC: none noted   Last Relevant Labs: 11/16/2024  Filled: 10/17/2024 #90 day with 0 refills    No future appointments.

## 2025-01-17 NOTE — TELEPHONE ENCOUNTER
Patient called Requesting RX refill.         OMEPRAZOLE 20 MG Oral Capsule Delayed Release 60 capsule           [id number] - [place], [place] - [address] AT [facility] [address] [facility], [phone], [phone]

## 2025-01-17 NOTE — TELEPHONE ENCOUNTER
Requesting    omeprazole 20 MG Oral Capsule Delayed Release         Sig: N/A    Disp: 60 capsule    Refills: 0    Start: 1/17/2025    Class: Normal    Non-formulary    Last ordered: 5 months ago (8/13/2024) by ANDREEA Mcclain    Gastrointestional Medication Protocol Oipggs4701/17/2025 12:44 PM   Protocol Details In person appointment or virtual visit in the past 12 mos or appointment in next 3 mos    Medication is active on med list        LOV: 11/16/2024  RTC: none noted   Last Relevant Labs: n/a   Filled: 11/6/2024 #60 with 0 refills    No future appointments.

## 2025-01-20 ENCOUNTER — TELEPHONE (OUTPATIENT)
Dept: INTERNAL MEDICINE CLINIC | Facility: CLINIC | Age: 75
End: 2025-01-20

## 2025-01-20 DIAGNOSIS — T84.032A MECHANICAL LOOSENING OF INTERNAL RIGHT KNEE PROSTHETIC JOINT, INITIAL ENCOUNTER (HCC): Primary | ICD-10-CM

## 2025-01-27 ENCOUNTER — EXTERNAL LAB (OUTPATIENT)
Dept: HEALTH INFORMATION MANAGEMENT | Facility: OTHER | Age: 75
End: 2025-01-27

## 2025-01-27 ENCOUNTER — TELEPHONE (OUTPATIENT)
Dept: INTERNAL MEDICINE CLINIC | Facility: CLINIC | Age: 75
End: 2025-01-27

## 2025-01-27 LAB
ALBUMIN SERPL-MCNC: 3.7 G/DL (ref 3.5–5.2)
ALP SERPL-CCNC: 201 U/L (ref 55–142)
ALT SERPL-CCNC: 132 U/L (ref 0–33)
APPEARANCE UR: CLEAR
AST SERPL-CCNC: 222 U/L (ref 0–32)
BACTERIA #/AREA URNS HPF: ABNORMAL /HPF
BASOPHILS # BLD: 0.01 10^3/UL (ref 0–0.1)
BASOPHILS NFR BLD: 0.3 %
BILIRUB SERPL-MCNC: 0.55 MG/DL (ref 0–1.2)
BILIRUB UR QL: NEGATIVE
BUN SERPL-MCNC: 26 MG/DL (ref 6–20)
CALCIUM SERPL-MCNC: 9.3 MG/DL (ref 8.6–10.3)
CALCIUM, CORRECTED: 9.5 MG/DL (ref 8.3–10.3)
CAOX CRY URNS QL MICRO: ABNORMAL /HPF
CHLORIDE SERPL-SCNC: 107 MMOL/L (ref 98–107)
CO2 SERPL-SCNC: 24.3 MMOL/L (ref 22–29)
COLOR UR: YELLOW
CREAT SERPL-MCNC: 1 MG/DL (ref 0.5–0.9)
EOSINOPHIL # BLD: 0.09 10^3/UL (ref 0–0.3)
EOSINOPHIL NFR BLD: 2.9 %
ERYTHROCYTE [DISTWIDTH] IN BLOOD: 14.6 % (ref 11.5–16)
GFR SERPLBLD SCHWARTZ-ARVRAT: 59.6 ML/MIN/1.73 SQUARE M
GLUCOSE SERPL-MCNC: 113 MG/DL (ref 74–109)
GLUCOSE UR-MCNC: NEGATIVE MG/DL
HCT VFR BLD CALC: 37.4 % (ref 34–50)
HGB BLD-MCNC: 11.6 G/DL (ref 12–16)
HGB UR QL: NEGATIVE
HYALINE CASTS #/AREA URNS LPF: ABNORMAL /LPF
KETONES UR-MCNC: NEGATIVE MG/DL
LAB RESULT: NORMAL
LENGTH OF FAST TIME PATIENT: NO H
LEUKOCYTE ESTERASE UR QL STRIP: ABNORMAL
LYMPHOCYTES # BLD: 1.11 10^3/UL (ref 0.9–4)
LYMPHOCYTES NFR BLD: 36.2 %
MCH RBC QN AUTO: 31.8 PG (ref 27–33.2)
MCHC RBC AUTO-ENTMCNC: 31 G/DL (ref 31–37)
MCV RBC AUTO: 102.5 FL (ref 81–100)
MONOCYTES # BLD: 0.36 10^3/UL (ref 0.1–1)
MONOCYTES NFR BLD: 11.7 %
MRSA DNA SPEC QL NAA+PROBE: NEGATIVE
NEUTROPHILS # BLD: 1.5 10^3/UL (ref 1.3–6.7)
NEUTROPHILS NFR BLD: 48.9 %
NITRITE UR QL: NEGATIVE
NRBC # BLD: 0 10^3/UL (ref 0–0.01)
NRBC BLD MANUAL-RTO: 0 %
PH UR: 7.5 [PH] (ref 5–8)
PLATELET # BLD: 146 10^3/UL (ref 150–450)
PMV BLD AUTO: 11.9 FL (ref 7–11.5)
POTASSIUM SERPL-SCNC: 5.8 MMOL/L (ref 3.5–5.2)
PROT SERPL-MCNC: 7.3 G/DL (ref 6.4–8.3)
PROT UR QL: NEGATIVE
RBC # BLD: 3.65 10^6/UL (ref 3.8–5.1)
RBC #/AREA URNS HPF: ABNORMAL /HPF
S AUREUS DNA SPEC QL NAA+PROBE: NEGATIVE
SODIUM SERPL-SCNC: 138 MMOL/L (ref 136–145)
SP GR UR: 1.02 (ref 1–1.03)
SQUAMOUS #/AREA URNS HPF: ABNORMAL /HPF
WBC # BLD: 3.07 10^3/UL (ref 4–13)
WBC #/AREA URNS HPF: ABNORMAL /HPF

## 2025-01-27 NOTE — TELEPHONE ENCOUNTER
Patient spouse walked in to drop off pre op paperwork given by Dr Ayers's office. Writer asked if surgery is currently scheduled, writer was informed that it is not currently scheduled. Informed to contact the office with the procedure date to schedule pre op appointment. Placed in provider fax folder for review.

## 2025-01-28 NOTE — TELEPHONE ENCOUNTER
Pre [facility] received  [facility]  requesting medical clearance   Forms placed in TO IN-basket For review

## 2025-02-06 ENCOUNTER — TELEPHONE (OUTPATIENT)
Dept: INTERNAL MEDICINE CLINIC | Facility: CLINIC | Age: 75
End: 2025-02-06

## 2025-02-06 NOTE — TELEPHONE ENCOUNTER
Megan from surgery scheduling dept. Called to schedule pt for an EKG prior to her surgery which is scheduled for 2/13. Says pt has already had all the required labs and only needs an EKG.    Megan [phone]

## 2025-02-07 ENCOUNTER — HOSPITAL ENCOUNTER (OUTPATIENT)
Age: 75
Setting detail: SURGERY ADMIT
End: 2025-02-07
Attending: STUDENT IN AN ORGANIZED HEALTH CARE EDUCATION/TRAINING PROGRAM | Admitting: STUDENT IN AN ORGANIZED HEALTH CARE EDUCATION/TRAINING PROGRAM

## 2025-02-07 ENCOUNTER — OFFICE VISIT (OUTPATIENT)
Dept: INTERNAL MEDICINE CLINIC | Facility: CLINIC | Age: 75
End: 2025-02-07
Payer: MEDICARE

## 2025-02-07 ENCOUNTER — EKG ENCOUNTER (OUTPATIENT)
Dept: LAB | Age: 75
End: 2025-02-07
Attending: FAMILY MEDICINE
Payer: MEDICARE

## 2025-02-07 ENCOUNTER — TELEPHONE (OUTPATIENT)
Dept: INTERNAL MEDICINE CLINIC | Facility: CLINIC | Age: 75
End: 2025-02-07

## 2025-02-07 VITALS
TEMPERATURE: 98 F | HEART RATE: 80 BPM | BODY MASS INDEX: 39.51 KG/M2 | SYSTOLIC BLOOD PRESSURE: 112 MMHG | OXYGEN SATURATION: 95 % | HEIGHT: 59 IN | WEIGHT: 196 LBS | DIASTOLIC BLOOD PRESSURE: 62 MMHG

## 2025-02-07 DIAGNOSIS — E11.42 TYPE 2 DIABETES MELLITUS WITH DIABETIC POLYNEUROPATHY, WITHOUT LONG-TERM CURRENT USE OF INSULIN (HCC): ICD-10-CM

## 2025-02-07 DIAGNOSIS — G89.29 CHRONIC PAIN OF RIGHT KNEE: ICD-10-CM

## 2025-02-07 DIAGNOSIS — M25.561 CHRONIC PAIN OF RIGHT KNEE: ICD-10-CM

## 2025-02-07 DIAGNOSIS — Z01.818 PREOP EXAMINATION: ICD-10-CM

## 2025-02-07 DIAGNOSIS — I10 ESSENTIAL HYPERTENSION, BENIGN: ICD-10-CM

## 2025-02-07 DIAGNOSIS — Z01.818 PREOP EXAMINATION: Primary | ICD-10-CM

## 2025-02-07 PROCEDURE — 3078F DIAST BP <80 MM HG: CPT | Performed by: FAMILY MEDICINE

## 2025-02-07 PROCEDURE — 3074F SYST BP LT 130 MM HG: CPT | Performed by: FAMILY MEDICINE

## 2025-02-07 PROCEDURE — 99214 OFFICE O/P EST MOD 30 MIN: CPT | Performed by: FAMILY MEDICINE

## 2025-02-07 PROCEDURE — 93005 ELECTROCARDIOGRAM TRACING: CPT

## 2025-02-07 PROCEDURE — 3008F BODY MASS INDEX DOCD: CPT | Performed by: FAMILY MEDICINE

## 2025-02-07 PROCEDURE — 93010 ELECTROCARDIOGRAM REPORT: CPT | Performed by: INTERNAL MEDICINE

## 2025-02-07 RX ORDER — NORTRIPTYLINE HYDROCHLORIDE 25 MG/1
CAPSULE ORAL
Qty: 90 CAPSULE | Refills: 2 | Status: SHIPPED | OUTPATIENT
Start: 2025-02-07

## 2025-02-07 NOTE — PROGRESS NOTES
Felicia Velez is a 74 year old female.  HPI:   Here for a preop exam for her upcoming R knee revision/arthroplasty under the care of Dr Ayers      Date is set for 2/12/25 at [facility]   General anesthesia to be used     No issues w anesthesia in the past    Needs EKG    No URI   No CP  no breathing issues   Is on meds as directed    BP is good   Sugars are good as well       Patient Active Problem List   Diagnosis    Gastritis and gastroduodenitis    Essential hypertension, benign    Postlaminectomy syndrome, lumbar region    Pure hypercholesterolemia    Type 2 diabetes mellitus with diabetic polyneuropathy, without long-term current use of insulin (HCC)    Morbid obesity due to excess calories (HCC)    Gastroesophageal reflux disease without esophagitis    Asymptomatic gallstones    NAFLD (nonalcoholic fatty liver disease)    Age-related cataract of both eyes    Glaucomatous optic atrophy    Mild nonproliferative diabetic retinopathy associated with type 2 diabetes mellitus (HCC)    Nonexudative age-related macular degeneration    Preglaucoma, unspecified, bilateral    Chronic pain of right knee    Spinal stenosis of lumbar region with neurogenic claudication       Current Outpatient Medications   Medication Sig Dispense Refill    omeprazole 20 MG Oral Capsule Delayed Release Take 1 capsule (20 mg total) by mouth 2 (two) times daily before meals. 60 capsule 5    PRAVASTATIN 20 MG Oral Tab TAKE 1 TABLET(20 MG) BY MOUTH EVERY EVENING 90 tablet 0    SPIRONOLACTONE 25 MG Oral Tab TAKE 1 TABLET(25 MG) BY MOUTH DAILY 90 tablet 0    METFORMIN HCL 1000 MG Oral Tab TAKE 1 TABLET(1000 MG) BY MOUTH TWICE DAILY 180 tablet 0    ACETAMINOPHEN-CODEINE 300-30 MG Oral Tab TAKE 1 TABLET BY MOUTH EVERY 8 HOURS AS NEEDED 100 tablet 0    dicyclomine 10 MG Oral Cap Take 1 capsule (10 mg total) by mouth nightly as needed. 120 capsule 0    pregabalin 150 MG Oral Cap Take 1 capsule (150 mg total) by mouth 2 (two) times daily.  180 capsule 0    TOPIRAMATE 25 MG Oral Tab TAKE 1 TABLET(25 MG) BY MOUTH TWICE DAILY 180 tablet 0    Meloxicam 7.5 MG Oral Tab Take 1 tablet (7.5 mg total) by mouth daily. 30 tablet 2    FAMOTIDINE 40 MG Oral Tab TAKE 1 TABLET(40 MG) BY MOUTH EVERY NIGHT 90 tablet 0    Blood Glucose Monitoring Suppl (ACCU-CHEK GUIDE) w/Device Does not apply Kit 1 kit by Other route daily. 1 kit 0    Glucose Blood (ACCU-CHEK GUIDE) In Vitro Strip 1 strip by In Vitro route daily. 100 strip 0    topiramate 50 MG Oral Tab Take 1 tablet (50 mg total) by mouth 2 (two) times daily. 180 tablet 0    OneTouch Delica Lancets 33G Does not apply Misc USE DAILY AS DIRECTED 100 each 0    ONETOUCH ULTRA BLUE In Vitro Strip TEST SUGAR DAILY 100 strip 0    aspirin (ASPIR-81) 81 MG Oral Tab EC Take 1 tablet (81 mg total) by mouth daily. 1 tablet 0    NORTRIPTYLINE 25 MG Oral Cap TAKE 1 CAPSULE BY MOUTH EVERY NIGHT AT BEDTIME (Patient not taking: Reported on 2/7/2025) 90 capsule 0      Past Medical History:    Abdominal hernia    Anxiety    Arthritis    Back pain    Blurred vision    Change in hair    Colitis    Depression    DIABETES    Diabetes mellitus (HCC)    Esophageal reflux    Essential hypertension, benign    Food intolerance    Hearing impaired person, bilateral    Heartburn    High blood pressure    High cholesterol    Indigestion    Lipid screening    Low blood potassium    potassium is going up and down     Measles without mention of complication    Mumps without mention of complication    Night sweats    Osteoarthrosis, unspecified whether generalized or localized, unspecified site    Other and unspecified hyperlipidemia    OTHER DISEASES    bronchitis    Pain in joints    Pain with bowel movements    Pure hypercholesterolemia    Type II or unspecified type diabetes mellitus without mention of complication, not stated as uncontrolled    Unspecified essential hypertension    Varicella without mention of complication    Visual impairment     glasses    Wears glasses      Past Surgical History:   Procedure Laterality Date    Back surgery                  x3    Colonoscopy  2012    rck 10 yrs    Colonoscopy N/A 2022    Procedure: COLONOSCOPY,   ESOPHAGOGASTRODUODENOSCOPY with biopsies;  Surgeon: Gerri Upton DO;  Location:  ENDOSCOPY    Hemorrhoidectomy      Hemorrhoidectomy,int/ext,simple      Hernia surgery      Hernia surgery      abdominal hernia repair    Knee replacement surgery  2010, 2010    lt in 2010, rt in 2010    Laminectomy,lumbar      L4 L5    Other surgical history      anal fistulectomy     Social History:  Social History     Socioeconomic History    Marital status:    Tobacco Use    Smoking status: Former     Current packs/day: 0.00     Average packs/day: 0.3 packs/day for 12.0 years (3.6 ttl pk-yrs)     Types: Cigarettes     Start date: 2003     Quit date: 2015     Years since quitting: 10.1    Smokeless tobacco: Never   Vaping Use    Vaping status: Never Used   Substance and Sexual Activity    Alcohol use: No     Alcohol/week: 0.0 standard drinks of alcohol    Drug use: No   Other Topics Concern    Caffeine Concern Yes     Comment: 0-1 cup daily.     Stress Concern Yes    Weight Concern Yes    Special Diet Yes     Comment: diabetic diet     Exercise No    Seat Belt Yes     Social Drivers of Health      [facility]        REVIEW OF SYSTEMS:   GENERAL HEALTH: feels well otherwise  SKIN: denies rashes  RESPIRATORY: denies shortness of breath  CARDIOVASCULAR: denies chest pain    EXAM:   /62 (BP Location: Left arm, Patient Position: Sitting, Cuff Size: large)   Pulse 80   Temp 98.2 °F (36.8 °C) (Temporal)   Ht 4' 11\" (1.499 m)   Wt 196 lb (88.9 kg)   SpO2 95%   BMI 39.59 kg/m²   GENERAL: well developed, well nourished,in no apparent distress  SKIN: no rashes  NECK: supple,no adenopathy  LUNGS: clear to auscultation  CARDIO:  RRR without murmur  EXTREMITIES: no edema    ASSESSMENT AND PLAN:   1. Preop examination  She does appear stable w her exam   BP good   Sugars are stable    Will check EKG and proceed   - EKG 12 Lead; Future    2. Type 2 diabetes mellitus with diabetic polyneuropathy, without long-term current use of insulin (HCC)  As above    last A1c 5.1        3. Essential hypertension, benign  BP good    CPM       4. Chronic pain of right knee  To have sx     The patient indicates understanding of these issues and agrees to the plan.    Addendum   The above is cleared for her procedure and may proceed at an acceptable risk foe complications

## 2025-02-07 NOTE — TELEPHONE ENCOUNTER
Patient called stating that her pharmacy informed her that her insurance will not cover the nortriptyline 25 MG Oral Cap, they are requesting that an alternative medication be sent to the [facility] pharmacy on file.

## 2025-02-08 LAB
ATRIAL RATE: 68 BPM
P AXIS: 44 DEGREES
P-R INTERVAL: 152 MS
Q-T INTERVAL: 390 MS
QRS DURATION: 90 MS
QTC CALCULATION (BEZET): 414 MS
R AXIS: 24 DEGREES
T AXIS: 43 DEGREES
VENTRICULAR RATE: 68 BPM

## 2025-02-10 NOTE — TELEPHONE ENCOUNTER
Spoke with pharmacist.   She stated that pt will need to call her insurance and ask what alternatives are on the formulary list.   With [employer] Rx coupon she could get Nortriptyline for $41.86     Spoke with pt, she does not want to pay $41 for the medication. Informed pt then she will need to call her insurance and get back to us with an alternative. She v/u. Will call us back once she knows.

## 2025-02-19 RX ORDER — NORTRIPTYLINE HYDROCHLORIDE 25 MG/1
CAPSULE ORAL
Qty: 90 CAPSULE | Refills: 2 | Status: CANCELLED | OUTPATIENT
Start: 2025-02-19

## 2025-02-19 NOTE — TELEPHONE ENCOUNTER
TO: Patient has not reached out to insurance yet, to find alternative, declining to pay discounted price for nortriptyline. Remove from med list or important to take? Please advise. TY    Spoke with patient, has not called insurance as of yet. To call today.

## 2025-02-26 ENCOUNTER — TELEPHONE (OUTPATIENT)
Dept: INTERNAL MEDICINE CLINIC | Facility: CLINIC | Age: 75
End: 2025-02-26

## 2025-02-26 DIAGNOSIS — E11.42 TYPE 2 DIABETES MELLITUS WITH DIABETIC POLYNEUROPATHY, WITHOUT LONG-TERM CURRENT USE OF INSULIN (HCC): Primary | ICD-10-CM

## 2025-02-26 NOTE — TELEPHONE ENCOUNTER
Pt is requesting for a urgent script for 'Accucheck fast clix lancets' since she has no lancets.    Pt would like a c/b when done.    [id number] - [place], [place] - [address] AT [facility] [address], [phone], [phone] [[id number]]

## 2025-02-27 RX ORDER — LANCETS
1 EACH MISCELLANEOUS DAILY
Qty: 100 EACH | Refills: 0 | Status: SHIPPED | OUTPATIENT
Start: 2025-02-27 | End: 2026-02-27

## 2025-03-03 RX ORDER — LANCETS
1 EACH MISCELLANEOUS DAILY
Qty: 100 EACH | Refills: 0 | Status: SHIPPED | OUTPATIENT
Start: 2025-03-03 | End: 2026-03-03

## 2025-03-05 ENCOUNTER — TELEPHONE (OUTPATIENT)
Dept: INTERNAL MEDICINE CLINIC | Facility: CLINIC | Age: 75
End: 2025-03-05

## 2025-03-05 NOTE — TELEPHONE ENCOUNTER
Pt is calling asking if TO can send a [facility] letting them know she is a diabetic this way she can get her flex card with them. They need something from the doctor to report this condition.

## 2025-03-06 ENCOUNTER — TELEPHONE (OUTPATIENT)
Dept: INTERNAL MEDICINE CLINIC | Facility: CLINIC | Age: 75
End: 2025-03-06

## 2025-03-06 NOTE — TELEPHONE ENCOUNTER
Incoming fax from [facility]    Patients in home visit notes from 2/9/2025     Placed in TO in basket for review

## 2025-03-07 NOTE — TELEPHONE ENCOUNTER
Spoke with patient, requested patient to provide contact information for OV notes to be faxed. Patient to call back.

## 2025-03-07 NOTE — TELEPHONE ENCOUNTER
TO: Please see below. Patient requesting note stating diagnosis of DM2. Ok to send LOV notes to [employer]? Please advise, TY     Type 2 diabetes mellitus with diabetic polyneuropathy, without long-term current use of insulin (HCC)

## 2025-03-09 DIAGNOSIS — M96.1 POSTLAMINECTOMY SYNDROME, LUMBAR REGION: ICD-10-CM

## 2025-03-10 ENCOUNTER — PATIENT OUTREACH (OUTPATIENT)
Dept: INTERNAL MEDICINE CLINIC | Facility: CLINIC | Age: 75
End: 2025-03-10

## 2025-03-10 RX ORDER — ACETAMINOPHEN AND CODEINE PHOSPHATE 300; 30 MG/1; MG/1
1 TABLET ORAL EVERY 8 HOURS PRN
Qty: 100 TABLET | Refills: 0 | Status: SHIPPED | OUTPATIENT
Start: 2025-03-10

## 2025-03-10 NOTE — TELEPHONE ENCOUNTER
Pt is returning call. Pt states that [employer] will not give her the fax number that we need to call for it. The number we are to call is [phone]

## 2025-03-10 NOTE — TELEPHONE ENCOUNTER
Requesting    Name from pharmacy: ACETAMINOPHEN/COD #3 (300/30MG) TAB         Will file in chart as: ACETAMINOPHEN-CODEINE 300-30 MG Oral Tab    Sig: TAKE 1 TABLET BY MOUTH EVERY 8 HOURS AS NEEDED    Disp: 100 tablet    Refills: 0 (Pharmacy requested: Not specified)    Start: 3/9/2025    Class: Normal    Non-formulary For: Postlaminectomy syndrome, lumbar region    Last ordered: 2 months ago (1/5/2025) by Cynthia Nelson MD    Last refill: 3/7/2025    Rx #: [id number]    Controlled Substance Medication Plozvb3103/09/2025 08:05 PM    This medication is a controlled substance - forward to provider to refill    Medication is active on med list        LOV: 2/7/2025  RTC: none noted   Last Relevant Labs: n/a   Filled: 1/5/2025 #100 with 0 refills    No future appointments.

## 2025-03-10 NOTE — TELEPHONE ENCOUNTER
Attempted to fax letter to: [phone]. Incorrect number. Patient will call office back with correct number.

## 2025-03-14 RX ORDER — MELOXICAM 7.5 MG/1
7.5 TABLET ORAL DAILY
Qty: 30 TABLET | Refills: 2 | Status: SHIPPED | OUTPATIENT
Start: 2025-03-14

## 2025-03-14 NOTE — TELEPHONE ENCOUNTER
Requesting    Name from pharmacy: MELOXICAM 7.5MG TABLETS         Will file in chart as: MELOXICAM 7.5 MG Oral Tab    Sig: TAKE 1 TABLET(7.5 MG) BY MOUTH DAILY    Disp: 30 tablet    Refills: 2 (Pharmacy requested: Not specified)    Start: 3/14/2025    Class: Normal    Non-formulary    Last ordered: 3 months ago (11/16/2024) by Cynthia Nelson MD    Last refill: 2/12/2025    Rx #: [id number]    Non-Narcotic Pain Medication Protocol Passed 03/14/2025 05:51 AM   Protocol Details In person appointment or virtual visit in the past 6 mos or appointment in next 3 mos    Medication is active on med list        LOV: 2/7/2025  RTC: none noted   Last Relevant Labs: n/a   Filled: 2/16/2025 #30 with 0 refills     No future appointments.

## 2025-04-03 ENCOUNTER — TELEPHONE (OUTPATIENT)
Dept: INTERNAL MEDICINE CLINIC | Facility: CLINIC | Age: 75
End: 2025-04-03

## 2025-04-03 NOTE — TELEPHONE ENCOUNTER
Patient calling to inform Dr. Nelson that she is currently in rehab facility. Insurance is pushing to discharge patient but she isnt comfortable going home yet because the blood cot is still not 100% better. Patient may or may not get discharged within a week.

## 2025-04-04 NOTE — TELEPHONE ENCOUNTER
TO: BRITTNI, patient wanted you to know she is in rehab and appealing decision to be discharged.    Currently [facility]. Blood clot in leg, appealing case to be sent home. Patient states the blood clot is not fully resolved and will hear back from insurance company tomorrow 4/5/2025.

## 2025-04-08 NOTE — TELEPHONE ENCOUNTER
Future Appointments   Date Time Provider Department Center   4/9/2025 10:30 AM Cynthia Nelson MD [facility] [place]

## 2025-04-09 ENCOUNTER — OFFICE VISIT (OUTPATIENT)
Dept: INTERNAL MEDICINE CLINIC | Facility: CLINIC | Age: 75
End: 2025-04-09
Payer: MEDICARE

## 2025-04-09 ENCOUNTER — LAB ENCOUNTER (OUTPATIENT)
Dept: LAB | Age: 75
End: 2025-04-09
Attending: FAMILY MEDICINE
Payer: MEDICARE

## 2025-04-09 VITALS
HEIGHT: 59 IN | HEART RATE: 78 BPM | SYSTOLIC BLOOD PRESSURE: 110 MMHG | BODY MASS INDEX: 40 KG/M2 | DIASTOLIC BLOOD PRESSURE: 66 MMHG

## 2025-04-09 DIAGNOSIS — E78.00 PURE HYPERCHOLESTEROLEMIA: ICD-10-CM

## 2025-04-09 DIAGNOSIS — D64.9 ANEMIA, UNSPECIFIED TYPE: ICD-10-CM

## 2025-04-09 DIAGNOSIS — M48.062 SPINAL STENOSIS OF LUMBAR REGION WITH NEUROGENIC CLAUDICATION: ICD-10-CM

## 2025-04-09 DIAGNOSIS — E11.42 TYPE 2 DIABETES MELLITUS WITH DIABETIC POLYNEUROPATHY, WITHOUT LONG-TERM CURRENT USE OF INSULIN (HCC): ICD-10-CM

## 2025-04-09 DIAGNOSIS — E66.01 MORBID OBESITY DUE TO EXCESS CALORIES (HCC): ICD-10-CM

## 2025-04-09 DIAGNOSIS — Z00.00 ENCOUNTER FOR ANNUAL HEALTH EXAMINATION: ICD-10-CM

## 2025-04-09 DIAGNOSIS — K80.20 ASYMPTOMATIC GALLSTONES: ICD-10-CM

## 2025-04-09 DIAGNOSIS — H47.239 GLAUCOMATOUS ATROPHY OF OPTIC DISC, UNSPECIFIED LATERALITY: ICD-10-CM

## 2025-04-09 DIAGNOSIS — I82.4Z1 ACUTE DEEP VEIN THROMBOSIS (DVT) OF DISTAL VEIN OF RIGHT LOWER EXTREMITY (HCC): ICD-10-CM

## 2025-04-09 DIAGNOSIS — G89.29 CHRONIC PAIN OF RIGHT KNEE: Primary | ICD-10-CM

## 2025-04-09 DIAGNOSIS — H25.9 AGE-RELATED CATARACT OF BOTH EYES, UNSPECIFIED AGE-RELATED CATARACT TYPE: ICD-10-CM

## 2025-04-09 DIAGNOSIS — H35.3190 NONEXUDATIVE AGE-RELATED MACULAR DEGENERATION, UNSPECIFIED LATERALITY, UNSPECIFIED STAGE: ICD-10-CM

## 2025-04-09 DIAGNOSIS — I15.2 HYPERTENSION ASSOCIATED WITH DIABETES (HCC): ICD-10-CM

## 2025-04-09 DIAGNOSIS — M25.561 CHRONIC PAIN OF RIGHT KNEE: Primary | ICD-10-CM

## 2025-04-09 DIAGNOSIS — E11.59 HYPERTENSION ASSOCIATED WITH DIABETES (HCC): ICD-10-CM

## 2025-04-09 DIAGNOSIS — K21.9 GASTROESOPHAGEAL REFLUX DISEASE WITHOUT ESOPHAGITIS: ICD-10-CM

## 2025-04-09 DIAGNOSIS — H40.003 PREGLAUCOMA, UNSPECIFIED, BILATERAL: ICD-10-CM

## 2025-04-09 DIAGNOSIS — K76.0 NAFLD (NONALCOHOLIC FATTY LIVER DISEASE): ICD-10-CM

## 2025-04-09 DIAGNOSIS — E11.3299 MILD NONPROLIFERATIVE DIABETIC RETINOPATHY ASSOCIATED WITH TYPE 2 DIABETES MELLITUS, MACULAR EDEMA PRESENCE UNSPECIFIED, UNSPECIFIED LATERALITY (HCC): ICD-10-CM

## 2025-04-09 LAB
ALBUMIN SERPL-MCNC: 3.5 G/DL (ref 3.2–4.8)
ALBUMIN/GLOB SERPL: 1.1 {RATIO} (ref 1–2)
ALP LIVER SERPL-CCNC: 148 U/L (ref 55–142)
ALT SERPL-CCNC: 50 U/L (ref 10–49)
ANION GAP SERPL CALC-SCNC: 10 MMOL/L (ref 0–18)
AST SERPL-CCNC: 124 U/L (ref ?–34)
BASOPHILS # BLD AUTO: 0.03 X10(3) UL (ref 0–0.2)
BASOPHILS NFR BLD AUTO: 0.7 %
BILIRUB SERPL-MCNC: 0.8 MG/DL (ref 0.2–1.1)
BILIRUB UR QL CFM: NEGATIVE
BUN BLD-MCNC: 15 MG/DL (ref 9–23)
CALCIUM BLD-MCNC: 9.2 MG/DL (ref 8.7–10.6)
CHLORIDE SERPL-SCNC: 109 MMOL/L (ref 98–112)
CHOLEST SERPL-MCNC: 122 MG/DL (ref ?–200)
CO2 SERPL-SCNC: 23 MMOL/L (ref 21–32)
COLOR UR AUTO: YELLOW
CREAT BLD-MCNC: 0.77 MG/DL (ref 0.55–1.02)
CREAT UR-SCNC: 316.7 MG/DL
EGFRCR SERPLBLD CKD-EPI 2021: 81 ML/MIN/1.73M2 (ref 60–?)
EOSINOPHIL # BLD AUTO: 0.08 X10(3) UL (ref 0–0.7)
EOSINOPHIL NFR BLD AUTO: 1.8 %
ERYTHROCYTE [DISTWIDTH] IN BLOOD BY AUTOMATED COUNT: 15 %
EST. AVERAGE GLUCOSE BLD GHB EST-MCNC: 97 MG/DL (ref 68–126)
FASTING PATIENT LIPID ANSWER: YES
FASTING STATUS PATIENT QL REPORTED: YES
GLOBULIN PLAS-MCNC: 3.3 G/DL (ref 2–3.5)
GLUCOSE BLD-MCNC: 72 MG/DL (ref 70–99)
GLUCOSE UR STRIP.AUTO-MCNC: NORMAL MG/DL
HBA1C MFR BLD: 5 % (ref ?–5.7)
HCT VFR BLD AUTO: 30.6 % (ref 35–48)
HDLC SERPL-MCNC: 63 MG/DL (ref 40–59)
HGB BLD-MCNC: 9.4 G/DL (ref 12–16)
HYALINE CASTS #/AREA URNS AUTO: PRESENT /LPF
IMM GRANULOCYTES # BLD AUTO: 0.01 X10(3) UL (ref 0–1)
IMM GRANULOCYTES NFR BLD: 0.2 %
KETONES UR STRIP.AUTO-MCNC: NEGATIVE MG/DL
LDLC SERPL CALC-MCNC: 47 MG/DL (ref ?–100)
LEUKOCYTE ESTERASE UR QL STRIP.AUTO: 75
LYMPHOCYTES # BLD AUTO: 1.49 X10(3) UL (ref 1–4)
LYMPHOCYTES NFR BLD AUTO: 34.2 %
MCH RBC QN AUTO: 30.4 PG (ref 26–34)
MCHC RBC AUTO-ENTMCNC: 30.7 G/DL (ref 31–37)
MCV RBC AUTO: 99 FL (ref 80–100)
MICROALBUMIN UR-MCNC: 1.1 MG/DL
MICROALBUMIN/CREAT 24H UR-RTO: 3.5 UG/MG (ref ?–30)
MONOCYTES # BLD AUTO: 0.42 X10(3) UL (ref 0.1–1)
MONOCYTES NFR BLD AUTO: 9.6 %
NEUTROPHILS # BLD AUTO: 2.33 X10 (3) UL (ref 1.5–7.7)
NEUTROPHILS # BLD AUTO: 2.33 X10(3) UL (ref 1.5–7.7)
NEUTROPHILS NFR BLD AUTO: 53.5 %
NITRITE UR QL STRIP.AUTO: NEGATIVE
NONHDLC SERPL-MCNC: 59 MG/DL (ref ?–130)
OSMOLALITY SERPL CALC.SUM OF ELEC: 293 MOSM/KG (ref 275–295)
PH UR STRIP.AUTO: 6.5 [PH] (ref 5–8)
PLATELET # BLD AUTO: 221 10(3)UL (ref 150–450)
POTASSIUM SERPL-SCNC: 4.1 MMOL/L (ref 3.5–5.1)
PROT SERPL-MCNC: 6.8 G/DL (ref 5.7–8.2)
PROT UR STRIP.AUTO-MCNC: 30 MG/DL
RBC # BLD AUTO: 3.09 X10(6)UL (ref 3.8–5.3)
RBC UR QL AUTO: NEGATIVE
SODIUM SERPL-SCNC: 142 MMOL/L (ref 136–145)
SP GR UR STRIP.AUTO: >1.03 (ref 1–1.03)
TRIGL SERPL-MCNC: 55 MG/DL (ref 30–149)
TSI SER-ACNC: 1.72 UIU/ML (ref 0.55–4.78)
UROBILINOGEN UR STRIP.AUTO-MCNC: 4 MG/DL
VLDLC SERPL CALC-MCNC: 8 MG/DL (ref 0–30)
WBC # BLD AUTO: 4.4 X10(3) UL (ref 4–11)

## 2025-04-09 PROCEDURE — 80061 LIPID PANEL: CPT

## 2025-04-09 PROCEDURE — 82570 ASSAY OF URINE CREATININE: CPT

## 2025-04-09 PROCEDURE — 36415 COLL VENOUS BLD VENIPUNCTURE: CPT

## 2025-04-09 PROCEDURE — 85025 COMPLETE CBC W/AUTO DIFF WBC: CPT

## 2025-04-09 PROCEDURE — 87086 URINE CULTURE/COLONY COUNT: CPT

## 2025-04-09 PROCEDURE — 81001 URINALYSIS AUTO W/SCOPE: CPT

## 2025-04-09 PROCEDURE — 80053 COMPREHEN METABOLIC PANEL: CPT

## 2025-04-09 PROCEDURE — 83036 HEMOGLOBIN GLYCOSYLATED A1C: CPT

## 2025-04-09 PROCEDURE — 82043 UR ALBUMIN QUANTITATIVE: CPT

## 2025-04-09 PROCEDURE — 84443 ASSAY THYROID STIM HORMONE: CPT

## 2025-04-09 RX ORDER — APIXABAN 5 MG/1
TABLET, FILM COATED ORAL EVERY 12 HOURS
COMMUNITY
Start: 2025-04-02 | End: 2025-07-10

## 2025-04-09 RX ORDER — HYDROCODONE BITARTRATE AND ACETAMINOPHEN 10; 325 MG/1; MG/1
TABLET ORAL
COMMUNITY
Start: 2025-03-22

## 2025-04-09 RX ORDER — DOXYCYCLINE 100 MG/1
CAPSULE ORAL
COMMUNITY
Start: 2025-03-18

## 2025-04-09 RX ORDER — CELECOXIB 100 MG/1
CAPSULE ORAL
COMMUNITY
Start: 2025-03-18 | End: 2025-04-09

## 2025-04-09 RX ORDER — DOCUSATE SODIUM 100 MG/1
CAPSULE, LIQUID FILLED ORAL
COMMUNITY
Start: 2025-03-19

## 2025-04-09 RX ORDER — OXYCODONE HYDROCHLORIDE 5 MG/1
5 TABLET ORAL
COMMUNITY
Start: 2025-03-04

## 2025-04-09 NOTE — PROGRESS NOTES
Subjective:   Felicia Velez is a 74 year old female who presents for a MA AHA (Medicare Advantage Annual Health Assessment) and Subsequent Annual Wellness visit (Pt already had Initial Annual Wellness) and scheduled follow up of multiple significant but stable problems.   History of Present Illness              History/Other:   Fall Risk Assessment:   She has been screened for Falls and is High Risk. Fall Prevention information provided to patient in After Visit Summary.    Do you feel unsteady when standing or walking?: Yes  Do you worry about falling?: Yes  Have you fallen in the past year?: Yes     Cognitive Assessment:   She had a completely normal cognitive assessment - see flowsheet entries     Functional Ability/Status:   Felicia Velez has some abnormal functions as listed below:  She has Dressing and/or Bathing issues based on screening of functional status.  Difficulty dressing or bathing?: Yes  Bathing or Showering: Need some help  Dressing: Need some help  She has Driving difficulties based on screening of functional status. She has Meal Preparation difficulties based on screening of functional status.She has difficulties Managing Money/Bills based on screening of functional status.She has difficulties Shopping for Groceries based on screening of functional status. She has Vision problems based on screening of functional status. She has Walking problems based on screening of functional status. She has problems with Daily Activities based on screening of functional status. She has problems with Memory based on screening of functional status.       Depression Screening (PHQ):  PHQ-2 SCORE: 2  , done 4/9/2025   Little interest or pleasure in doing things: 1    Feeling down, depressed, or hopeless: 1              Advanced Directives:   She does NOT have a Living Will. [Do you have a living will?: No]  She does NOT have a Power of  for Health Care. [Do you have a healthcare power of ?:  No]  Discussed Advance Care Planning with patient (and family/surrogate if present). Standard forms made available to patient in After Visit Summary.      Problem List[1]  Allergies:  She is allergic to compound iodine solution [iodine (topical)], fish-derived products, shellfish, and zithromax.    Current Medications:  Active Meds, Sig Only[2]    Medical History:  She  has a past medical history of Abdominal hernia, Anxiety, Arthritis, Back pain, Blurred vision, Change in hair, Colitis, Depression, DIABETES, Diabetes mellitus (HCC), Esophageal reflux, Essential hypertension, benign (2013), Food intolerance, Hearing impaired person, bilateral, Heartburn, High blood pressure, High cholesterol, Indigestion, Lipid screening (2012), Low blood potassium, Measles without mention of complication, Mumps without mention of complication, Night sweats, Osteoarthrosis, unspecified whether generalized or localized, unspecified site, Other and unspecified hyperlipidemia, OTHER DISEASES, Pain in joints, Pain with bowel movements, Pure hypercholesterolemia (2013), Type II or unspecified type diabetes mellitus without mention of complication, not stated as uncontrolled, Unspecified essential hypertension, Varicella without mention of complication, Visual impairment, and Wears glasses.  Surgical History:  She  has a past surgical history that includes ; hernia surgery; colonoscopy (2012); ; hemorrhoidectomy; hernia surgery; laminectomy,lumbar; knee replacement surgery (2010, 2010); other surgical history; colonoscopy (N/A, 2022); back surgery; and hemorrhoidectomy,int/ext,simple.   Family History:  Her family history includes Bone Cancer in an other family member; Breast Cancer (age of onset: 60) in her maternal aunt; Cancer in her maternal grandfather, maternal grandmother, and paternal grandmother; Hypertension in her mother; Lipids in her mother; Prostate Cancer in an other  family member.  Social History:  She  reports that she quit smoking about 10 years ago. Her smoking use included cigarettes. She started smoking about 22 years ago. She has a 3.6 pack-year smoking history. She has never used smokeless tobacco. She reports that she does not drink alcohol and does not use drugs.    Tobacco:  She smoked tobacco in the past but quit greater than 12 months ago.  Tobacco Use[3]     CAGE Alcohol Screen:   CAGE screening score of 0 on 4/9/2025, showing low risk of alcohol abuse.      Patient Care Team:  Cynthia Nelson MD as PCP - General (Family Practice)  Bobby Stanton RD (Dietitian)  Abigail Walden MD (Internal Medicine)  Desiree Hu APRN (Nurse Practitioner)  Libia Michael PT as Physical Therapist    Review of Systems  GENERAL: feels well otherwise  SKIN: denies rash  EYES: denies blurred vision    HEENT: denies nasal congestion, sinus pain or ST  LUNGS: denies shortness of breath with exertion  CARDIOVASCULAR: denies chest pain    GI: denies abdominal pain,   : denies dysuria   MUSCULOSKELETAL: had R TKA    afterward had swelling in the RLE   had US that showed clot      on eliquis now     DC 2d ago   on pain meds    NEURO: denies headaches  PSYCHE: denies depression or anxiety  HEMATOLOGIC: hx of anemia  ENDOCRINE: denies thyroid history  ALL/ASTHMA: denies hx of allergy or asthma    Objective:   Physical Exam  General Appearance:  Alert, cooperative, no distress, appears stated age   Head:  Normocephalic, without obvious abnormality, atraumatic                    Neck: Supple, symmetrical, trachea midline, no adenopathy;  thyroid: not enlarged, symmetric, no tenderness/mass/nodules; no   JVD        Lungs:   Clear to auscultation bilaterally, respirations unlabored   Heart:  Regular rate and rhythm, S1 and S2 normal, no murmur, rub, or gallop   Abdomen:   Soft, non-tender, bowel sounds active all four quadrants,  no masses, no organomegaly   Pelvic: Deferred   Extremities:  Bilateral barefoot skin diabetic exam is normal, visualized feet and the appearance is normal.  Bilateral monofilament/sensation of both feet is normal.  Pulsation pedal pulse exam of both lower legs/feet is normal as well.   Pulses: 2+ and symmetric   Skin: Skin color, texture, turgor normal, no rashes or lesions   Lymph nodes: Cervical, supraclavicular, and axillary nodes normal   Neurologic: Normal       /66 (BP Location: Left arm, Patient Position: Sitting, Cuff Size: adult)   Pulse 78   Ht 4' 11\" (1.499 m)   BMI 39.59 kg/m²  Estimated body mass index is 39.59 kg/m² as calculated from the following:    Height as of this encounter: 4' 11\" (1.499 m).    Weight as of 2/7/25: 196 lb (88.9 kg).              Assessment & Plan:   Felicia Velez is a 74 year old female who presents for a Medicare Assessment.   1. Chronic pain of right knee  S/p revision     getting PT     has DVT    on eliquis now   pain meds prn        2. Mild nonproliferative diabetic retinopathy associated with type 2 diabetes mellitus, macular edema presence unspecified, unspecified laterality (HCC)  Sees Ophtho in August        has good DM control       3. Morbid obesity due to excess calories (HCC)  Unable to exercise now given recent sx      - Assay, Thyroid Stim Hormone; Future    4. Type 2 diabetes mellitus with diabetic polyneuropathy, without long-term current use of insulin (HCC)  On lyrica   has good control      - Microalb/Creat Ratio, Random Urine; Future  - Lipid Panel; Future  - Hemoglobin A1C; Future    5. Hypertension associated with diabetes (HCC)  BP ok     CPM w meds     - CBC With Differential With Platelet; Future  - Comp Metabolic Panel (14); Future  - Lipid Panel; Future  - Urinalysis with Culture Reflex; Future    6. Pure hypercholesterolemia  Check lipids        7. Spinal stenosis of lumbar region with neurogenic claudication  Appears comfortable from this perspective       8. Asymptomatic gallstones  No acute  issues       9. Gastroesophageal reflux disease without esophagitis  PPI      10. NAFLD (nonalcoholic fatty liver disease)  Has elevated LFTs     weight loss can help        11. Age-related cataract of both eyes, unspecified age-related cataract type  Per ophtho        12. Glaucomatous atrophy of optic disc, unspecified laterality  Per Ophtho         13. Nonexudative age-related macular degeneration, unspecified laterality, unspecified stage  Per ophtho        14. Preglaucoma, unspecified, bilateral  Per optho     15. Anemia, unspecified type  Check CBC today      - Expanded, Low Complexity (91426)    16. Acute deep vein thrombosis (DVT) of distal vein of right lower extremity (HCC)  D/w pt DVT     eliquis for 3 month    no CP or SOB     swelling in the RLE should get better w time     - Expanded, Low Complexity (55772)    17. Encounter for annual health examination  As above     1. Chronic pain of right knee (Primary)  2. Mild nonproliferative diabetic retinopathy associated with type 2 diabetes mellitus, macular edema presence unspecified, unspecified laterality ([facility])  3. Morbid obesity due to excess calories ([facility])  -     Assay, Thyroid Stim Hormone; Future; Expected date: 04/09/2025  4. Type 2 diabetes mellitus with diabetic polyneuropathy, without long-term current use of insulin ([facility])  -     Microalb/Creat Ratio, Random Urine; Future; Expected date: 04/09/2025  -     Lipid Panel; Future; Expected date: 04/09/2025  -     Hemoglobin A1C; Future; Expected date: 04/09/2025  5. Hypertension associated with diabetes ([facility])  -     CBC With Differential With Platelet; Future; Expected date: 04/09/2025  -     Comp Metabolic Panel (14); Future; Expected date: 04/09/2025  -     Lipid Panel; Future; Expected date: 04/09/2025  -     Urinalysis with Culture Reflex; Future; Expected date: 04/09/2025  6. Pure hypercholesterolemia  7. Spinal stenosis of lumbar region with neurogenic claudication  8. Asymptomatic gallstones  9.  Gastroesophageal reflux disease without esophagitis  10. NAFLD (nonalcoholic fatty liver disease)  11. Age-related cataract of both eyes, unspecified age-related cataract type  12. Glaucomatous atrophy of optic disc, unspecified laterality  13. Nonexudative age-related macular degeneration, unspecified laterality, unspecified stage  14. Preglaucoma, unspecified, bilateral  15. Anemia, unspecified type  -     Expanded, Low Complexity (18359)  16. Acute deep vein thrombosis (DVT) of distal vein of right lower extremity (HCC)  -     Expanded, Low Complexity (52060)  17. Encounter for annual health examination  Other orders  -     Apixaban; Take 1 tablet (5 mg total) by mouth 2 (two) times daily.  Dispense: 60 tablet; Refill: 1  [unfilled]            The patient indicates understanding of these issues and agrees to the plan.  Lab work ordered.  Reinforced healthy diet, lifestyle, and exercise.      No follow-ups on file.     Cynthia Nelson MD, 4/9/2025     Supplementary Documentation:   General Health:  In the past six months, have you lost more than 10 pounds without trying?: 1 - Yes  Has your appetite been poor?: Yes  Type of Diet: Diabetic  How would you describe your daily physical activity?: Light  How would you describe your current health state?: Fair  How do you maintain positive mental well-being?: Visiting Family  On a scale of 0 to 10, with 0 being no pain and 10 being severe pain, what is your pain level?: 8 - (Severe)  In the past six months, have you experienced urine leakage?: 0-No  At any time do you feel concerned for the safety/well-being of yourself and/or your children, in your home or elsewhere?: No  Have you had any immunizations at another office such as Influenza, Hepatitis B, Tetanus, or Pneumococcal?: Yes    Health Maintenance   Topic Date Due    Zoster Vaccines (1 of 2) Never done    Colorectal Cancer Screening  03/02/2024    Diabetes Care Dilated Eye Exam  05/10/2024    Annual Well Visit   01/01/2025    Annual Depression Screening  01/01/2025    Fall Risk Screening (Annual)  01/01/2025    Diabetes Care: Foot Exam (Annual)  01/01/2025    Diabetes Care: Microalb/Creat Ratio (Annual)  01/01/2025    Diabetes Care A1C  05/16/2025    Diabetes Care: GFR  11/16/2025    Mammogram  01/08/2026    Influenza Vaccine  Completed    DEXA Scan  Completed    Pneumococcal Vaccine: 50+ Years  Completed    COVID-19 Vaccine  Completed    Meningococcal B Vaccine  Aged Out            [1]   Patient Active Problem List  Diagnosis    Hypertension associated with diabetes (HCC)    Pure hypercholesterolemia    Type 2 diabetes mellitus with diabetic polyneuropathy, without long-term current use of insulin (HCC)    Morbid obesity due to excess calories (HCC)    Gastroesophageal reflux disease without esophagitis    Asymptomatic gallstones    NAFLD (nonalcoholic fatty liver disease)    Age-related cataract of both eyes    Glaucomatous optic atrophy    Mild nonproliferative diabetic retinopathy associated with type 2 diabetes mellitus (HCC)    Nonexudative age-related macular degeneration    Preglaucoma, unspecified, bilateral    Chronic pain of right knee    Spinal stenosis of lumbar region with neurogenic claudication   [2]   Outpatient Medications Marked as Taking for the 4/9/25 encounter (Office Visit) with Cynthia Nelson MD   Medication Sig    ELIQUIS 5 MG Oral Tab Take by mouth every 12 (twelve) hours.    docusate sodium (COLACE) 100 MG Oral Cap Take by mouth.    Doxycycline Monohydrate 100 MG Oral Cap     HYDROcodone-acetaminophen  MG Oral Tab Take by mouth.    oxyCODONE 5 MG Oral Tab Take 1 tablet (5 mg total) by mouth.    apixaban (ELIQUIS) 5 MG Oral Tab Take 1 tablet (5 mg total) by mouth 2 (two) times daily.    ACETAMINOPHEN-CODEINE 300-30 MG Oral Tab TAKE 1 TABLET BY MOUTH EVERY 8 HOURS AS NEEDED    Accu-Chek Softclix Lancets Does not apply Misc 1 Lancet by Finger stick route daily. Use as directed.    Accu-Chek  FastClix Lancets Does not apply Misc 1 Lancet by Finger stick route daily. Use as directed.    Blood Glucose Monitoring Suppl w/Device Does not apply Kit Check blood sugar once a day.    omeprazole 20 MG Oral Capsule Delayed Release Take 1 capsule (20 mg total) by mouth 2 (two) times daily before meals.    SPIRONOLACTONE 25 MG Oral Tab TAKE 1 TABLET(25 MG) BY MOUTH DAILY    METFORMIN HCL 1000 MG Oral Tab TAKE 1 TABLET(1000 MG) BY MOUTH TWICE DAILY    dicyclomine 10 MG Oral Cap Take 1 capsule (10 mg total) by mouth nightly as needed.    pregabalin 150 MG Oral Cap Take 1 capsule (150 mg total) by mouth 2 (two) times daily.    TOPIRAMATE 25 MG Oral Tab TAKE 1 TABLET(25 MG) BY MOUTH TWICE DAILY    Blood Glucose Monitoring Suppl (ACCU-CHEK GUIDE) w/Device Does not apply Kit 1 kit by Other route daily.    Glucose Blood (ACCU-CHEK GUIDE) In Vitro Strip 1 strip by In Vitro route daily.    topiramate 50 MG Oral Tab Take 1 tablet (50 mg total) by mouth 2 (two) times daily.    OneTouch Delica Lancets 33G Does not apply Misc USE DAILY AS DIRECTED    ONETOUCH ULTRA BLUE In Vitro Strip TEST SUGAR DAILY   [3]   Social History  Tobacco Use   Smoking Status Former    Current packs/day: 0.00    Average packs/day: 0.3 packs/day for 12.0 years (3.6 ttl pk-yrs)    Types: Cigarettes    Start date: 1/1/2003    Quit date: 1/1/2015    Years since quitting: 10.2   Smokeless Tobacco Never

## 2025-04-10 DIAGNOSIS — E11.42 TYPE 2 DIABETES MELLITUS WITH DIABETIC POLYNEUROPATHY, WITHOUT LONG-TERM CURRENT USE OF INSULIN (HCC): ICD-10-CM

## 2025-04-10 RX ORDER — PRAVASTATIN SODIUM 20 MG
20 TABLET ORAL EVERY EVENING
Qty: 90 TABLET | Refills: 0 | Status: SHIPPED | OUTPATIENT
Start: 2025-04-10

## 2025-04-10 RX ORDER — SPIRONOLACTONE 25 MG/1
25 TABLET ORAL DAILY
Qty: 90 TABLET | Refills: 0 | Status: SHIPPED | OUTPATIENT
Start: 2025-04-10

## 2025-04-10 NOTE — TELEPHONE ENCOUNTER
Requesting   Name from pharmacy: PRAVASTATIN 20MG TABLETS          Will file in chart as: PRAVASTATIN 20 MG Oral Tab    Sig: TAKE 1 TABLET(20 MG) BY MOUTH EVERY EVENING    Disp: 90 tablet    Refills: 0 (Pharmacy requested: Not specified)    Start: 4/10/2025    Class: Normal    Non-formulary    Last ordered: 3 months ago (1/10/2025) by Cynthia Nelson MD    Last refill: 1/10/2025    Rx #: [id number]    Cholesterol Medication Protocol Rmepwy62/10/2025 05:51 AM   Protocol Details ALT < 80    ALT resulted within past year    Lipid panel within past 12 months    In person appointment or virtual visit in the past 12 mos or appointment in next 3 mos    Medication is active on med list       Name from pharmacy: SPIRONOLACTONE 25MG TABLETS         Will file in chart as: SPIRONOLACTONE 25 MG Oral Tab    Sig: TAKE 1 TABLET(25 MG) BY MOUTH DAILY    Disp: 90 tablet    Refills: 0 (Pharmacy requested: Not specified)    Start: 4/10/2025    Class: Normal    Non-formulary    Last ordered: 3 months ago (1/10/2025) by Cynthia Nelson MD    Last refill: 1/10/2025    Rx #: [id number]    Hypertension Medications Protocol Prsljg83/10/2025 05:51 AM   Protocol Details CMP or BMP in past 12 months    Last BP reading less than 140/90    In person appointment or virtual visit in the past 12 mos or appointment in next 3 mos    EGFRCR or GFRAA > 50    Medication is active on med list       Name from pharmacy: METFORMIN 1000MG TABLETS         Will file in chart as: METFORMIN HCL 1000 MG Oral Tab    Sig: TAKE 1 TABLET(1000 MG) BY MOUTH TWICE DAILY    Disp: 180 tablet    Refills: 0 (Pharmacy requested: Not specified)    Start: 4/10/2025    Class: Normal    Non-formulary For: Type 2 diabetes mellitus with diabetic polyneuropathy, without long-term current use of insulin (HCC)    Last ordered: 3 months ago (1/10/2025) by Cynthia Nelson MD    Last refill: 1/10/2025    Rx #: [id number]    Diabetes Medication Protocol Npwaar44/10/2025  05:51 AM   Protocol Details Last A1C < 7.5 and within past 6 months    In person appointment or virtual visit in the past 6 mos or appointment in next 3 mos    Microalbumin procedure in past 12 months or taking ACE/ARB    EGFRCR or GFRAA > 50    GFR in the past 12 months    Medication is active on med list        LOV: 4/9/2025  RTC: none noted   Last Relevant Labs: 4/9/2025  Filled: 1/10/2025 #90 with 0 refills    No future appointments.

## 2025-04-15 DIAGNOSIS — R74.01 ELEVATED ALT MEASUREMENT: ICD-10-CM

## 2025-04-15 DIAGNOSIS — Z98.890 H/O KNEE SURGERY: ICD-10-CM

## 2025-04-15 DIAGNOSIS — E11.59 HYPERTENSION ASSOCIATED WITH DIABETES (HCC): ICD-10-CM

## 2025-04-15 DIAGNOSIS — R74.01 ELEVATED AST (SGOT): Primary | ICD-10-CM

## 2025-04-15 DIAGNOSIS — I15.2 HYPERTENSION ASSOCIATED WITH DIABETES (HCC): ICD-10-CM

## 2025-04-15 NOTE — TELEPHONE ENCOUNTER
Left message to call back    Cynthia Nelson MD  4/10/2025  7:56 AM CDT       Anemia is better but not back to normal       rec daily iron 325mg #90    lucia CBC 3 mo  Urine looks like possible UTI    Any s/s?  Liver labs up slightly still     lucia AST/ALT 3 mo  A1c and cholesterol are fine    thyroid in range        Cynthia Nelson MD  4/11/2025 11:31 AM CDT Back to Top      No UTI

## 2025-04-15 NOTE — TELEPHONE ENCOUNTER
Calling with a couple things for TO:    - Pt is asking for RN to call back to discuss labs that were drawn after appt with TO.     -They are requesting outpatient physical therapy for patient. This is for the healing of her knee. Needs help with being able to be more mobile.     -Due to the blood clot after surgery, patient is still having issues with standing too long. When she does, she will have swelling and will need to ice it.     Pt is our of Magnesium and Hydrocodone as well. Asking if TO is okay to refill?    Vi gave the number of [phone] for a good call back if we have any additional questions.

## 2025-04-15 NOTE — TELEPHONE ENCOUNTER
TO: Please see below. Pended all orders as requested by patient/patient care coordinator. Please review and sign if appropriate.     Spoke with patient regarding several issues. Patient states rehab physician prescribed hydrocodone-acetaminophen 10/325 and magnesium \"Mag-G 500mg) requesting refill. Pended. Patient requesting PT for knee rehab. Pended. All other labs pended per Dr. Nelson. Results and recommendations reviewed with patient.     Patient expressed concern with walking stairs with her knee. States her mobility is ok but requests PT to help her heal her knee. Reports icing area after blood clot to keep swelling down.     Time on phone with patient: 22 minutes

## 2025-04-16 RX ORDER — PNV NO.95/FERROUS FUM/FOLIC AC 28MG-0.8MG
325 TABLET ORAL DAILY
Qty: 90 TABLET | Refills: 0 | Status: SHIPPED | OUTPATIENT
Start: 2025-04-16 | End: 2025-07-15

## 2025-04-16 RX ORDER — MAGNESIUM GLUCONATE 27 MG(500)
500 TABLET ORAL DAILY
Qty: 90 TABLET | Refills: 1 | Status: SHIPPED | OUTPATIENT
Start: 2025-04-16

## 2025-04-16 RX ORDER — HYDROCODONE BITARTRATE AND ACETAMINOPHEN 10; 325 MG/1; MG/1
1 TABLET ORAL EVERY 6 HOURS PRN
Refills: 0 | OUTPATIENT
Start: 2025-04-16

## 2025-04-18 ENCOUNTER — TELEPHONE (OUTPATIENT)
Dept: INTERNAL MEDICINE CLINIC | Facility: CLINIC | Age: 75
End: 2025-04-18

## 2025-04-18 NOTE — TELEPHONE ENCOUNTER
S/w patient informed results/recommendations from provider result note below. Patient denies symptoms of UTI.   Informed she can schedule PT appointment, she is concerned about blood clott and starting PT- informed patient that she can proceed to schedule PT appointment as she is being treated for the blood clott with Eliquis, Dr Nelson is ok with patient starting PT.

## 2025-04-18 NOTE — TELEPHONE ENCOUNTER
Tried to call Margarito - Care Coordinator at [facility] and advised to ask patient to call our office.   Also LM for patient to call our office

## 2025-04-29 ENCOUNTER — TELEPHONE (OUTPATIENT)
Dept: PHYSICAL THERAPY | Facility: HOSPITAL | Age: 75
End: 2025-04-29

## 2025-05-02 ENCOUNTER — OFFICE VISIT (OUTPATIENT)
Dept: PHYSICAL THERAPY | Age: 75
End: 2025-05-02
Attending: FAMILY MEDICINE
Payer: MEDICARE

## 2025-05-02 DIAGNOSIS — Z98.890 H/O KNEE SURGERY: Primary | ICD-10-CM

## 2025-05-02 PROCEDURE — 97110 THERAPEUTIC EXERCISES: CPT

## 2025-05-02 PROCEDURE — 97161 PT EVAL LOW COMPLEX 20 MIN: CPT

## 2025-05-02 NOTE — PROGRESS NOTES
LOWER EXTREMITY EVALUATION:     Diagnosis:   H/O knee surgery (Z98.890) Patient:  Felicia Velez (74 year old, female)        Referring Provider: Cynthia Nelson  Today's Date   5/2/2025    Precautions:  None   Date of Evaluation: 05/02/25  Next MD visit: 5/6/2025  Date of Surgery: 3/14/25 R TKA revision, R TKA 2013     PATIENT SUMMARY   Summary of chief complaints: R knee pain, swelling from TKA revision surgery   History of current condition: Pt reporting she has a R TKA in 2013, she had a revision in March. She did rehab for about 4 days, has not done any exercises at home. Pt reporting she has been experiencing spasms in the incision area, but overall has been feeling good. Pt reporting developed a DVT about 4 days after the surgery. She is currently still on blood thinners. Has been using ice for the swelling and has been helpful. She has been trying to keep active with her daily activites and increasing her physical activity.   Pain level: current 0 /10, at best 0 /10, at worst 2 /10  Description of symptoms: spams   Occupation: not working   Leisure activities/Hobbies: water aquatics   Prior level of function: good  Current limitations: prolonged walking >30 minutes, stairs ascending and descending with no assistive device, walking and standing with no assisitve device, household activities- cleaning, cooking, prolonged standing >15 minutes  Pt goals: return to prior level of function  Past medical history was reviewed with [employer].  Significant findings include: Hx R TKA 2013  Imaging/Tests: 1/30/24 XRay: Impression  CONCLUSION:    The frontal view on this exam is obtained in oblique projection.  Total knee arthroplasty, right side, stable alignment and position.  No sign of hardware complication, disruption, fracture, demineralization.   Felicia  has a past medical history of Abdominal hernia, Anxiety, Arthritis, Back pain, Blurred vision, Change in hair, Colitis, Depression, DIABETES, Diabetes mellitus  (HCC), Esophageal reflux, Essential hypertension, benign (2013), Food intolerance, Hearing impaired person, bilateral, Heartburn, High blood pressure, High cholesterol, Indigestion, Lipid screening (2012), Low blood potassium, Measles without mention of complication, Mumps without mention of complication, Night sweats, Osteoarthrosis, unspecified whether generalized or localized, unspecified site, Other and unspecified hyperlipidemia, OTHER DISEASES, Pain in joints, Pain with bowel movements, Pure hypercholesterolemia (2013), Type II or unspecified type diabetes mellitus without mention of complication, not stated as uncontrolled, Unspecified essential hypertension, Varicella without mention of complication, Visual impairment, and Wears glasses.  She  has a past surgical history that includes ; hernia surgery; colonoscopy (2012); ; hemorrhoidectomy; hernia surgery; laminectomy,lumbar; knee replacement surgery (2010, 2010); other surgical history; colonoscopy (N/A, 2022); back surgery; and hemorrhoidectomy,int/ext,simple.    ASSESSMENT  Felicia presents to physical therapy evaluation with primary c/o R. The results of the objective tests and measures show decreased knee ROM, decreased LE strength, increased swelling, decreased endurance. Functional deficits include but are not limited to prolonged walking >30 minutes, stairs ascending and descending with no assistive device, walking and standing with no assisitve device, household activities- cleaning, cooking, prolonged standing >15 minutes. Signs and symptoms are consistent with diagnosis of H/O knee surgery (Z98.890). Pt and PT discussed evaluation findings, pathology, POC and HEP.  Pt voiced understanding and performs HEP correctly without reported pain. Skilled Physical Therapy is medically necessary to address the above impairments and reach functional goals.    OBJECTIVE:    Musculoskeletal  Observation: mod  swelling to R knee  Palpation: tenderness R quad   Accessory Motion: NT     Edema: Edema:  Circumferential measures:  Superior to patella: R 53  cm, L 49 cm  Joint line: R 45 cm, L 43 cm  Inferior to patella: R 46 cm, L 45 cm     Special Tests:NT     ROM and Strength: (* denotes performed with pain)  Hip   ROM MMT (-/5)    R L R L     Flex (L2)     4- 4-     Ext              Abd             ER             IR            ,   Knee   ROM MMT (-/5)    R L R L     Flex 80 95 3+ 3+     Ext (L3) 0 0 4- 4-     ,   Ankle/Foot   ROM MMT (-/5)    R L R L     PF     4+ 4+     DF (L4)     4+ 4+     Inversion             Eversion             Grt Toe Ext (L5)                 Neurological:  Sensation: normal     Balance and Functional Mobility:  Gait: pt ambulates on level ground with assistive device; trendelenburg/waddle; stooped posture/forward lean; decreased stance phase; decreased step length (walker)     Functional Mobility:  5x sit to stand test: 14 sec (w/ UE use)        Today's Treatment and Response:   Pt education was provided on exam findings, treatment diagnosis, treatment plan, expectations, and prognosis.  Today's Treatment       5/2/2025   LE Treatment   Therapeutic Exercise Supine quad sets x10, 5 iso hold   Supine heel slides with strap x10, 5 iso hold  Standing calf stretch 30 sec x4    Reviewed HEP    Pt education  -DVT s/s vs normal swelling post surgery   -ice use for swelling   -not return to aquatics until med cleared      Therapeutic Exercise Minutes 25   Evaluation Minutes 20   Total Time Of Timed Procedures 25   Total Time Of Service-Based Procedures 20   Total Treatment Time 45   HEP Access Code: [email or web address]  URL: [email or web address]  Date: 05/02/2025  Prepared by: Iglesia Deng    Exercises  - Supine Quad Set  - 1 x daily - 7 x weekly - 3 sets - 10 reps  - Supine Heel Slide with Strap  - 1 x daily - 7 x weekly - 3 sets - 10 reps  - Seated Heel Slide  - 1 x daily - 7 x weekly - 3 sets -  10 reps  - Mini Squat with Counter Support  - 1 x daily - 7 x weekly - 3 sets - 10 reps  - Heel Raises with Counter Support  - 1 x daily - 7 x weekly - 3 sets - 10 reps  - Gastroc Stretch on Wall  - 1 x daily - 7 x weekly - 3 sets - 10 reps        Patient was instructed in and issued a HEP for: Access Code: [email or web address]  URL: [email or web address]  Date: 05/02/2025  Prepared by: Iglesia Deng    Exercises  - Supine Quad Set  - 1 x daily - 7 x weekly - 3 sets - 10 reps  - Supine Heel Slide with Strap  - 1 x daily - 7 x weekly - 3 sets - 10 reps  - Seated Heel Slide  - 1 x daily - 7 x weekly - 3 sets - 10 reps  - Mini Squat with Counter Support  - 1 x daily - 7 x weekly - 3 sets - 10 reps  - Heel Raises with Counter Support  - 1 x daily - 7 x weekly - 3 sets - 10 reps  - Gastroc Stretch on Wall  - 1 x daily - 7 x weekly - 3 sets - 10 reps    Charges:  PT EVAL: Low Complexity, 1 low complex eval, 2 therex  In agreement with evaluation findings and clinical rationale, this evaluation involved LOW COMPLEXITY decision making due to no personal factors/comorbidities, 1-2 body structures involved/activity limitations, and stable symptoms as documented in the evaluation.                                                                                                                PLAN OF CARE:    Goals: (to be met in 12 visits)    Not Met Progress Toward Partially Met Met   Pt will demonstrate tolerating standing >30 minutes with minimal to no pain to be able to return to prior level of function and be able to perform household activities like cooking.  [] [] [] []   Pt will reporting walking >30 minutes with straight cane with minimal to no pain to be able to ambulate safely within the community.  [] [] [] []   Pt will improve quad strength to 5/5 to ascend 1 flight of stairs reciprocally without UE assist. [] [] [] []   Pt will increase hip and knee strength to grossly 4+/5 to be able to get up and down from  the floor safely. [] [] [] []   Pt will report participating in aquatics for >1 hour with minimal to no pain to be able to return to prior level of physical activity.  [] [] [] []   Pt will demonstrate ambulating with no assistive device use to be able to return to prior level of independence.  [] [] [] []   Pt will demonstrate 5x STS <12 seconds with no UE support to be able to safely perform sit to stand transfers at home, within the community and report decrease risk for falls.  [] [] [] []   Patient will demonstrate a score of 37 or higher in the LEFS outcome measure to report significant difference and low concern for condition. (Eval 28)   [] [] [] []   Pt will be independent and compliant with comprehensive HEP to maintain progress achieved in PT. [] [] [] []          Frequency / Duration: Patient will be seen 2x/week or a total of 12  visits over a 90 day period. Treatment will include: Gait training; Manual Therapy; Neuromuscular Re-education; Therapeutic Exercise; Therapeutic Activities; Home Exercise Program instruction; Electrical stimulation (unattended); Electrical stimulation (attended); Ultrasound; Patient/Family Education    Education or treatment limitation: None   Rehab Potential: good     LEFS Score  LEFS Score: (Patient-Rptd) 28.75 % (5/2/2025  7:53 AM)      Patient/Family/Caregiver was advised of these findings, precautions, and treatment options and has agreed to actively participate in planning and for this course of care.    Thank you for your referral. Please co-sign or sign and return this letter via fax as soon as possible to [phone]. If you have any questions, please contact me at Dept: [phone]    Sincerely,  Electronically signed by therapist: Iglesia Deng PT  Physician's certification required: Yes  I certify the need for these services furnished under this plan of treatment and while under my care.    X___________________________________________________  Date____________________    Certification From: 5/2/2025  To:7/31/2025

## 2025-05-03 ENCOUNTER — MOBILE ENCOUNTER (OUTPATIENT)
Dept: INTERNAL MEDICINE CLINIC | Facility: CLINIC | Age: 75
End: 2025-05-03

## 2025-05-09 ENCOUNTER — OFFICE VISIT (OUTPATIENT)
Dept: PHYSICAL THERAPY | Age: 75
End: 2025-05-09
Attending: FAMILY MEDICINE
Payer: MEDICARE

## 2025-05-09 PROCEDURE — 97110 THERAPEUTIC EXERCISES: CPT

## 2025-05-09 NOTE — PROGRESS NOTES
Patient: Felicia Velez (74 year old, female) Referring Provider:  Insurance:   Diagnosis: H/O knee surgery (Z98.890) Romariodavidjudit Omar [facility]   Date of Surgery: 3/14/25 R TKA revision, R TKA 2013 Next MD visit:  N/A   Precautions:  None 5/6/2025 Referral Information:    Date of Evaluation: Req: 8, Auth: 8, Exp: 7/2/2025 05/02/25 POC Auth Visits:          Today's Date   5/9/2025    Subjective  Pt reporting she saw doctor and said everything is looking good. She will be following up in 2 months.       Pain: pain not reported     Objective             Assessment  Pt demonstrating increased R knee flexion ROM as shown above. Continued to work on knee strengthening and ROM. Will continue to progress per pt tolerance. HEP updated and reviewed.    Goals (to be met in 12 visits)      Not Met Progress Toward Partially Met Met   Pt will demonstrate tolerating standing >30 minutes with minimal to no pain to be able to return to prior level of function and be able to perform household activities like cooking.  [] [] [] []   Pt will reporting walking >30 minutes with straight cane with minimal to no pain to be able to ambulate safely within the community.  [] [] [] []   Pt will improve quad strength to 5/5 to ascend 1 flight of stairs reciprocally without UE assist. [] [] [] []   Pt will increase hip and knee strength to grossly 4+/5 to be able to get up and down from the floor safely. [] [] [] []   Pt will report participating in aquatics for >1 hour with minimal to no pain to be able to return to prior level of physical activity.  [] [] [] []   Pt will demonstrate ambulating with no assistive device use to be able to return to prior level of independence.  [] [] [] []   Pt will demonstrate 5x STS <12 seconds with no UE support to be able to safely perform sit to stand transfers at home, within the community and report decrease risk for falls.  [] [] [] []   Patient will demonstrate a score of 37 or higher in the LEFS  outcome measure to report significant difference and low concern for condition. (Eval 28)   [] [] [] []   Pt will be independent and compliant with comprehensive HEP to maintain progress achieved in PT. [] [] [] []              Plan  cont with knee strengthening and ROM    Treatment Last 4 Visits  Treatment Day: 2       5/2/2025 5/9/2025   LE Treatment   Therapeutic Exercise Supine quad sets x10, 5 iso hold   Supine heel slides with strap x10, 5 iso hold  Standing calf stretch 30 sec x4    Reviewed HEP    Pt education  -DVT s/s vs normal swelling post surgery   -ice use for swelling   -not return to aquatics until med cleared    Nustep level 4, 9 minutes  Clinic walking small base quad cane 30ft x4 supervision     Supine quad sets x10, 5 iso hold R  Supine SLR 2x10 R    Seated LAQ 2x10, 5 iso hold R  Seated hamstring curl 2x10 red R    Sit to stands 2x10    Standing hip   -flexion x10 each  -abd x10 each  -ext x10 each     Therapeutic Exercise Minutes 25 45   Evaluation Minutes 20    Total Time Of Timed Procedures 25 45   Total Time Of Service-Based Procedures 20 0   Total Treatment Time 45 45   HEP Access Code: [email or web address]  URL: [email or web address]  Date: 05/02/2025  Prepared by: Iglesia Campbelltiz    Exercises  - Supine Quad Set  - 1 x daily - 7 x weekly - 3 sets - 10 reps  - Supine Heel Slide with Strap  - 1 x daily - 7 x weekly - 3 sets - 10 reps  - Seated Heel Slide  - 1 x daily - 7 x weekly - 3 sets - 10 reps  - Mini Squat with Counter Support  - 1 x daily - 7 x weekly - 3 sets - 10 reps  - Heel Raises with Counter Support  - 1 x daily - 7 x weekly - 3 sets - 10 reps  - Gastroc Stretch on Wall  - 1 x daily - 7 x weekly - 3 sets - 10 reps         HEP  Access Code: [email or web address]  URL: [email or web address]  Date: 05/02/2025  Prepared by: Ysandra Deng    Exercises  - Supine Quad Set  - 1 x daily - 7 x weekly - 3 sets - 10 reps  - Supine Heel Slide with Strap  - 1 x daily - 7 x weekly -  3 sets - 10 reps  - Seated Heel Slide  - 1 x daily - 7 x weekly - 3 sets - 10 reps  - Mini Squat with Counter Support  - 1 x daily - 7 x weekly - 3 sets - 10 reps  - Heel Raises with Counter Support  - 1 x daily - 7 x weekly - 3 sets - 10 reps  - Gastroc Stretch on Wall  - 1 x daily - 7 x weekly - 3 sets - 10 reps    Charges     3 therex

## 2025-05-13 ENCOUNTER — OFFICE VISIT (OUTPATIENT)
Dept: PHYSICAL THERAPY | Age: 75
End: 2025-05-13
Attending: FAMILY MEDICINE
Payer: MEDICARE

## 2025-05-13 PROCEDURE — 97140 MANUAL THERAPY 1/> REGIONS: CPT

## 2025-05-13 PROCEDURE — 97110 THERAPEUTIC EXERCISES: CPT

## 2025-05-13 NOTE — PROGRESS NOTES
Patient: Felicia Velez (74 year old, female) Referring Provider:  Insurance:   Diagnosis: H/O knee surgery (Z98.890) Cynthia [facility]   Date of Surgery: 3/14/25 R TKA revision, R TKA 2013 Next MD visit:  N/A   Precautions:  None 5/6/2025 Referral Information:    Date of Evaluation: Req: 8, Auth: 8, Exp: 7/2/2025 05/02/25 POC Auth Visits:          Today's Date   5/13/2025    Subjective  Pt states she has known DVT in RLE since MD appt 4-1-25. She is still on eliquis. Today her leg feels 'pretty good'.       Pain: 5/10     Objective       SEE TX LOG    8 wks postop 5-9-25        Knee ROM    5-2-25 5-13-25    R L R     Flex 80 95 92P     Ext (L3) 0 0        Assessment   Pt tolerated manual stretching into knee flexion, resulting in good improvement in mobility today. She fatigued easily c standing ex's.    Goals (to be met in 12 visits)        Not Met Progress Toward Partially Met Met   Pt will demonstrate tolerating standing >30 minutes with minimal to no pain to be able to return to prior level of function and be able to perform household activities like cooking.  []  []  []  []    Pt will reporting walking >30 minutes with straight cane with minimal to no pain to be able to ambulate safely within the community.  []  []  []  []    Pt will improve quad strength to 5/5 to ascend 1 flight of stairs reciprocally without UE assist. []  []  []  []    Pt will increase hip and knee strength to grossly 4+/5 to be able to get up and down from the floor safely. []  []  []  []    Pt will report participating in aquatics for >1 hour with minimal to no pain to be able to return to prior level of physical activity.  []  []  []  []    Pt will demonstrate ambulating with no assistive device use to be able to return to prior level of independence.  []  []  []  []    Pt will demonstrate 5x STS <12 seconds with no UE support to be able to safely perform sit to stand transfers at home, within the community and report  decrease risk for falls.  []  []  []  []    Patient will demonstrate a score of 37 or higher in the LEFS outcome measure to report significant difference and low concern for condition. (Eval 28)    []  []  []  []    Pt will be independent and compliant with comprehensive HEP to maintain progress achieved in PT. []  []  []  []           Plan   Continue to progress knee mobility and overall functional strength LE.     Treatment Last 4 Visits  Treatment Day: 3       5/2/2025 5/9/2025 5/13/2025   LE Treatment   Therapeutic Exercise Supine quad sets x10, 5 iso hold   Supine heel slides with strap x10, 5 iso hold  Standing calf stretch 30 sec x4    Reviewed HEP    Pt education  -DVT s/s vs normal swelling post surgery   -ice use for swelling   -not return to aquatics until med cleared    Nustep level 4, 9 minutes  Clinic walking small base quad cane 30ft x4 supervision     Supine quad sets x10, 5 iso hold R  Supine SLR 2x10 R    Seated LAQ 2x10, 5 iso hold R  Seated hamstring curl 2x10 red R    Sit to stands 2x10    Standing hip   -flexion x10 each  -abd x10 each  -ext x10 each   Nustep level 4, 10 minutes  R SAQ 5\"x2min  Sit to stands 2x10  Seated hamstring curl 2x10 red R  Clinic walking c RW 30 ftx1, c SBQC 30ft x2 supervision    Seated LAQ 2x10, 5 iso hold R  Standing march x20  Standing hip abd x20 each   Manual Therapy   Scar mob in sitting c knee flexed  AP, PA proximal tibia in sitting gr 2  Knee flexion to endrange in sitting 10\"x10   Therapeutic Exercise Minutes 25 45 30   Manual Therapy Minutes   15   Evaluation Minutes 20     Total Time Of Timed Procedures 25 45 45   Total Time Of Service-Based Procedures 20 0 0   Total Treatment Time 45 45 45   HEP Access Code: [email or web address]  URL: [email or web address]  Date: 05/02/2025  Prepared by: Iglesia Deng    Exercises  - Supine Quad Set  - 1 x daily - 7 x weekly - 3 sets - 10 reps  - Supine Heel Slide with Strap  - 1 x daily - 7 x weekly - 3 sets - 10  reps  - Seated Heel Slide  - 1 x daily - 7 x weekly - 3 sets - 10 reps  - Mini Squat with Counter Support  - 1 x daily - 7 x weekly - 3 sets - 10 reps  - Heel Raises with Counter Support  - 1 x daily - 7 x weekly - 3 sets - 10 reps  - Gastroc Stretch on Wall  - 1 x daily - 7 x weekly - 3 sets - 10 reps          HEP  Access Code: [email or web address]  URL: [email or web address]  Date: 05/02/2025  Prepared by: Iglesia Deng    Exercises  - Supine Quad Set  - 1 x daily - 7 x weekly - 3 sets - 10 reps  - Supine Heel Slide with Strap  - 1 x daily - 7 x weekly - 3 sets - 10 reps  - Seated Heel Slide  - 1 x daily - 7 x weekly - 3 sets - 10 reps  - Mini Squat with Counter Support  - 1 x daily - 7 x weekly - 3 sets - 10 reps  - Heel Raises with Counter Support  - 1 x daily - 7 x weekly - 3 sets - 10 reps  - Gastroc Stretch on Wall  - 1 x daily - 7 x weekly - 3 sets - 10 reps    Charges     1man,2therex

## 2025-05-16 ENCOUNTER — OFFICE VISIT (OUTPATIENT)
Dept: PHYSICAL THERAPY | Age: 75
End: 2025-05-16
Attending: FAMILY MEDICINE
Payer: MEDICARE

## 2025-05-16 DIAGNOSIS — Z98.890 H/O KNEE SURGERY: Primary | ICD-10-CM

## 2025-05-16 PROCEDURE — 97140 MANUAL THERAPY 1/> REGIONS: CPT

## 2025-05-16 PROCEDURE — 97110 THERAPEUTIC EXERCISES: CPT

## 2025-05-16 NOTE — PROGRESS NOTES
Patient: Felicia Velez (74 year old, female) Referring Provider:  Insurance:   Diagnosis: H/O knee surgery (Z98.890) Cynthia Omar  [facility]   Date of Surgery: 3/14/25 R TKA revision, R TKA 2013 Next MD visit:  N/A   Precautions:  None 5/6/2025 Referral Information:    Date of Evaluation: Req: 8, Auth: 8, Exp: 7/2/2025 05/02/25 POC Auth Visits:          Today's Date   5/16/2025    Subjective  Pt states no pain this morning, just feeling kind of stiff. Took some Tylenol. Pt states she didn't have time to eat anything yet today. She states her blood sugar has dropped somewhat since the surgery.       Pain: 0/10     Objective          SEE TX LOG    8 wks postop 5-9-25     Assessment   Pt reports painfree today. She tolerated more ambulation c cane today, but reported feeling some weakness in her legs that she attributed to low blood sugar; notes she had not had anything to eat today. Denies dizziness. Pt had small piece of candy and reported feeling better. Overall showed good progress in exercise tolerance today, c incr walking and addition of minisquats    Goals (to be met in 12 visits)        Not Met Progress Toward Partially Met Met   Pt will demonstrate tolerating standing >30 minutes with minimal to no pain to be able to return to prior level of function and be able to perform household activities like cooking.  []  []  []  []    Pt will reporting walking >30 minutes with straight cane with minimal to no pain to be able to ambulate safely within the community.  []  []  []  []    Pt will improve quad strength to 5/5 to ascend 1 flight of stairs reciprocally without UE assist. []  []  []  []    Pt will increase hip and knee strength to grossly 4+/5 to be able to get up and down from the floor safely. []  []  []  []    Pt will report participating in aquatics for >1 hour with minimal to no pain to be able to return to prior level of physical activity.  []  []  []  []    Pt will demonstrate ambulating with no  assistive device use to be able to return to prior level of independence.  []  []  []  []    Pt will demonstrate 5x STS <12 seconds with no UE support to be able to safely perform sit to stand transfers at home, within the community and report decrease risk for falls.  []  []  []  []    Patient will demonstrate a score of 37 or higher in the LEFS outcome measure to report significant difference and low concern for condition. (Eval 28)    []  []  []  []    Pt will be independent and compliant with comprehensive HEP to maintain progress achieved in PT. []  []  []  []       Plan   Continue to progress RLE strength and gait as tolerated    Treatment Last 4 Visits  Treatment Day: 4       5/2/2025 5/9/2025 5/13/2025 5/16/2025   LE Treatment   Therapeutic Exercise Supine quad sets x10, 5 iso hold   Supine heel slides with strap x10, 5 iso hold  Standing calf stretch 30 sec x4    Reviewed HEP    Pt education  -DVT s/s vs normal swelling post surgery   -ice use for swelling   -not return to aquatics until med cleared    Nustep level 4, 9 minutes  Clinic walking small base quad cane 30ft x4 supervision     Supine quad sets x10, 5 iso hold R  Supine SLR 2x10 R    Seated LAQ 2x10, 5 iso hold R  Seated hamstring curl 2x10 red R    Sit to stands 2x10    Standing hip   -flexion x10 each  -abd x10 each  -ext x10 each   Nustep level 4, 10 minutes  R SAQ 5\"x2min  Sit to stands 2x10  Seated hamstring curl 2x10 red R  Clinic walking c RW 30 ftx1, c SBQC 30ft x2 supervision    Seated LAQ 2x10, 5 iso hold R  Standing march x20  Standing hip abd x20 each Nustep level 4, 6 minutes  R SAQ 1# 5\"x2min  Bridges x10  Sit to stands 2x10  Seated hamstring curl 2x10 red R  Clinic walking c SBQC 30ft x5 supervision    Standing hip abd x20 each  Standing hip ext x20 each  Standing march 1min  Minisquats 2x10   Manual Therapy   Scar mob in sitting c knee flexed  AP, PA proximal tibia in sitting gr 2  Knee flexion to endrange in sitting 10\"x10 Scar  mob in sitting c knee flexed  AP, PA proximal tibia in sitting gr 2  Knee flexion to endrange in sitting 10\"x10     Therapeutic Exercise Minutes 25 45 30 30   Manual Therapy Minutes   15 8   Evaluation Minutes 20      Total Time Of Timed Procedures 25 45 45 38   Total Time Of Service-Based Procedures 20 0 0 0   Total Treatment Time 45 45 45 38   HEP Access Code: [email or web address]  URL: [email or web address]  Date: 05/02/2025  Prepared by: Iglesia Campbelltiz    Exercises  - Supine Quad Set  - 1 x daily - 7 x weekly - 3 sets - 10 reps  - Supine Heel Slide with Strap  - 1 x daily - 7 x weekly - 3 sets - 10 reps  - Seated Heel Slide  - 1 x daily - 7 x weekly - 3 sets - 10 reps  - Mini Squat with Counter Support  - 1 x daily - 7 x weekly - 3 sets - 10 reps  - Heel Raises with Counter Support  - 1 x daily - 7 x weekly - 3 sets - 10 reps  - Gastroc Stretch on Wall  - 1 x daily - 7 x weekly - 3 sets - 10 reps           HEP  Access Code: [email or web address]  URL: [email or web address]  Date: 05/02/2025  Prepared by: Ranjanara Deng    Exercises  - Supine Quad Set  - 1 x daily - 7 x weekly - 3 sets - 10 reps  - Supine Heel Slide with Strap  - 1 x daily - 7 x weekly - 3 sets - 10 reps  - Seated Heel Slide  - 1 x daily - 7 x weekly - 3 sets - 10 reps  - Mini Squat with Counter Support  - 1 x daily - 7 x weekly - 3 sets - 10 reps  - Heel Raises with Counter Support  - 1 x daily - 7 x weekly - 3 sets - 10 reps  - Gastroc Stretch on Wall  - 1 x daily - 7 x weekly - 3 sets - 10 reps    Charges     1man,2therex

## 2025-05-19 ENCOUNTER — OFFICE VISIT (OUTPATIENT)
Dept: PHYSICAL THERAPY | Age: 75
End: 2025-05-19
Attending: FAMILY MEDICINE
Payer: MEDICARE

## 2025-05-19 ENCOUNTER — TELEPHONE (OUTPATIENT)
Dept: INTERNAL MEDICINE CLINIC | Facility: CLINIC | Age: 75
End: 2025-05-19

## 2025-05-19 PROCEDURE — 97140 MANUAL THERAPY 1/> REGIONS: CPT

## 2025-05-19 PROCEDURE — 97110 THERAPEUTIC EXERCISES: CPT

## 2025-05-19 NOTE — TELEPHONE ENCOUNTER
TO: Please see below. Ok to reorder 75mg topiramate?     From LOV 4/9/2025:  Topiramate  50 mg Oral 2 times daily    25 mg Oral 2 times daily

## 2025-05-19 NOTE — PROGRESS NOTES
Patient: Felicia Velez (74 year old, female) Referring Provider:  Insurance:   Diagnosis: H/O knee surgery (Z98.890) Cynthia Omar  [facility]   Date of Surgery: 3/14/25 R TKA revision, R TKA 2013 Next MD visit:  N/A   Precautions:  None 5/6/2025 Referral Information:    Date of Evaluation: Req: 8, Auth: 8, Exp: 7/2/2025 05/02/25 POC Auth Visits:  8       Today's Date   5/19/2025    Subjective  Pt reporting the knee swelling has been going down.       Pain: 0/10     Objective            Assessment  Pt demonstrating increased R knee flexion ROM post manual therapy as shown above. Continued to work on knee strengthening and ROM. Will continue to progress per pt tolerance.    Goals (to be met in 12 visits)      Not Met Progress Toward Partially Met Met   Pt will demonstrate tolerating standing >30 minutes with minimal to no pain to be able to return to prior level of function and be able to perform household activities like cooking.  [] [] [] []   Pt will reporting walking >30 minutes with straight cane with minimal to no pain to be able to ambulate safely within the community.  [] [] [] []   Pt will improve quad strength to 5/5 to ascend 1 flight of stairs reciprocally without UE assist. [] [] [] []   Pt will increase hip and knee strength to grossly 4+/5 to be able to get up and down from the floor safely. [] [] [] []   Pt will report participating in aquatics for >1 hour with minimal to no pain to be able to return to prior level of physical activity.  [] [] [] []   Pt will demonstrate ambulating with no assistive device use to be able to return to prior level of independence.  [] [] [] []   Pt will demonstrate 5x STS <12 seconds with no UE support to be able to safely perform sit to stand transfers at home, within the community and report decrease risk for falls.  [] [] [] []   Patient will demonstrate a score of 37 or higher in the LEFS outcome measure to report significant difference and low concern for  condition. (Eval 28)   [] [] [] []   Pt will be independent and compliant with comprehensive HEP to maintain progress achieved in PT. [] [] [] []                  Plan  cont with knee strengthening and ROM. Add resistance to standing hip abd and ext, step ups forward and lat    Treatment Last 4 Visits  Treatment Day: 5 5/9/2025 5/13/2025 5/16/2025 5/19/2025   LE Treatment   Therapeutic Exercise Nustep level 4, 9 minutes  Clinic walking small base quad cane 30ft x4 supervision     Supine quad sets x10, 5 iso hold R  Supine SLR 2x10 R    Seated LAQ 2x10, 5 iso hold R  Seated hamstring curl 2x10 red R    Sit to stands 2x10    Standing hip   -flexion x10 each  -abd x10 each  -ext x10 each   Nustep level 4, 10 minutes  R SAQ 5\"x2min  Sit to stands 2x10  Seated hamstring curl 2x10 red R  Clinic walking c RW 30 ftx1, c SBQC 30ft x2 supervision    Seated LAQ 2x10, 5 iso hold R  Standing march x20  Standing hip abd x20 each Nustep level 4, 6 minutes  R SAQ 1# 5\"x2min  Bridges x10  Sit to stands 2x10  Seated hamstring curl 2x10 red R  Clinic walking c SBQC 30ft x5 supervision    Standing hip abd x20 each  Standing hip ext x20 each  Standing march 1min  Minisquats 2x10 Nustep level 6, 10 minutes     R SAQ 1# 5\" 2x10  Bridges 2x10   Sit to stands 2x10   Seated hamstring curl 2x10 red R     Clinic walking c SBQC 30ft x5 supervision      Standing hip abd x20 each   Standing hip ext x20 each     8in step forward knee flexion stretch 2x10    In parallel bars  -4in forward step up B UE support 2x10 R  -4in lat step up B UE support 2x10 R   Manual Therapy  Scar mob in sitting c knee flexed  AP, PA proximal tibia in sitting gr 2  Knee flexion to endrange in sitting 10\"x10 Scar mob in sitting c knee flexed  AP, PA proximal tibia in sitting gr 2  Knee flexion to endrange in sitting 10\"x10   AP, PA proximal tibia in supine gr 2-3  Knee flexion to endrange in sitting 10\"x10      Therapeutic Exercise Minutes 45 30 30 45   Manual  Therapy Minutes  15 8 10   Total Time Of Timed Procedures 45 45 38 55   Total Time Of Service-Based Procedures 0 0 0 0   Total Treatment Time 45 45 38 55        HEP  Access Code: [email or web address]  URL: [email or web address]  Date: 05/02/2025  Prepared by: Iglesia Deng    Exercises  - Supine Quad Set  - 1 x daily - 7 x weekly - 3 sets - 10 reps  - Supine Heel Slide with Strap  - 1 x daily - 7 x weekly - 3 sets - 10 reps  - Seated Heel Slide  - 1 x daily - 7 x weekly - 3 sets - 10 reps  - Mini Squat with Counter Support  - 1 x daily - 7 x weekly - 3 sets - 10 reps  - Heel Raises with Counter Support  - 1 x daily - 7 x weekly - 3 sets - 10 reps  - Gastroc Stretch on Wall  - 1 x daily - 7 x weekly - 3 sets - 10 reps    Charges     3 therex, 1 manual

## 2025-05-19 NOTE — TELEPHONE ENCOUNTER
Patient called requesting a refill for Topiramate 75mg   Informed pt we only have Topiramate 25mg twice daily on file   Patient stated she has been taking 75mg   Asked pt who changed the dose to 75 and pt stated she didn't know but she wants a refill of Topiramate 50mg and 25mg to total for 75mg     Please advise on this request

## 2025-05-19 NOTE — TELEPHONE ENCOUNTER
Who has been prescibing theses?   I did it last year for 90d and not since then    What does she use it for ?    Did a neuro give this to her?

## 2025-05-22 ENCOUNTER — OFFICE VISIT (OUTPATIENT)
Dept: PHYSICAL THERAPY | Age: 75
End: 2025-05-22
Attending: FAMILY MEDICINE
Payer: MEDICARE

## 2025-05-22 PROCEDURE — 97110 THERAPEUTIC EXERCISES: CPT

## 2025-05-22 PROCEDURE — 97140 MANUAL THERAPY 1/> REGIONS: CPT

## 2025-05-22 NOTE — PROGRESS NOTES
Patient: Felicia Velez (74 year old, female) Referring Provider:  Insurance:   Diagnosis: H/O knee surgery (Z98.890) Cynthia [facility]   Date of Surgery: 3/14/25 R TKA revision, R TKA 2013 Next MD visit:  N/A   Precautions:  None 5/6/2025 Referral Information:    Date of Evaluation: Req: 8, Auth: 8, Exp: 7/2/2025 05/02/25 POC Auth Visits:  8       Today's Date   5/22/2025    Subjective  Pt states she is having pain this morning, leg is swollen.       Pain: 7/10     Objective       Will be 10 wks postop 5-23-25              Knee ROM    5-2-25 5-13-25 5-22-25    R L R R     Flex 80 95 92P 95P     Ext (L3) 0 0         SEE TX LOG     Assessment  Pt demo slow progress c knee flexion but tolerates endrange stretching s c/o. Supine R SLR pt reports is quite difficult. 4\" stepups pt demo good form inside parallel bars. Attempt 6\" next session.    Goals (to be met in 12 visits)        Not Met Progress Toward Partially Met Met   Pt will demonstrate tolerating standing >30 minutes with minimal to no pain to be able to return to prior level of function and be able to perform household activities like cooking.  []  []  []  []    Pt will reporting walking >30 minutes with straight cane with minimal to no pain to be able to ambulate safely within the community.  []  []  []  []    Pt will improve quad strength to 5/5 to ascend 1 flight of stairs reciprocally without UE assist. []  []  []  []    Pt will increase hip and knee strength to grossly 4+/5 to be able to get up and down from the floor safely. []  []  []  []    Pt will report participating in aquatics for >1 hour with minimal to no pain to be able to return to prior level of physical activity.  []  []  []  []    Pt will demonstrate ambulating with no assistive device use to be able to return to prior level of independence.  []  []  []  []    Pt will demonstrate 5x STS <12 seconds with no UE support to be able to safely perform sit to stand transfers at home,  within the community and report decrease risk for falls.  []  []  []  []    Patient will demonstrate a score of 37 or higher in the LEFS outcome measure to report significant difference and low concern for condition. (Eval 28)    []  []  []  []    Pt will be independent and compliant with comprehensive HEP to maintain progress achieved in PT. []  []  []  []           Plan  cont to progress functional strength as rafael and initiate balance activities    Treatment Last 4 Visits  Treatment Day: 6 5/13/2025 5/16/2025 5/19/2025 5/22/2025   LE Treatment   Therapeutic Exercise Nustep level 4, 10 minutes  R SAQ 5\"x2min  Sit to stands 2x10  Seated hamstring curl 2x10 red R  Clinic walking c RW 30 ftx1, c SBQC 30ft x2 supervision    Seated LAQ 2x10, 5 iso hold R  Standing march x20  Standing hip abd x20 each Nustep level 4, 6 minutes  R SAQ 1# 5\"x2min  Bridges x10  Sit to stands 2x10  Seated hamstring curl 2x10 red R  Clinic walking c SBQC 30ft x5 supervision    Standing hip abd x20 each  Standing hip ext x20 each  Standing march 1min  Minisquats 2x10 Nustep level 6, 10 minutes     R SAQ 1# 5\" 2x10  Bridges 2x10   Sit to stands 2x10   Seated hamstring curl 2x10 red R     Clinic walking c SBQC 30ft x5 supervision      Standing hip abd x20 each   Standing hip ext x20 each     8in step forward knee flexion stretch 2x10    In parallel bars  -4in forward step up B UE support 2x10 R  -4in lat step up B UE support 2x10 R Nustep level 6, 7 minutes   Hooklying c knees flexed to max, +ankle DF x20  Bridges 2x10   R SLR 1x10  R LAQ 1# 5\" 2x10  Seated hamstring curl 2x10 red R   Sit to stands 2x10   Standing hip abd x20 each  Standing hip ext x20 each   8in step forward knee flexion stretch 10\"x5  In parallel bars  -4in forward step up B UE support 2x10 R  -4in lat step up B UE support 2x10 R  Clinic walking c SBQC 2min   Manual Therapy Scar mob in sitting c knee flexed  AP, PA proximal tibia in sitting gr 2  Knee flexion to endrange  in sitting 10\"x10 Scar mob in sitting c knee flexed  AP, PA proximal tibia in sitting gr 2  Knee flexion to endrange in sitting 10\"x10   AP, PA proximal tibia in supine gr 2-3  Knee flexion to endrange in sitting 10\"x10    AP, PA proximal tibia in sitting gr 2  R knee flexion in sitting gr 2-3     Therapeutic Exercise Minutes 30 30 45 30   Manual Therapy Minutes 15 8 10 10   Total Time Of Timed Procedures 45 38 55 40   Total Time Of Service-Based Procedures 0 0 0 0   Total Treatment Time 45 38 55 40        HEP  Access Code: [email or web address]  URL: [email or web address]  Date: 05/02/2025  Prepared by: Iglesia Deng    Exercises  - Supine Quad Set  - 1 x daily - 7 x weekly - 3 sets - 10 reps  - Supine Heel Slide with Strap  - 1 x daily - 7 x weekly - 3 sets - 10 reps  - Seated Heel Slide  - 1 x daily - 7 x weekly - 3 sets - 10 reps  - Mini Squat with Counter Support  - 1 x daily - 7 x weekly - 3 sets - 10 reps  - Heel Raises with Counter Support  - 1 x daily - 7 x weekly - 3 sets - 10 reps  - Gastroc Stretch on Wall  - 1 x daily - 7 x weekly - 3 sets - 10 reps    Charges     1man,2therex

## 2025-05-27 ENCOUNTER — OFFICE VISIT (OUTPATIENT)
Dept: PHYSICAL THERAPY | Age: 75
End: 2025-05-27
Attending: FAMILY MEDICINE
Payer: MEDICARE

## 2025-05-27 DIAGNOSIS — Z98.890 H/O KNEE SURGERY: Primary | ICD-10-CM

## 2025-05-27 PROCEDURE — 97110 THERAPEUTIC EXERCISES: CPT

## 2025-05-27 PROCEDURE — 97140 MANUAL THERAPY 1/> REGIONS: CPT

## 2025-05-27 NOTE — PROGRESS NOTES
Patient: Felicia Velez (74 year old, female) Referring Provider:  Insurance:   Diagnosis: H/O knee surgery (Z98.890) Cynthia Omar  [facility]   Date of Surgery: 3/14/25 R TKA revision, R TKA 2013 Next MD visit:  N/A   Precautions:  None 5/6/2025 Referral Information:    Date of Evaluation: Req: 8, Auth: 8, Exp: 7/2/2025 05/02/25 POC Auth Visits:  8       Today's Date   5/27/2025    Subjective  Pt states she stayed in bed a lot this weekend, didn't feel like doing anything, but her knee was feeling ok. Did some chores but relaxed a lot.       Pain: 0/10     Objective          10 wks postop 5-23-25   SEE TX LOG     Assessment   Pt reports minimal mobility this past weekend. Pt demo/reports fatigue c 2 minute clinic walk c cane. Encouraged daily walking , goal of 10 minutes cumulatively. Pt educated that increased walking is needed to build strength and decr risk of blood clot. Pt verbalizes understanding. Pt requires min A c tandem balance c surgical leg in posterior position.     Goals (to be met in 12 visits)        Not Met Progress Toward Partially Met Met   Pt will demonstrate tolerating standing >30 minutes with minimal to no pain to be able to return to prior level of function and be able to perform household activities like cooking.  []  []  []  []    Pt will reporting walking >30 minutes with straight cane with minimal to no pain to be able to ambulate safely within the community.  []  []  []  []    Pt will improve quad strength to 5/5 to ascend 1 flight of stairs reciprocally without UE assist. []  []  []  []    Pt will increase hip and knee strength to grossly 4+/5 to be able to get up and down from the floor safely. []  []  []  []    Pt will report participating in aquatics for >1 hour with minimal to no pain to be able to return to prior level of physical activity.  []  []  []  []    Pt will demonstrate ambulating with no assistive device use to be able to return to prior level of independence.  []   []  []  []    Pt will demonstrate 5x STS <12 seconds with no UE support to be able to safely perform sit to stand transfers at home, within the community and report decrease risk for falls.  []  []  []  []    Patient will demonstrate a score of 37 or higher in the LEFS outcome measure to report significant difference and low concern for condition. (Eval 28)    []  []  []  []    Pt will be independent and compliant with comprehensive HEP to maintain progress achieved in PT. []  []  []  []               Plan   Progress LLE strength and walking endurance as rafael    Treatment Last 4 Visits  Treatment Day: 7       5/16/2025 5/19/2025 5/22/2025 5/27/2025   LE Treatment   Therapeutic Exercise Nustep level 4, 6 minutes  R SAQ 1# 5\"x2min  Bridges x10  Sit to stands 2x10  Seated hamstring curl 2x10 red R  Clinic walking c SBQC 30ft x5 supervision    Standing hip abd x20 each  Standing hip ext x20 each  Standing march 1min  Minisquats 2x10 Nustep level 6, 10 minutes     R SAQ 1# 5\" 2x10  Bridges 2x10   Sit to stands 2x10   Seated hamstring curl 2x10 red R     Clinic walking c SBQC 30ft x5 supervision      Standing hip abd x20 each   Standing hip ext x20 each     8in step forward knee flexion stretch 2x10    In parallel bars  -4in forward step up B UE support 2x10 R  -4in lat step up B UE support 2x10 R Nustep level 6, 7 minutes   Hooklying c knees flexed to max, +ankle DF x20  Bridges 2x10   R SLR 1x10  R LAQ 1# 5\" 2x10  Seated hamstring curl 2x10 red R   Sit to stands 2x10   Standing hip abd x20 each  Standing hip ext x20 each   8in step forward knee flexion stretch 10\"x5  In parallel bars  -4in forward step up B UE support 2x10 R  -4in lat step up B UE support 2x10 R  Clinic walking c SBQC 2min Nustep level 6, 10 minutes   Hooklying c knees flexed to max, +ankle DF x20  Bridges 2x10   R SLR 1x10  R LAQ 2# 5\" x2min  Seated hamstring curl 2x10 red R   Sit to stands 2x10   Clinic walking c SBQC 2min  Tandem balance 1' ea CGA-min  A  Minisquats x10  4in forward step up B UE support 2x10 R  4in lat step up B UE support 2x10 R  Alt 4\" taps x10 CGA   Manual Therapy Scar mob in sitting c knee flexed  AP, PA proximal tibia in sitting gr 2  Knee flexion to endrange in sitting 10\"x10   AP, PA proximal tibia in supine gr 2-3  Knee flexion to endrange in sitting 10\"x10    AP, PA proximal tibia in sitting gr 2  R knee flexion in sitting gr 2-3   Seated L knee flexion gr 2-3   Therapeutic Exercise Minutes 30 45 30 35   Manual Therapy Minutes 8 10 10 10   Total Time Of Timed Procedures 38 55 40 45   Total Time Of Service-Based Procedures 0 0 0 0   Total Treatment Time 38 55 40 45        HEP  Access Code: [email or web address]  URL: [email or web address]  Date: 05/02/2025  Prepared by: Iglesia Deng    Exercises  - Supine Quad Set  - 1 x daily - 7 x weekly - 3 sets - 10 reps  - Supine Heel Slide with Strap  - 1 x daily - 7 x weekly - 3 sets - 10 reps  - Seated Heel Slide  - 1 x daily - 7 x weekly - 3 sets - 10 reps  - Mini Squat with Counter Support  - 1 x daily - 7 x weekly - 3 sets - 10 reps  - Heel Raises with Counter Support  - 1 x daily - 7 x weekly - 3 sets - 10 reps  - Gastroc Stretch on Wall  - 1 x daily - 7 x weekly - 3 sets - 10 reps    Charges     2therex,1man

## 2025-05-29 ENCOUNTER — OFFICE VISIT (OUTPATIENT)
Dept: PHYSICAL THERAPY | Age: 75
End: 2025-05-29
Attending: FAMILY MEDICINE
Payer: MEDICARE

## 2025-05-29 PROCEDURE — 97110 THERAPEUTIC EXERCISES: CPT

## 2025-05-29 NOTE — PROGRESS NOTES
Progress Summary  Pt has attended 8 visits in Physical Therapy.         Patient: Felicia Velez (74 year old, female) Referring Provider:  Insurance:   Diagnosis: H/O knee surgery (Z98.890) Cynthia Nelson  [facility]   Date of Surgery: 3/14/25 R TKA revision, R TKA 2013 Next MD visit:  N/A   Precautions:  None 5/6/2025 Referral Information:    Date of Evaluation: Req: 8, Auth: 8, Exp: 7/2/2025 05/02/25 POC Auth Visits:  8       Today's Date   5/29/2025    Subjective  Pt reporting the knee feels good this morning.       Pain: 0/10     Objective          Knee       5/9/2025 5/19/2025 5/29/2025   Knee ROM/MMT   Rt Knee Flexion 95 80->95       pre-> post manual 95   Rt Knee Flexion MMT   4-   Lt Knee Flexion MMT   4-   Rt Knee Extension (L3)   0   Lt Knee Extension (L3)   0   Rt Knee Extension MMT (L3)   4-   Lt Knee Extension MMT (L3)   4-          Assessment   Pt arriving 25 minutes late to session due to flat tire, agreeable to remainder of session.Pt completing 8 physical therapy visits for R post op TKA. Pt demonstrates improvement in knee ROM and strength thus far since starting therapy. However, still continues to have deficits in the following activities, prolonged walking and standing, stair ambulation, squatting, ambulating with no assistive device, and performing household activities like cooking and cleaning with no pain or discomfort. Pt is progressing towards goals established at initial evaluation. Continuing to work on knee ROM, strength, balance, stair ambulation and overall standing and walking endurance. Pt continues to work on HEP outside of therapy. Pt has had significant improvements since starting therapy but continues to demonstrate and report deficits as shown above. Requesting 8 additional visits for a total of 16 visits.       Goals (to be met in 16 visits)      Not Met Progress Toward Partially Met Met   Pt will demonstrate tolerating standing >30 minutes with minimal to no pain to be  able to return to prior level of function and be able to perform household activities like cooking.  [] [x] [] []   Pt will reporting walking >30 minutes with straight cane with minimal to no pain to be able to ambulate safely within the community.  [] [x] [] []   Pt will improve quad strength to 5/5 to ascend 1 flight of stairs reciprocally without UE assist. [] [x] [] []   Pt will increase hip and knee strength to grossly 4+/5 to be able to get up and down from the floor safely. [] [x] [] []   Pt will report participating in aquatics for >1 hour with minimal to no pain to be able to return to prior level of physical activity.  [] [x] [] []   Pt will demonstrate ambulating with no assistive device use to be able to return to prior level of independence.  [] [x] [] []   Pt will demonstrate 5x STS <12 seconds with no UE support to be able to safely perform sit to stand transfers at home, within the community and report decrease risk for falls.  [] [x] [] []   Patient will demonstrate a score of 37 or higher in the LEFS outcome measure to report significant difference and low concern for condition. (Eval 28)   [] [x] [] []   Pt will be independent and compliant with comprehensive HEP to maintain progress achieved in PT. [] [x] [] []                      Plan  cont to progress functional strength as rafael and initiate balance activities    Treatment Last 4 Visits  Treatment Day: 8       5/19/2025 5/22/2025 5/27/2025 5/29/2025   LE Treatment   Therapeutic Exercise Nustep level 6, 10 minutes     R SAQ 1# 5\" 2x10  Bridges 2x10   Sit to stands 2x10   Seated hamstring curl 2x10 red R     Clinic walking c SBQC 30ft x5 supervision      Standing hip abd x20 each   Standing hip ext x20 each     8in step forward knee flexion stretch 2x10    In parallel bars  -4in forward step up B UE support 2x10 R  -4in lat step up B UE support 2x10 R Nustep level 6, 7 minutes   Hooklying c knees flexed to max, +ankle DF x20  Bridges 2x10   R SLR  1x10  R LAQ 1# 5\" 2x10  Seated hamstring curl 2x10 red R   Sit to stands 2x10   Standing hip abd x20 each  Standing hip ext x20 each   8in step forward knee flexion stretch 10\"x5  In parallel bars  -4in forward step up B UE support 2x10 R  -4in lat step up B UE support 2x10 R  Clinic walking c SBQC 2min Nustep level 6, 10 minutes   Hooklying c knees flexed to max, +ankle DF x20  Bridges 2x10   R SLR 1x10  R LAQ 2# 5\" x2min  Seated hamstring curl 2x10 red R   Sit to stands 2x10   Clinic walking c SBQC 2min  Tandem balance 1' ea CGA-min A  Minisquats x10  4in forward step up B UE support 2x10 R  4in lat step up B UE support 2x10 R  Alt 4\" taps x10 CGA NuStep level 6, 5 minutes    PN assessment, 5sts assessment    In parallel bars  -4in step taps 2x20 alt unilat UE support   -4in lat step up 2x10 R B UE support   -4in step down forward 2x10 R B UE support    Manual Therapy AP, PA proximal tibia in supine gr 2-3  Knee flexion to endrange in sitting 10\"x10    AP, PA proximal tibia in sitting gr 2  R knee flexion in sitting gr 2-3   Seated L knee flexion gr 2-3    Therapeutic Exercise Minutes 45 30 35 20   Manual Therapy Minutes 10 10 10    Total Time Of Timed Procedures 55 40 45 20   Total Time Of Service-Based Procedures 0 0 0 0   Total Treatment Time 55 40 45 20        HEP  Access Code: [email or web address]  URL: [email or web address]  Date: 05/02/2025  Prepared by: Iglesia Deng    Exercises  - Supine Quad Set  - 1 x daily - 7 x weekly - 3 sets - 10 reps  - Supine Heel Slide with Strap  - 1 x daily - 7 x weekly - 3 sets - 10 reps  - Seated Heel Slide  - 1 x daily - 7 x weekly - 3 sets - 10 reps  - Mini Squat with Counter Support  - 1 x daily - 7 x weekly - 3 sets - 10 reps  - Heel Raises with Counter Support  - 1 x daily - 7 x weekly - 3 sets - 10 reps  - Gastroc Stretch on Wall  - 1 x daily - 7 x weekly - 3 sets - 10 reps    Charges     1 therex                    Plan: Continue skilled Physical Therapy 2  x/week or a total of 16 visits over a 90 day period. Treatment will include: Gait training; Manual Therapy; Neuromuscular Re-education; Therapeutic Exercise; Therapeutic Activities; Home Exercise Program instruction; Electrical stimulation (unattended); Electrical stimulation (attended); Ultrasound; Patient/Family Education     Patient/Family/Caregiver was advised of these findings, precautions, and treatment options and has agreed to actively participate in planning and for this course of care.    Thank you for your referral. If you have any questions, please contact me at Dept: [phone].    Sincerely,  Electronically signed by therapist: Iglesia Deng PT     Physician's certification required:  Yes  Please co-sign or sign and return this letter via fax as soon as possible to [phone].   I certify the need for these services furnished under this plan of treatment and while under my care.    X___________________________________________________ Date____________________    Certification From: 5/29/2025  To:8/27/2025

## 2025-06-03 ENCOUNTER — OFFICE VISIT (OUTPATIENT)
Dept: PHYSICAL THERAPY | Age: 75
End: 2025-06-03
Attending: FAMILY MEDICINE
Payer: MEDICARE

## 2025-06-03 DIAGNOSIS — Z98.890 H/O KNEE SURGERY: Primary | ICD-10-CM

## 2025-06-03 PROCEDURE — 97110 THERAPEUTIC EXERCISES: CPT

## 2025-06-03 NOTE — PROGRESS NOTES
Patient: Felicia Velez (74 year old, female) Referring Provider:  Insurance:   Diagnosis: H/O knee surgery (Z98.890) Cynthia Omar  [facility]   Date of Surgery: 3/14/25 R TKA revision, R TKA 2013 Next MD visit:  N/A   Precautions:  None 5/6/2025 Referral Information:    Date of Evaluation: Req: 16, Auth: 16, Exp: 7/29/2025 05/02/25 POC Auth Visits:  16       Today's Date   6/3/2025    Subjective  Pt states she was on her feet a lot on Saturday, so the leg 'swelled up a lot', so she rested a lot the next day.       Pain: 0/10     Objective       11 wks postop 5-30-25   SEE TX LOG     Assessment   Pt quite fatigued at end of session; reports episode of incr swelling over weekend 2o excessive standing/walking. No acute localized swelling noted, incision is well-healed. Pt enters c RW, demo good form c ambulation in clinic c cane, but fatigues easily. She tolerated 4\" stepups c only 1 UE contact to railing instead of 2, but does fatigue easily/requires extra time, brief pauses.    Goals (to be met in 16 visits)        Not Met Progress Toward Partially Met Met   Pt will demonstrate tolerating standing >30 minutes with minimal to no pain to be able to return to prior level of function and be able to perform household activities like cooking.  []  []  []  []    Pt will reporting walking >30 minutes with straight cane with minimal to no pain to be able to ambulate safely within the community.  []  []  []  []    Pt will improve quad strength to 5/5 to ascend 1 flight of stairs reciprocally without UE assist. []  []  []  []    Pt will increase hip and knee strength to grossly 4+/5 to be able to get up and down from the floor safely. []  []  []  []    Pt will report participating in aquatics for >1 hour with minimal to no pain to be able to return to prior level of physical activity.  []  []  []  []    Pt will demonstrate ambulating with no assistive device use to be able to return to prior level of independence.  []  []   []  []    Pt will demonstrate 5x STS <12 seconds with no UE support to be able to safely perform sit to stand transfers at home, within the community and report decrease risk for falls.  []  []  []  []    Patient will demonstrate a score of 37 or higher in the LEFS outcome measure to report significant difference and low concern for condition. (Eval 28)    []  []  []  []    Pt will be independent and compliant with comprehensive HEP to maintain progress achieved in PT. []  []  []  []                   Plan   Cont to progress walking tolerance, functional strength RLE as rafael    Treatment Last 4 Visits  Treatment Day: 9       5/22/2025 5/27/2025 5/29/2025 6/3/2025   LE Treatment   Therapeutic Exercise Nustep level 6, 7 minutes   Hooklying c knees flexed to max, +ankle DF x20  Bridges 2x10   R SLR 1x10  R LAQ 1# 5\" 2x10  Seated hamstring curl 2x10 red R   Sit to stands 2x10   Standing hip abd x20 each  Standing hip ext x20 each   8in step forward knee flexion stretch 10\"x5  In parallel bars  -4in forward step up B UE support 2x10 R  -4in lat step up B UE support 2x10 R  Clinic walking c SBQC 2min Nustep level 6, 10 minutes   Hooklying c knees flexed to max, +ankle DF x20  Bridges 2x10   R SLR 1x10  R LAQ 2# 5\" x2min  Seated hamstring curl 2x10 red R   Sit to stands 2x10   Clinic walking c SBQC 2min  Tandem balance 1' ea CGA-min A  Minisquats x10  4in forward step up B UE support 2x10 R  4in lat step up B UE support 2x10 R  Alt 4\" taps x10 CGA NuStep level 6, 5 minutes    PN assessment, 5sts assessment    In parallel bars  -4in step taps 2x20 alt unilat UE support   -4in lat step up 2x10 R B UE support   -4in step down forward 2x10 R B UE support  Nustep level 6, 10 minutes   R LAQ 2# 5\" x2min  Seated hamstring curl GTB 2x10  Sit to stands 2x10   Clinic walking c SBQC 3min  Tandem balance 1' ea SBA  4in forward step up 1UE support 2x10 R  4in lat step up 1UE support 2x10 R   Manual Therapy AP, PA proximal tibia in  sitting gr 2  R knee flexion in sitting gr 2-3   Seated L knee flexion gr 2-3  Knee flexion in sitting gr 2-3   Therapeutic Exercise Minutes 30 35 20 42   Manual Therapy Minutes 10 10  5   Total Time Of Timed Procedures 40 45 20 47   Total Time Of Service-Based Procedures 0 0 0 0   Total Treatment Time 40 45 20 47        HEP  Access Code: [email or web address]  URL: [email or web address]  Date: 05/02/2025  Prepared by: Iglesia Deng    Exercises  - Supine Quad Set  - 1 x daily - 7 x weekly - 3 sets - 10 reps  - Supine Heel Slide with Strap  - 1 x daily - 7 x weekly - 3 sets - 10 reps  - Seated Heel Slide  - 1 x daily - 7 x weekly - 3 sets - 10 reps  - Mini Squat with Counter Support  - 1 x daily - 7 x weekly - 3 sets - 10 reps  - Heel Raises with Counter Support  - 1 x daily - 7 x weekly - 3 sets - 10 reps  - Gastroc Stretch on Wall  - 1 x daily - 7 x weekly - 3 sets - 10 reps    Charges     3therex

## 2025-06-05 ENCOUNTER — OFFICE VISIT (OUTPATIENT)
Dept: PHYSICAL THERAPY | Age: 75
End: 2025-06-05
Attending: FAMILY MEDICINE
Payer: MEDICARE

## 2025-06-05 PROCEDURE — 97110 THERAPEUTIC EXERCISES: CPT

## 2025-06-05 NOTE — PROGRESS NOTES
Patient: Felicia Velez (74 year old, female) Referring Provider:  Insurance:   Diagnosis: H/O knee surgery (Z98.890) Cynthia [facility]   Date of Surgery: 3/14/25 R TKA revision, R TKA 2013 Next MD visit:  N/A   Precautions:  None 5/6/2025 Referral Information:    Date of Evaluation: Req: 16, Auth: 16, Exp: 7/29/2025 05/02/25 POC Auth Visits:  16       Today's Date   6/5/2025    Subjective  Pt states she is using the cane inside the house all the time. Outside of the house she will take her cane if she will be shopping with a cart, otherwise she still uses the walker outside of the house. Before the surgery she used the walker for almost 7 months 2o her knee, but she would like to be off of the walker. She doesn't really get pain in the knee anymore, but it will swell c excessive activity.       Pain: 0/10     Objective                    Knee ROM    5-2-25 5-13-25 5-22-25 5-29-25 6-5-25    R L R R R R     Flex 80 95 92P 95P 95P 90A/100P     Ext (L3) 0 0           11 wks postop 5-30-25   SEE TX LOG     Assessment   Pt's passive R knee flexion cont to gradually progress. Considering ROM of opposite knee, she may be reaching functional maximum; LE bulk/edema also affects measurement. Main impairment at this time is walking tolerance and balance. She demo improved form c ambulation c S.C. as compared to SBQC, but duration/endurance is limited. Tolerated progression from 4 \" to 6\" step but requires 1UE min A.    Goals (to be met in 16 visits)        Not Met Progress Toward Partially Met Met   Pt will demonstrate tolerating standing >30 minutes with minimal to no pain to be able to return to prior level of function and be able to perform household activities like cooking.  []  []  []  []    Pt will reporting walking >30 minutes with straight cane with minimal to no pain to be able to ambulate safely within the community.  []  []  []  []    Pt will improve quad strength to 5/5 to ascend 1 flight of stairs  reciprocally without UE assist. []  []  []  []    Pt will increase hip and knee strength to grossly 4+/5 to be able to get up and down from the floor safely. []  []  []  []    Pt will report participating in aquatics for >1 hour with minimal to no pain to be able to return to prior level of physical activity.  []  []  []  []    Pt will demonstrate ambulating with no assistive device use to be able to return to prior level of independence.  []  []  []  []    Pt will demonstrate 5x STS <12 seconds with no UE support to be able to safely perform sit to stand transfers at home, within the community and report decrease risk for falls.  []  []  []  []    Patient will demonstrate a score of 37 or higher in the LEFS outcome measure to report significant difference and low concern for condition. (Eval 28)    []  []  []  []    Pt will be independent and compliant with comprehensive HEP to maintain progress achieved in PT. []  []  []  []                       Plan   Continue to progress walking tolerance and dynamic balance, strength for 6\" steps.    Treatment Last 4 Visits  Treatment Day: 10       5/27/2025 5/29/2025 6/3/2025 6/5/2025   LE Treatment   Therapeutic Exercise Nustep level 6, 10 minutes   Hooklying c knees flexed to max, +ankle DF x20  Bridges 2x10   R SLR 1x10  R LAQ 2# 5\" x2min  Seated hamstring curl 2x10 red R   Sit to stands 2x10   Clinic walking c SBQC 2min  Tandem balance 1' ea CGA-min A  Minisquats x10  4in forward step up B UE support 2x10 R  4in lat step up B UE support 2x10 R  Alt 4\" taps x10 CGA NuStep level 6, 5 minutes    PN assessment, 5sts assessment    In parallel bars  -4in step taps 2x20 alt unilat UE support   -4in lat step up 2x10 R B UE support   -4in step down forward 2x10 R B UE support  Nustep level 6, 10 minutes   R LAQ 2# 5\" x2min  Seated hamstring curl GTB 2x10  Sit to stands 2x10   Clinic walking c SBQC 3min  Tandem balance 1' ea SBA  4in forward step up 1UE support 2x10 R  4in lat step  up 1UE support 2x10 R Nustep level 6, 10 minutes   Sit to stands 2x10   R LAQ 2# 5\" x2min  Seated hamstring curl GTB 2x10  Clinic walking c SBQC 3min  Clinic walk c S.C. 2min  Fwd step-and-return c BUE reach 1min  Lat step-and-return c BUE reach 1min  6in forward step up 1UE support 1x10 R  6in lat step up 1UE support 2x10 R   Manual Therapy Seated L knee flexion gr 2-3  Knee flexion in sitting gr 2-3 R knee flexion in sitting gr 2-3   Therapeutic Exercise Minutes 35 20 42 45   Manual Therapy Minutes 10  5 5   Total Time Of Timed Procedures 45 20 47 50   Total Time Of Service-Based Procedures 0 0 0 0   Total Treatment Time 45 20 47 50        HEP  Access Code: [email or web address]  URL: [email or web address]  Date: 05/02/2025  Prepared by: Iglesia Deng    Exercises  - Supine Quad Set  - 1 x daily - 7 x weekly - 3 sets - 10 reps  - Supine Heel Slide with Strap  - 1 x daily - 7 x weekly - 3 sets - 10 reps  - Seated Heel Slide  - 1 x daily - 7 x weekly - 3 sets - 10 reps  - Mini Squat with Counter Support  - 1 x daily - 7 x weekly - 3 sets - 10 reps  - Heel Raises with Counter Support  - 1 x daily - 7 x weekly - 3 sets - 10 reps  - Gastroc Stretch on Wall  - 1 x daily - 7 x weekly - 3 sets - 10 reps    Charges     3therex

## 2025-06-09 DIAGNOSIS — E11.42 TYPE 2 DIABETES MELLITUS WITH DIABETIC POLYNEUROPATHY, WITHOUT LONG-TERM CURRENT USE OF INSULIN (HCC): ICD-10-CM

## 2025-06-09 RX ORDER — MELOXICAM 7.5 MG/1
7.5 TABLET ORAL DAILY
Qty: 30 TABLET | Refills: 2 | OUTPATIENT
Start: 2025-06-09

## 2025-06-09 NOTE — TELEPHONE ENCOUNTER
Name from pharmacy: MELOXICAM 7.5MG TABLETS          Will file in chart as: MELOXICAM 7.5 MG Oral Tab    The original prescription was discontinued on 4/9/2025 by Cynthia Nelson MD. Renewing this prescription may not be appropriate.    Sig: TAKE 1 TABLET(7.5 MG) BY MOUTH DAILY    Disp: 30 tablet    Refills: 2 (Pharmacy requested: Not specified)    Start: 6/9/2025    Class: Normal    Non-formulary    Last ordered: 2 months ago (3/14/2025) by Cynthia Nelson MD    Last refill: 5/13/2025    Rx #: [id number]    Non-Narcotic Pain Medication Protocol Failed 06/09/2025 05:51 AM   Protocol Details Medication is active on med list    In person appointment or virtual visit in the past 6 mos or appointment in next 3 mos      To be filled at: [id number] - [place], [place] - [address] AT [facility] [address], [phone], [phone]

## 2025-06-10 ENCOUNTER — OFFICE VISIT (OUTPATIENT)
Dept: PHYSICAL THERAPY | Age: 75
End: 2025-06-10
Attending: FAMILY MEDICINE
Payer: MEDICARE

## 2025-06-10 PROCEDURE — 97110 THERAPEUTIC EXERCISES: CPT

## 2025-06-10 RX ORDER — LANCETS
1 EACH MISCELLANEOUS DAILY
Qty: 100 EACH | Refills: 0 | Status: SHIPPED | OUTPATIENT
Start: 2025-06-10

## 2025-06-10 RX ORDER — BLOOD SUGAR DIAGNOSTIC
STRIP MISCELLANEOUS DAILY
Qty: 100 STRIP | Refills: 0 | Status: SHIPPED | OUTPATIENT
Start: 2025-06-10

## 2025-06-10 RX ORDER — TOPIRAMATE 50 MG/1
50 TABLET, FILM COATED ORAL 2 TIMES DAILY
Qty: 180 TABLET | Refills: 0 | Status: SHIPPED | OUTPATIENT
Start: 2025-06-10

## 2025-06-10 NOTE — PROGRESS NOTES
Patient: Felicia Velez (74 year old, female) Referring Provider:  Insurance:   Diagnosis: H/O knee surgery (Z98.890) Cynthia Omar  [facility]   Date of Surgery: 3/14/25 R TKA revision, R TKA 2013 Next MD visit:  N/A   Precautions:  None 5/6/2025 Referral Information:    Date of Evaluation: Req: 16, Auth: 16, Exp: 7/29/2025 05/02/25 POC Auth Visits:  16       Today's Date   6/10/2025    Subjective  Pt states she had BLE swelling over the weekend after standing a long period in the kitchen, especially her nonsurgical leg. SHe is still on blood thinner for another month, since DVT on surgical leg was noted. Swelling on nonsurgical leg she states started shortly after the DVT on other leg was identified. She hasn't worn compression socks since her sutures were removed. Denies any chest pain or pain in nonsurgical leg. She knows she didn't drink enough water yesterday. She hasn't checked her blood glucose yet today. She had a banana before coming.       Pain: 0/10     Objective       SpO2 98%, HR 88 p 3 min clinic walk, SOB ; /70 ; RR recovers after sitting rest <2min. Resting HR 74bpm    12 wks postop 6-6-25   SEE TX LOG     Assessment   Pt seems to be ambulating more slowly today than previous sessions c easier SOB during exercise, such as short 3 min clinic walk. All vitals are WNL and recover well c sitting break. Denies any chest pain or leg/calf pain. Pt feels she is likely dehydrated after minimal water yesterday. She also reports BLE swelling, but that this has significantly improved since yesterday. Decreased exercise tolerance overall today; encouraged pt to consider urgent care if any pain in chest or leg onsets, or symptoms worsen or has any concerns. Pt verbalized understanding.    Goals (to be met in 16 visits)        Not Met Progress Toward Partially Met Met   Pt will demonstrate tolerating standing >30 minutes with minimal to no pain to be able to return to prior level of function and be able  to perform household activities like cooking.  []  []  []  []    Pt will reporting walking >30 minutes with straight cane with minimal to no pain to be able to ambulate safely within the community.  []  []  []  []    Pt will improve quad strength to 5/5 to ascend 1 flight of stairs reciprocally without UE assist. []  []  []  []    Pt will increase hip and knee strength to grossly 4+/5 to be able to get up and down from the floor safely. []  []  []  []    Pt will report participating in aquatics for >1 hour with minimal to no pain to be able to return to prior level of physical activity.  []  []  []  []    Pt will demonstrate ambulating with no assistive device use to be able to return to prior level of independence.  []  []  []  []    Pt will demonstrate 5x STS <12 seconds with no UE support to be able to safely perform sit to stand transfers at home, within the community and report decrease risk for falls.  []  []  []  []    Patient will demonstrate a score of 37 or higher in the LEFS outcome measure to report significant difference and low concern for condition. (Eval 28)    []  []  []  []    Pt will be independent and compliant with comprehensive HEP to maintain progress achieved in PT. []  []  []  []                           Plan   Cont to progress RLE strength, walking tolerance as able    Treatment Last 4 Visits  Treatment Day: 11       5/29/2025 6/3/2025 6/5/2025 6/10/2025   LE Treatment   Therapeutic Exercise NuStep level 6, 5 minutes    PN assessment, 5sts assessment    In parallel bars  -4in step taps 2x20 alt unilat UE support   -4in lat step up 2x10 R B UE support   -4in step down forward 2x10 R B UE support  Nustep level 6, 10 minutes   R LAQ 2# 5\" x2min  Seated hamstring curl GTB 2x10  Sit to stands 2x10   Clinic walking c SBQC 3min  Tandem balance 1' ea SBA  4in forward step up 1UE support 2x10 R  4in lat step up 1UE support 2x10 R Nustep level 6, 10 minutes   Sit to stands 2x10   R LAQ 2# 5\"  x2min  Seated hamstring curl GTB 2x10  Clinic walking c SBQC 3min  Clinic walk c S.C. 2min  Fwd step-and-return c BUE reach 1min  Lat step-and-return c BUE reach 1min  6in forward step up 1UE support 1x10 R  6in lat step up 1UE support 2x10 R Nustep level 6, 8 minutes   Sit to stands 2x10   R LAQ 2# 5\" x2min  Clinic walk c S.C. 3min  6in forward step up 1UE support 1x10 R  6in lat step up 1UE support 1x10 R  Assessment, vitals, etc   Manual Therapy  Knee flexion in sitting gr 2-3 R knee flexion in sitting gr 2-3    Therapeutic Exercise Minutes 20 42 45 38   Manual Therapy Minutes  5 5    Total Time Of Timed Procedures 20 47 50 38   Total Time Of Service-Based Procedures 0 0 0 0   Total Treatment Time 20 47 50 38        HEP  Access Code: [email or web address]  URL: [email or web address]  Date: 05/02/2025  Prepared by: Iglesia Deng    Exercises  - Supine Quad Set  - 1 x daily - 7 x weekly - 3 sets - 10 reps  - Supine Heel Slide with Strap  - 1 x daily - 7 x weekly - 3 sets - 10 reps  - Seated Heel Slide  - 1 x daily - 7 x weekly - 3 sets - 10 reps  - Mini Squat with Counter Support  - 1 x daily - 7 x weekly - 3 sets - 10 reps  - Heel Raises with Counter Support  - 1 x daily - 7 x weekly - 3 sets - 10 reps  - Gastroc Stretch on Wall  - 1 x daily - 7 x weekly - 3 sets - 10 reps    Charges     3therex

## 2025-06-10 NOTE — TELEPHONE ENCOUNTER
Topiramate 50 mg  Filled 4-24-24  Qty 180  0 refills  Future Appointments   Date Time Provider Department Center   6/10/2025  8:45 AM Erinn Byrne, PT [facility] [place]   6/12/2025  8:45 AM Erinn Byrne, PT [facility] [place]   7/1/2025  7:15 AM Erinn Byrne, PT [facility] [place]   7/8/2025  7:15 AM Erinn Byrne, PT [facility] [place]   7/10/2025  7:15 AM Erinn Byrne, PT [facility] [place]   7/15/2025  7:15 AM Erinn Byrne, PT [facility] [place]   7/17/2025  7:15 AM Erinn Byrne, PT [facility] [place]   8/1/2025 10:00 AM Desiree Hu APRN [facility]   LOV 4-9-25 TO

## 2025-06-12 ENCOUNTER — OFFICE VISIT (OUTPATIENT)
Dept: PHYSICAL THERAPY | Age: 75
End: 2025-06-12
Attending: FAMILY MEDICINE
Payer: MEDICARE

## 2025-06-12 PROCEDURE — 97110 THERAPEUTIC EXERCISES: CPT

## 2025-06-12 NOTE — PROGRESS NOTES
Patient: Felicia Velez (74 year old, female) Referring Provider:  Insurance:   Diagnosis: H/O knee surgery (Z98.890) Cynthia Omar  [facility]   Date of Surgery: 3/14/25 R TKA revision, R TKA 2013 Next MD visit:  N/A   Precautions:  None none scheduled (but will see surgeon ~7-6-25) Referral Information:    Date of Evaluation: Req: 16, Auth: 16, Exp: 7/29/2025 05/02/25 POC Auth Visits:  16       Today's Date   6/12/2025    Subjective  Pt arrives 15' late, accommodated. She reports no pain today.       Pain: 0/10     Objective       12 wks postop 6-6-25   SpO2 97%, HR 77bpm, /70  SEE TX LOG     Assessment  Pt demo overpronation B in relatively narrow gym shoes worn today. Advised a more supportive athletic shoe c better arch support (possibly size wide if needed) to improve foot support which will also decr valgus force at knee. Pt verbalized understanding. Pt demo incr L ankle swelling today (nonsurgical side). Overall she has been reporting BLE swelling this week, which coincides from incr in fatigue in therapy this week. As all vitals are WNL, encouraged pt to frequently weigh herself as a way to help monitor swelling, and to communicate with her PCP regarding persistent swelling. Pt verbalized understanding.      Goals (to be met in 16 visits)        Not Met Progress Toward Partially Met Met   Pt will demonstrate tolerating standing >30 minutes with minimal to no pain to be able to return to prior level of function and be able to perform household activities like cooking.  []  []  []  []    Pt will reporting walking >30 minutes with straight cane with minimal to no pain to be able to ambulate safely within the community.  []  []  []  []    Pt will improve quad strength to 5/5 to ascend 1 flight of stairs reciprocally without UE assist. []  []  []  []    Pt will increase hip and knee strength to grossly 4+/5 to be able to get up and down from the floor safely. []  []  []  []    Pt will report  participating in aquatics for >1 hour with minimal to no pain to be able to return to prior level of physical activity.  []  []  []  []    Pt will demonstrate ambulating with no assistive device use to be able to return to prior level of independence.  []  []  []  []    Pt will demonstrate 5x STS <12 seconds with no UE support to be able to safely perform sit to stand transfers at home, within the community and report decrease risk for falls.  []  []  []  []    Patient will demonstrate a score of 37 or higher in the LEFS outcome measure to report significant difference and low concern for condition. (Eval 28)    []  []  []  []    Pt will be independent and compliant with comprehensive HEP to maintain progress achieved in PT. []  []  []  []                               Plan   Reassess next visit    Treatment Last 4 Visits  Treatment Day: 12       6/3/2025 6/5/2025 6/10/2025 6/12/2025   LE Treatment   Therapeutic Exercise Nustep level 6, 10 minutes   R LAQ 2# 5\" x2min  Seated hamstring curl GTB 2x10  Sit to stands 2x10   Clinic walking c SBQC 3min  Tandem balance 1' ea SBA  4in forward step up 1UE support 2x10 R  4in lat step up 1UE support 2x10 R Nustep level 6, 10 minutes   Sit to stands 2x10   R LAQ 2# 5\" x2min  Seated hamstring curl GTB 2x10  Clinic walking c SBQC 3min  Clinic walk c S.C. 2min  Fwd step-and-return c BUE reach 1min  Lat step-and-return c BUE reach 1min  6in forward step up 1UE support 1x10 R  6in lat step up 1UE support 2x10 R Nustep level 6, 8 minutes   Sit to stands 2x10   R LAQ 2# 5\" x2min  Clinic walk c S.C. 3min  6in forward step up 1UE support 1x10 R  6in lat step up 1UE support 1x10 R  Assessment, vitals, etc Clinic walk c S.C. 4min, 2min  Sit to stands 2x10   R LAQ 2# 5\" x2min    6in forward step up 1UE support 1x10 R  6in lat step up 1UE support 1x10 R     Manual Therapy Knee flexion in sitting gr 2-3 R knee flexion in sitting gr 2-3     Therapeutic Exercise Minutes 42 45 38    Manual  Therapy Minutes 5 5     Total Time Of Timed Procedures 47 50 38 0   Total Time Of Service-Based Procedures 0 0 0 0   Total Treatment Time 47 50 38 0        HEP  Access Code: [email or web address]  URL: [email or web address]  Date: 05/02/2025  Prepared by: Iglesia Deng    Exercises  - Supine Quad Set  - 1 x daily - 7 x weekly - 3 sets - 10 reps  - Supine Heel Slide with Strap  - 1 x daily - 7 x weekly - 3 sets - 10 reps  - Seated Heel Slide  - 1 x daily - 7 x weekly - 3 sets - 10 reps  - Mini Squat with Counter Support  - 1 x daily - 7 x weekly - 3 sets - 10 reps  - Heel Raises with Counter Support  - 1 x daily - 7 x weekly - 3 sets - 10 reps  - Gastroc Stretch on Wall  - 1 x daily - 7 x weekly - 3 sets - 10 reps    Charges     2therex

## 2025-06-16 ENCOUNTER — TELEPHONE (OUTPATIENT)
Dept: PHYSICAL THERAPY | Facility: HOSPITAL | Age: 75
End: 2025-06-16

## 2025-06-19 ENCOUNTER — OFFICE VISIT (OUTPATIENT)
Dept: PHYSICAL THERAPY | Age: 75
End: 2025-06-19
Attending: FAMILY MEDICINE
Payer: MEDICARE

## 2025-06-19 DIAGNOSIS — Z98.890 H/O KNEE SURGERY: Primary | ICD-10-CM

## 2025-06-19 PROCEDURE — 97110 THERAPEUTIC EXERCISES: CPT

## 2025-06-19 NOTE — PROGRESS NOTES
Patient: Felicia Velez (74 year old, female) Referring Provider:  Insurance:   Diagnosis: H/O knee surgery (Z98.890) Cynthia Omar  [facility]   Date of Surgery: 3/14/25 R TKA revision, R TKA 2013 Next MD visit:  N/A   Precautions:  None none scheduled (but will see surgeon ~7-6-25) Referral Information:    Date of Evaluation: Req: 16, Auth: 16, Exp: 7/29/2025 05/02/25 POC Auth Visits:  16       Today's Date   6/19/2025    Subjective  Pt states no new c/o today. Stairs remain difficult, 15 steps up to bedrooms, finds herself limiting to once a day typically. She continues to have increased swelling BLE on/off, elevation helps. She notes that her sister is heavy on the salt when she cooks for the family. She uses the cane most of the time, but still uses the walker every day; keeps the walker downstairs and uses it when on that level. Pt describes that she typically spends half of her day in bed/in bedroom before venturing downstairs.       Pain: 0/10     Objective        13 wks postop 6-13-25     Musculoskeletal                   Knee ROM    5-2-25 5-13-25 5-22-25 5-29-25 6-5-25 6-19-25    R L R R R R R     Flex 80 95 92P 95P 95P 90A/100P 97A/104P     Ext (L3) 0 0                        ,          Knee   MMT (-/5) @eval 5-29-25 6-19-25    R L R R     Flex 3+ 3+ 4- 4+     Ext (L3) 4- 4- 4- 4     Edema-Circumferential measures:  Superior to patella: 61.5cm / L 59.0cm   (vs eval: R 53  cm, L 49 cm)  Joint line:   R 48.7cm     (vs eval: R 45 cm, L 43 cm)  Inferior to patella: 46.0cm    (vs eval: R 46 cm, L 45 cm)                          Balance and Functional Mobility:  Gait: pt ambulates on level ground with slow speed, straight cane, decr stance time RLE   (vs eval: trendelenburg/waddle; stooped posture/forward lean; decreased stance phase; decreased step length (walker)               Stairs: pt can ascend 1:1 either R or L lead c min-mod A 1 railing and straight cane ; c cueing she can perform reciprocally s  pain, at very slow pace ; on descent pt prefers 1:1 R lead c min-mod A 1 railing and straight cane; she is able to perform reciprocal, but has poor eccentric quad control on R and is apprehensive c decr safety.      Functional Mobility:  5x sit to stand test: 12.5 sec (vs eval: 14 sec w/ UE use)         Assessment   Pt has been struggling c BLE swelling in recent weeks. She has no pain in LEs. She describes her daily routine as being very sedentary, often spending the first half of the day in bed and/or bedroom. Encouraged pt to improve mobility during the day for her general health, as well as to promote improved circulation/decreased swelling. Pt verbalized understanding. Pt overall is showing good progress in R knee ROM and strength as measured. She has limited endurance for ambulating, and does so at very slow pace. She can traverse stairs 1;1 2o decr eccentric quad strength on descent c BUE support to railing and cane. She would continue to benefit from skilled PT to further progress her functional mobility and incr quality of gait and walking endurance.    Goals (to be met in 20 visits)        Not Met Progress Toward Partially Met Met   Pt will demonstrate tolerating standing >30 minutes with minimal to no pain to be able to return to prior level of function and be able to perform household activities like cooking.  []  [x]  []  []    Pt will reporting walking >30 minutes with straight cane with minimal to no pain to be able to ambulate safely within the community.  []  [x]  []  []    Pt will improve quad strength to 5/5 to ascend 1 flight of stairs reciprocally without UE assist. []  [x]  []  []    Pt will increase hip and knee strength to grossly 4+/5 to be able to get up and down from the floor safely. []  [x]  []  []    Pt will report participating in aquatics for >1 hour with minimal to no pain to be able to return to prior level of physical activity.  [x]  []  []  []    Pt will demonstrate ambulating  with no assistive device use to be able to return to prior level of independence.  []  [x]  []  []    Pt will demonstrate 5x STS <12 seconds with no UE support to be able to safely perform sit to stand transfers at home, within the community and report decrease risk for falls.  []  [x]  []  []    Patient will demonstrate a score of 37 or higher in the LEFS outcome measure to report significant difference and low concern for condition. (Eval 28)    []  []  []  []    Pt will be independent and compliant with comprehensive HEP to maintain progress achieved in PT. []  []  []  []      Plan: Continue skilled Physical Therapy 1-2 x/week or a total of 20 visits over a 90 day period. Treatment will include: progression of LE strength and endurance to incr walking tolerance, ability to traverse stairs.       Patient/Family/Caregiver was advised of these findings, precautions, and treatment options and has agreed to actively participate in planning and for this course of care.    Thank you for your referral. If you have any questions, please contact me at Dept: [phone].    Sincerely,  Electronically signed by therapist: Erinn Byrne PT     Physician's certification required:  Yes  Please co-sign or sign and return this letter via fax as soon as possible to [phone].   I certify the need for these services furnished under this plan of treatment and while under my care.    X___________________________________________________ Date____________________    Certification From: 6/20/2025  To:9/18/2025     Treatment Last 4 Visits  Treatment Day: 13       6/5/2025 6/10/2025 6/12/2025 6/19/2025   LE Treatment   Therapeutic Exercise Nustep level 6, 10 minutes   Sit to stands 2x10   R LAQ 2# 5\" x2min  Seated hamstring curl GTB 2x10  Clinic walking c SBQC 3min  Clinic walk c S.C. 2min  Fwd step-and-return c BUE reach 1min  Lat step-and-return c BUE reach 1min  6in forward step up 1UE support 1x10 R  6in lat step up 1UE support  2x10 R Nustep level 6, 8 minutes   Sit to stands 2x10   R LAQ 2# 5\" x2min  Clinic walk c S.C. 3min  6in forward step up 1UE support 1x10 R  6in lat step up 1UE support 1x10 R  Assessment, vitals, etc Clinic walk c S.C. 4min, 2min  R LAQ 2# 5\" x2min  Supine R SLR 2x10  S/L R hip abd 2x10  Sit to stands 2x10     6in forward step up 1UE support 1x10 R  6in lat step up 1UE support 1x10 R  Fwd step-and-return c BUE reach 1min  Lat step-and-return c BUE reach 1min  6in forward step up 1UE support 1x10 R  6in lat step up 1UE support 2x10 R   NuStep for CKC strength, ROM, CV 10min  R SAQ 2# 5\"x2min  Bridges x20  Clinic walk c S.C. 3min  Sit to stands 1x10   Up/down stairwell-1flight   reassessment     Manual Therapy R knee flexion in sitting gr 2-3      Therapeutic Exercise Minutes 45 38 30 45   Manual Therapy Minutes 5      Total Time Of Timed Procedures 50 38 30 45   Total Time Of Service-Based Procedures 0 0 0 0   Total Treatment Time 50 38 30 45        HEP  Access Code: [email or web address]  URL: [email or web address]  Date: 05/02/2025  Prepared by: Iglesia Deng    Exercises  - Supine Quad Set  - 1 x daily - 7 x weekly - 3 sets - 10 reps  - Supine Heel Slide with Strap  - 1 x daily - 7 x weekly - 3 sets - 10 reps  - Seated Heel Slide  - 1 x daily - 7 x weekly - 3 sets - 10 reps  - Mini Squat with Counter Support  - 1 x daily - 7 x weekly - 3 sets - 10 reps  - Heel Raises with Counter Support  - 1 x daily - 7 x weekly - 3 sets - 10 reps  - Gastroc Stretch on Wall  - 1 x daily - 7 x weekly - 3 sets - 10 reps    Charges     3therex

## 2025-06-24 ENCOUNTER — TELEPHONE (OUTPATIENT)
Dept: PHYSICAL THERAPY | Facility: HOSPITAL | Age: 75
End: 2025-06-24

## 2025-06-26 DIAGNOSIS — E11.42 TYPE 2 DIABETES MELLITUS WITH DIABETIC POLYNEUROPATHY, WITHOUT LONG-TERM CURRENT USE OF INSULIN (HCC): ICD-10-CM

## 2025-06-27 RX ORDER — BLOOD SUGAR DIAGNOSTIC
STRIP MISCELLANEOUS DAILY
Qty: 100 STRIP | Refills: 0 | OUTPATIENT
Start: 2025-06-27

## 2025-07-08 ENCOUNTER — OFFICE VISIT (OUTPATIENT)
Dept: PHYSICAL THERAPY | Age: 75
End: 2025-07-08
Attending: FAMILY MEDICINE
Payer: MEDICARE

## 2025-07-08 PROCEDURE — 97110 THERAPEUTIC EXERCISES: CPT

## 2025-07-08 NOTE — PROGRESS NOTES
Progress Summary  Pt has attended 14 visits in Physical Therapy.       Patient: Felicia Velez (74 year old, female) Referring Provider:  Insurance:   Diagnosis: H/O knee surgery (Z98.890) Cynthia [facility]   Date of Surgery: 3/14/25 R TKA revision, R TKA 2013 Next MD visit:  N/A   Precautions:  None none scheduled (but will see surgeon ~7-6-25) Referral Information:    Date of Evaluation: Req: 16, Auth: 16, Exp: 7/29/2025 05/02/25 POC Auth Visits:  16       Today's Date   7/8/2025    Subjective  Pt returns today after 2.5 wk absence from PT. She reports she has gone downstairs more since last session , but staying inside 2o heat outside. She reports continued swelling BLE, though it does improve in the short-term p elevating. The RLE feels heavy today. Pt states she does not recall getting so out of breath when walking before her surgery. She used a cane for about 6 months prior to her surgery.       Pain: 0/10 Pt denies any pain in LEs    Objective       16 wks postop 7-4-25     Edema: mild pitting B lower legs  Vitals: p ~2 min rest from exercise SpO2 92%, HR 82BPM   ; p 2 min walk or 1 flight of stairs SpO2 90%, . Highest reading noted p more rest SpO2 94%.    Musculoskeletal                     Knee ROM    5-2-25 5-13-25 5-22-25 5-29-25 6-5-25 6-19-25 7-8-25    R L R R R R R R     Flex 80 95 92P 95P 95P 90A/100P 97A/104P 90A/95P     Ext (L3) 0 0           0                ,                 Knee   MMT (-/5) @eval 5-29-25 6-19-25 7-8-25 7-8-25    R L R R R L     hamstring 3+ 3+ 4- 4+ 4+      quad (L3) 4- 4- 4- 4 4    Hip abd     4- 4   Hip ER     4- 4-                           Balance and Functional Mobility:  Gait: pt ambulates on level ground with slow speed, straight cane, decr stance time RLE -- no change vs 6-19-25  (vs eval: trendelenburg/waddle; stooped posture/forward lean; decreased stance phase; decreased step length (walker)               Stairs: pt demo ascend 1:1 L lead and  descend 1:1 R lead today c min-mod A 1 railing and cane   (vs 6-19-25 PN: can ascend 1:1 either R or L lead c min-mod A 1 railing and straight cane ; c cueing she can perform reciprocally s pain, at very slow pace ; on descent pt prefers 1:1 R lead c min-mod A 1 railing and straight cane; she is able to perform reciprocal, but has poor eccentric quad control on R and is apprehensive c decr safety.)      Functional Mobility:  5x sit to stand test 6-19-25: 12.5 sec (vs eval: 14 sec w/ UE use)        Assessment   Pt returns today after 2.5 wk absence from PT. She had previously reported being quite sedentary at home, often not leaving her bedroom during the day. Encouraged pt to increase movement in the home throughout the day, which pt now states she has been doing more since her last PT visit. However, she continues to report swelling in BLE, though it does improve p elevation at home. Today she demo mild pitting in B lower legs proximal to ankles, and reports a 'heaviness' in RLE. She denies any pain in either leg. In past visits I have encouraged her to weigh herself regularly as a way to monitor her swelling, and have encouraged her to seek urgent care PRN, and/or communicate c her PCP regarding these changes. To this point she has not, but she states she has a followup with her surgeon today. Her exercise tolerance in recent weeks has been a challenge 2o fatigue and exertional SOB. Today she demonstrated SpO2% 90% at lowest reading and 94% at highest reading after 3-5 minutes rest and deep breathing. Pt would benefit from continued PT 2o deconditioning, but requires check-in c MD.     Goals (to be met in 16 visits)        Not Met Progress Toward Partially Met Met   Pt will demonstrate tolerating standing >30 minutes with minimal to no pain to be able to return to prior level of function and be able to perform household activities like cooking.  [x]  []  []  []    Pt will reporting walking >30 minutes with  straight cane with minimal to no pain to be able to ambulate safely within the community.  [x]  []  []  []    Pt will improve quad strength to 5/5 to ascend 1 flight of stairs reciprocally without UE assist. []  [x]  []  []    Pt will increase hip and knee strength to grossly 4+/5 to be able to get up and down from the floor safely. []  [x]  []  []    Pt will report participating in aquatics for >1 hour with minimal to no pain to be able to return to prior level of physical activity.  [x]  []  []  []    Pt will demonstrate ambulating with no assistive device use to be able to return to prior level of independence.  []  [x]  []  []    Pt will demonstrate 5x STS <12 seconds with no UE support to be able to safely perform sit to stand transfers at home, within the community and report decrease risk for falls.  []  [x]  []  []    Patient will demonstrate a score of 37 or higher in the LEFS outcome measure to report significant difference and low concern for condition. (Eval 28)    []  []  []  [x]    Pt will be independent and compliant with comprehensive HEP to maintain progress achieved in PT. []  []  []  []      Post LEFS Score  Post LEFS Score: (Patient-Rptd) 40 % (7/8/2025  7:38 AM)    11.25 % improvement    Plan: Continue skilled Physical Therapy 1-2 x/week or a total of 20 visits over a 90 day period. Treatment will include: progression of activity tolerance, functional strength/ endurance for walking /stairs.        Patient/Family/Caregiver was advised of these findings, precautions, and treatment options and has agreed to actively participate in planning and for this course of care.    Thank you for your referral. If you have any questions, please contact me at Dept: [phone].    Sincerely,  Electronically signed by therapist: Erinn Byrne PT     Physician's certification required:  Yes  Please co-sign or sign and return this letter via fax as soon as possible to [phone].   I certify the need for  these services furnished under this plan of treatment and while under my care.    X___________________________________________________ Date____________________    Certification From: 7/8/2025  To:10/6/2025     Treatment Last 4 Visits  Treatment Day: 14       6/10/2025 6/12/2025 6/19/2025 7/8/2025   LE Treatment   Therapeutic Exercise Nustep level 6, 8 minutes   Sit to stands 2x10   R LAQ 2# 5\" x2min  Clinic walk c S.C. 3min  6in forward step up 1UE support 1x10 R  6in lat step up 1UE support 1x10 R  Assessment, vitals, etc Clinic walk c S.C. 4min, 2min  R LAQ 2# 5\" x2min  Supine R SLR 2x10  S/L R hip abd 2x10  Sit to stands 2x10     6in forward step up 1UE support 1x10 R  6in lat step up 1UE support 1x10 R  Fwd step-and-return c BUE reach 1min  Lat step-and-return c BUE reach 1min  6in forward step up 1UE support 1x10 R  6in lat step up 1UE support 2x10 R   NuStep for CKC strength, ROM, CV 10min  R SAQ 2# 5\"x2min  Bridges x20  Clinic walk c S.C. 3min  Sit to stands 1x10   Up/down stairwell-1flight   reassessment   NuStep for CKC strength   Clinic walk c S.C. 1.5min stopped 2o SOB  Supine R SLR 2x10  S/L R hip abd 2x10  Up/down in stairwell x1 flight, amb 50 ft to/from  reassessment   Therapeutic Exercise Minutes 38 30 45 45   Total Time Of Timed Procedures 38 30 45 45   Total Time Of Service-Based Procedures 0 0 0 0   Total Treatment Time 38 30 45 45        HEP  Access Code: [email or web address]  URL: [email or web address]  Date: 05/02/2025  Prepared by: Iglesia Deng    Exercises  - Supine Quad Set  - 1 x daily - 7 x weekly - 3 sets - 10 reps  - Supine Heel Slide with Strap  - 1 x daily - 7 x weekly - 3 sets - 10 reps  - Seated Heel Slide  - 1 x daily - 7 x weekly - 3 sets - 10 reps  - Mini Squat with Counter Support  - 1 x daily - 7 x weekly - 3 sets - 10 reps  - Heel Raises with Counter Support  - 1 x daily - 7 x weekly - 3 sets - 10 reps  - Gastroc Stretch on Wall  - 1 x daily - 7 x weekly - 3 sets - 10  reps    Charges     3therex

## 2025-07-09 ENCOUNTER — LAB ENCOUNTER (OUTPATIENT)
Dept: LAB | Age: 75
End: 2025-07-09
Attending: FAMILY MEDICINE
Payer: MEDICARE

## 2025-07-09 ENCOUNTER — APPOINTMENT (OUTPATIENT)
Dept: ULTRASOUND IMAGING | Facility: HOSPITAL | Age: 75
End: 2025-07-09
Attending: FAMILY MEDICINE
Payer: MEDICARE

## 2025-07-09 ENCOUNTER — HOSPITAL ENCOUNTER (OUTPATIENT)
Dept: ULTRASOUND IMAGING | Facility: HOSPITAL | Age: 75
Discharge: HOME | End: 2025-07-09
Attending: FAMILY MEDICINE
Payer: MEDICARE

## 2025-07-09 ENCOUNTER — HOSPITAL ENCOUNTER (OUTPATIENT)
Dept: GENERAL RADIOLOGY | Age: 75
Discharge: HOME | End: 2025-07-09
Attending: FAMILY MEDICINE
Payer: MEDICARE

## 2025-07-09 ENCOUNTER — OFFICE VISIT (OUTPATIENT)
Dept: INTERNAL MEDICINE CLINIC | Facility: CLINIC | Age: 75
End: 2025-07-09
Payer: MEDICARE

## 2025-07-09 VITALS
SYSTOLIC BLOOD PRESSURE: 124 MMHG | BODY MASS INDEX: 42.81 KG/M2 | OXYGEN SATURATION: 95 % | HEIGHT: 59 IN | HEART RATE: 55 BPM | DIASTOLIC BLOOD PRESSURE: 78 MMHG | WEIGHT: 212.38 LBS | TEMPERATURE: 98 F

## 2025-07-09 DIAGNOSIS — R06.02 SOB (SHORTNESS OF BREATH) ON EXERTION: ICD-10-CM

## 2025-07-09 DIAGNOSIS — I82.4Y1 ACUTE DEEP VEIN THROMBOSIS (DVT) OF PROXIMAL VEIN OF RIGHT LOWER EXTREMITY (HCC): Primary | ICD-10-CM

## 2025-07-09 DIAGNOSIS — I15.2 HYPERTENSION ASSOCIATED WITH DIABETES (HCC): ICD-10-CM

## 2025-07-09 DIAGNOSIS — E11.59 HYPERTENSION ASSOCIATED WITH DIABETES (HCC): ICD-10-CM

## 2025-07-09 DIAGNOSIS — M79.89 LEFT LEG SWELLING: ICD-10-CM

## 2025-07-09 DIAGNOSIS — R74.01 ELEVATED AST (SGOT): ICD-10-CM

## 2025-07-09 DIAGNOSIS — I82.4Y1 ACUTE DEEP VEIN THROMBOSIS (DVT) OF PROXIMAL VEIN OF RIGHT LOWER EXTREMITY (HCC): ICD-10-CM

## 2025-07-09 DIAGNOSIS — R74.01 ELEVATED ALT MEASUREMENT: ICD-10-CM

## 2025-07-09 LAB
ALBUMIN SERPL-MCNC: 4.2 G/DL (ref 3.2–4.8)
ALBUMIN/GLOB SERPL: 1.1 {RATIO} (ref 1–2)
ALP LIVER SERPL-CCNC: 145 U/L (ref 55–142)
ALT SERPL-CCNC: 33 U/L (ref 10–49)
ANION GAP SERPL CALC-SCNC: 10 MMOL/L (ref 0–18)
AST SERPL-CCNC: 71 U/L (ref ?–34)
BASOPHILS # BLD AUTO: 0.03 X10(3) UL (ref 0–0.2)
BASOPHILS NFR BLD AUTO: 0.6 %
BILIRUB SERPL-MCNC: 0.9 MG/DL (ref 0.2–1.1)
BILIRUB UR QL STRIP.AUTO: NEGATIVE
BUN BLD-MCNC: 10 MG/DL (ref 9–23)
CALCIUM BLD-MCNC: 9.4 MG/DL (ref 8.7–10.6)
CHLORIDE SERPL-SCNC: 108 MMOL/L (ref 98–112)
CO2 SERPL-SCNC: 25 MMOL/L (ref 21–32)
COLOR UR AUTO: YELLOW
CREAT BLD-MCNC: 0.91 MG/DL (ref 0.55–1.02)
EGFRCR SERPLBLD CKD-EPI 2021: 67 ML/MIN/1.73M2 (ref 60–?)
EOSINOPHIL # BLD AUTO: 0.11 X10(3) UL (ref 0–0.7)
EOSINOPHIL NFR BLD AUTO: 2.1 %
ERYTHROCYTE [DISTWIDTH] IN BLOOD BY AUTOMATED COUNT: 19.3 %
FASTING STATUS PATIENT QL REPORTED: YES
GLOBULIN PLAS-MCNC: 3.8 G/DL (ref 2–3.5)
GLUCOSE BLD-MCNC: 77 MG/DL (ref 70–99)
GLUCOSE UR STRIP.AUTO-MCNC: NORMAL MG/DL
HCT VFR BLD AUTO: 31.6 % (ref 35–48)
HGB BLD-MCNC: 9.7 G/DL (ref 12–16)
IMM GRANULOCYTES # BLD AUTO: 0.02 X10(3) UL (ref 0–1)
IMM GRANULOCYTES NFR BLD: 0.4 %
KETONES UR STRIP.AUTO-MCNC: NEGATIVE MG/DL
LEUKOCYTE ESTERASE UR QL STRIP.AUTO: NEGATIVE
LYMPHOCYTES # BLD AUTO: 2.06 X10(3) UL (ref 1–4)
LYMPHOCYTES NFR BLD AUTO: 38.6 %
MCH RBC QN AUTO: 28.1 PG (ref 26–34)
MCHC RBC AUTO-ENTMCNC: 30.7 G/DL (ref 31–37)
MCV RBC AUTO: 91.6 FL (ref 80–100)
MONOCYTES # BLD AUTO: 0.67 X10(3) UL (ref 0.1–1)
MONOCYTES NFR BLD AUTO: 12.6 %
NEUTROPHILS # BLD AUTO: 2.44 X10 (3) UL (ref 1.5–7.7)
NEUTROPHILS # BLD AUTO: 2.44 X10(3) UL (ref 1.5–7.7)
NEUTROPHILS NFR BLD AUTO: 45.7 %
NITRITE UR QL STRIP.AUTO: NEGATIVE
OSMOLALITY SERPL CALC.SUM OF ELEC: 294 MOSM/KG (ref 275–295)
PH UR STRIP.AUTO: 7.5 [PH] (ref 5–8)
PLATELET # BLD AUTO: 177 10(3)UL (ref 150–450)
POTASSIUM SERPL-SCNC: 3.9 MMOL/L (ref 3.5–5.1)
PROT SERPL-MCNC: 8 G/DL (ref 5.7–8.2)
PROT UR STRIP.AUTO-MCNC: NEGATIVE MG/DL
RBC # BLD AUTO: 3.45 X10(6)UL (ref 3.8–5.3)
RBC UR QL AUTO: NEGATIVE
SODIUM SERPL-SCNC: 143 MMOL/L (ref 136–145)
SP GR UR STRIP.AUTO: 1.01 (ref 1–1.03)
UROBILINOGEN UR STRIP.AUTO-MCNC: 2 MG/DL
WBC # BLD AUTO: 5.3 X10(3) UL (ref 4–11)

## 2025-07-09 PROCEDURE — 3078F DIAST BP <80 MM HG: CPT | Performed by: FAMILY MEDICINE

## 2025-07-09 PROCEDURE — 3074F SYST BP LT 130 MM HG: CPT | Performed by: FAMILY MEDICINE

## 2025-07-09 PROCEDURE — 3008F BODY MASS INDEX DOCD: CPT | Performed by: FAMILY MEDICINE

## 2025-07-09 PROCEDURE — 93970 EXTREMITY STUDY: CPT | Performed by: FAMILY MEDICINE

## 2025-07-09 PROCEDURE — 99214 OFFICE O/P EST MOD 30 MIN: CPT | Performed by: FAMILY MEDICINE

## 2025-07-09 PROCEDURE — 1159F MED LIST DOCD IN RCRD: CPT | Performed by: FAMILY MEDICINE

## 2025-07-09 PROCEDURE — 81001 URINALYSIS AUTO W/SCOPE: CPT

## 2025-07-09 PROCEDURE — 71046 X-RAY EXAM CHEST 2 VIEWS: CPT | Performed by: FAMILY MEDICINE

## 2025-07-09 PROCEDURE — 3061F NEG MICROALBUMINURIA REV: CPT | Performed by: FAMILY MEDICINE

## 2025-07-09 PROCEDURE — 3044F HG A1C LEVEL LT 7.0%: CPT | Performed by: FAMILY MEDICINE

## 2025-07-09 PROCEDURE — 85025 COMPLETE CBC W/AUTO DIFF WBC: CPT

## 2025-07-09 PROCEDURE — 36415 COLL VENOUS BLD VENIPUNCTURE: CPT

## 2025-07-09 PROCEDURE — 80053 COMPREHEN METABOLIC PANEL: CPT

## 2025-07-09 RX ORDER — AMOXICILLIN 250 MG
1 CAPSULE ORAL DAILY
COMMUNITY
Start: 2025-05-13

## 2025-07-09 RX ORDER — TRAMADOL HYDROCHLORIDE 50 MG/1
50 TABLET ORAL AS DIRECTED
COMMUNITY
Start: 2025-05-02

## 2025-07-09 RX ORDER — MELOXICAM 7.5 MG/1
7.5 TABLET ORAL
COMMUNITY
Start: 2025-05-13

## 2025-07-09 NOTE — PROGRESS NOTES
The following individual(s) verbally consented to be recorded using ambient AI listening technology and understand that they can each withdraw their consent to this listening technology at any point by asking the clinician to turn off or pause the recording:    Patient name: Felicia Velez  Additional names:           History of Present Illness  Felicia Velez is a 74 year old female who presents with BL leg swelling.    She has been experiencing swelling in both legs, with the left leg more affected than the right as of late. The swelling in the left leg began approximately one month ago without any specific incident triggering it. The swelling is more pronounced when she is upright and decreases when she is lying down. Her face and arms appeared puffy last week, but the swelling has since reduced.    She had surgery in March, after which she developed a blood clot in her right leg, confirmed by an ultrasound. She was prescribed Eliquis and is currently on her third bottle, with about ten pills remaining. She has not had a previous history of blood clots before this incident and has not yet had a follow-up ultrasound to check the status of the clot.    She reports issues with urination, stating that she is not urinating as frequently as expected despite increased fluid intake, including water, pineapple juice, and apple juice. She mentions that she has been trying to drink more water but still experiences fluid retention.    She experiences shortness of breath when walking long distances, requiring her to pause and catch her breath. She uses a walker and a cane for mobility. She sleeps with three pillows but states this is not a new practice. No chest pain or frequent coughing.    Her balance is not yet fully recovered, and she continues with therapy to improve it. She mentions that her legs become hard and swollen after being upright for about thirty minutes.    Physical Exam  Vitals:    07/09/25  0821   BP: 124/78   Pulse: 55   Temp: 98 °F (36.7 °C)   TempSrc: Temporal   SpO2: 95%   Weight: 212 lb 6.4 oz (96.3 kg)   Height: 4' 11\" (1.499 m)         Body mass index is 42.9 kg/m².        GENERAL: well developed, well nourished,in no apparent distress  SKIN: no rashes  NECK: supple,no adenopathy  LUNGS: clear to auscultation  CARDIO: RRR without murmur  EXTREMITIES: 1+ pitting LLE    R seems less pitting but both legs are large girth d/t weight     Results  LABS  TSH: Normal (04/2025)    RADIOLOGY  Venous ultrasound: Positive for blood clot in the right leg (04/2025)    DIAGNOSTIC  Stress test: Normal (04/2023)    Assessment & Plan  Leg Swelling  Persistent bilateral leg swelling, left greater than right, with history of right leg DVT. Differential includes unresolved DVT, heart failure, or renal issues.    - Order venous ultrasound for both legs.  - Order blood and urine tests for renal function.  - Order echocardiogram.  - Order chest x-ray.    Shortness of Breath  Exertional dyspnea without chest pain or cough. Differential includes cardiac or pulmonary issues.  - Order echocardiogram.  - Order chest x-ray.

## 2025-07-10 ENCOUNTER — OFFICE VISIT (OUTPATIENT)
Dept: PHYSICAL THERAPY | Age: 75
End: 2025-07-10
Attending: FAMILY MEDICINE
Payer: MEDICARE

## 2025-07-10 DIAGNOSIS — Z98.890 H/O KNEE SURGERY: Primary | ICD-10-CM

## 2025-07-10 PROCEDURE — 97110 THERAPEUTIC EXERCISES: CPT

## 2025-07-10 NOTE — PROGRESS NOTES
Patient: Felicia Velez (74 year old, female) Referring Provider:  Insurance:   Diagnosis: H/O knee surgery (Z98.890) Cynthia MORFIN [facility]   Date of Surgery: 3/14/25 R TKA revision, R TKA 2013 Next MD visit:  N/A   Precautions:  None none scheduled (but will see surgeon ~7-6-25) Referral Information:    Date of Evaluation: Req: 16, Auth: 16, Exp: 7/29/2025 05/02/25 POC Auth Visits:  16       Today's Date   7/10/2025    Subjective  Pt states her chest XR showed scar tissue and further imaging is recommended. She had her LE ultrasound last night and is awaiting results.Her surgeon said her knee is looking good, he does not believe her LE swelling is due to her knee surgery; next f/u is in 6 months. Saw her PCP yesterday and is waiting to hear back from him regarding testing; he wants her to continue PT. Echocardiogram is scheduled in 1.5 wks. She feels her SOB is improving. Pt arrives 10' late today, accommodated.       Pain: 0/10     Objective       SpO2 98% , HR 83bpm   16 wks postop 7-4-25      Assessment   SpO2 improved today to 98%, vs last visit ranged 90-94%. Pt is following up with appropriate doctors and having recommended tests to investigate her LE swelling, known DVT and exertional SOB.     Goals (to be met in 16 visits)        Not Met Progress Toward Partially Met Met   Pt will demonstrate tolerating standing >30 minutes with minimal to no pain to be able to return to prior level of function and be able to perform household activities like cooking.  [x]  []  []  []    Pt will reporting walking >30 minutes with straight cane with minimal to no pain to be able to ambulate safely within the community.  [x]  []  []  []    Pt will improve quad strength to 5/5 to ascend 1 flight of stairs reciprocally without UE assist. []  [x]  []  []    Pt will increase hip and knee strength to grossly 4+/5 to be able to get up and down from the floor safely. []  [x]  []  []    Pt will report participating in  aquatics for >1 hour with minimal to no pain to be able to return to prior level of physical activity.  [x]  []  []  []    Pt will demonstrate ambulating with no assistive device use to be able to return to prior level of independence.  []  [x]  []  []    Pt will demonstrate 5x STS <12 seconds with no UE support to be able to safely perform sit to stand transfers at home, within the community and report decrease risk for falls.  []  [x]  []  []    Patient will demonstrate a score of 37 or higher in the LEFS outcome measure to report significant difference and low concern for condition. (Eval 28)    []  []  []  [x]    Pt will be independent and compliant with comprehensive HEP to maintain progress achieved in PT. []  []  []  []      Post LEFS Score  Post LEFS Score: (Patient-Rptd) 40 % (7/8/2025  7:38 AM)    11.25 % improvement    Plan: Continue skilled Physical Therapy 1-2 x/week or a total of 20 visits over a 90 day period. Treatment will include: progression of activity tolerance, functional strength/ endurance for walking /stairs.        Patient/Family/Caregiver was advised of these findings, precautions, and treatment options and has agreed to actively participate in planning and for this course of care.    Thank you for your referral. If you have any questions, please contact me at Dept: [phone].    Sincerely,  Electronically signed by therapist: Erinn Byrne, PT     Physician's certification required:  Yes  Please co-sign or sign and return this letter via fax as soon as possible to [phone].   I certify the need for these services furnished under this plan of treatment and while under my care.    X___________________________________________________ Date____________________    Certification From: 7/8/2025  To:10/6/2025   Plan   Continue to progress as rafael.    Treatment Last 4 Visits  Treatment Day: 15       6/12/2025 6/19/2025 7/8/2025 7/10/2025   LE Treatment   Therapeutic Exercise Clinic walk c  S.C. 4min, 2min  R LAQ 2# 5\" x2min  Supine R SLR 2x10  S/L R hip abd 2x10  Sit to stands 2x10     6in forward step up 1UE support 1x10 R  6in lat step up 1UE support 1x10 R  Fwd step-and-return c BUE reach 1min  Lat step-and-return c BUE reach 1min  6in forward step up 1UE support 1x10 R  6in lat step up 1UE support 2x10 R   NuStep for CKC strength, ROM, CV 10min  R SAQ 2# 5\"x2min  Bridges x20  Clinic walk c S.C. 3min  Sit to stands 1x10   Up/down stairwell-1flight   reassessment   NuStep for CKC strength   Clinic walk c S.C. 1.5min stopped 2o SOB  Supine R SLR 2x10  S/L R hip abd 2x10  Up/down in stairwell x1 flight, amb 50 ft to/from  reassessment NuStep for CKC strength 8min  Sit to stand 2x10  R LAQ 2# 5\" x2min  Supine R SLR 2x10  standing hip abd 2x10 ea R,L  standing hip ext 2x10 ea R,L  Clinic walk c S.C. 2min  Fwd step-and-return c BUE reach 1min  Lat step-and-return c BUE reach 1min   Therapeutic Exercise Minutes 30 45 45 35   Total Time Of Timed Procedures 30 45 45 35   Total Time Of Service-Based Procedures 0 0 0 0   Total Treatment Time 30 45 45 35        HEP  Access Code: [email or web address]  URL: [email or web address]  Date: 05/02/2025  Prepared by: Iglesia Deng    Exercises  - Supine Quad Set  - 1 x daily - 7 x weekly - 3 sets - 10 reps  - Supine Heel Slide with Strap  - 1 x daily - 7 x weekly - 3 sets - 10 reps  - Seated Heel Slide  - 1 x daily - 7 x weekly - 3 sets - 10 reps  - Mini Squat with Counter Support  - 1 x daily - 7 x weekly - 3 sets - 10 reps  - Heel Raises with Counter Support  - 1 x daily - 7 x weekly - 3 sets - 10 reps  - Gastroc Stretch on Wall  - 1 x daily - 7 x weekly - 3 sets - 10 reps    Charges     2therex

## 2025-07-11 DIAGNOSIS — E11.42 TYPE 2 DIABETES MELLITUS WITH DIABETIC POLYNEUROPATHY, WITHOUT LONG-TERM CURRENT USE OF INSULIN (HCC): ICD-10-CM

## 2025-07-11 RX ORDER — SPIRONOLACTONE 25 MG/1
25 TABLET ORAL DAILY
Qty: 90 TABLET | Refills: 0 | Status: SHIPPED | OUTPATIENT
Start: 2025-07-11

## 2025-07-11 RX ORDER — PRAVASTATIN SODIUM 20 MG
20 TABLET ORAL EVERY EVENING
Qty: 90 TABLET | Refills: 0 | Status: SHIPPED | OUTPATIENT
Start: 2025-07-11

## 2025-07-11 NOTE — TELEPHONE ENCOUNTER
Requesting   Name from pharmacy: PRAVASTATIN 20MG TABLETS          Will file in chart as: PRAVASTATIN 20 MG Oral Tab    Sig: TAKE 1 TABLET(20 MG) BY MOUTH EVERY EVENING    Disp: 90 tablet    Refills: 0 (Pharmacy requested: Not specified)    Start: 7/11/2025    Class: Normal    Non-formulary    Last ordered: 3 months ago (4/10/2025) by Cynthia Nelson MD    Last refill: 4/10/2025    Rx #: [id number]    Cholesterol Medication Protocol Hcdmip8307/11/2025 05:51 AM   Protocol Details ALT < 80    ALT resulted within past year    Lipid panel within past 12 months    In person appointment or virtual visit in the past 12 mos or appointment in next 3 mos    Medication is active on med list       Name from pharmacy: SPIRONOLACTONE 25MG TABLETS         Will file in chart as: SPIRONOLACTONE 25 MG Oral Tab    Sig: TAKE 1 TABLET(25 MG) BY MOUTH DAILY    Disp: 90 tablet    Refills: 0 (Pharmacy requested: Not specified)    Start: 7/11/2025    Class: Normal    Non-formulary    Last ordered: 3 months ago (4/10/2025) by Cynthia Nelson MD    Last refill: 4/10/2025    Rx #: [id number]    Hypertension Medications Protocol Nlnnxx3907/11/2025 05:51 AM   Protocol Details CMP or BMP in past 12 months    Last BP reading less than 140/90    In person appointment or virtual visit in the past 12 mos or appointment in next 3 mos    EGFRCR or GFRAA > 50    Medication is active on med list       Name from pharmacy: METFORMIN 1000MG TABLETS         Will file in chart as: METFORMIN HCL 1000 MG Oral Tab    Sig: TAKE 1 TABLET(1000 MG) BY MOUTH TWICE DAILY    Disp: 180 tablet    Refills: 0 (Pharmacy requested: Not specified)    Start: 7/11/2025    Class: Normal    Non-formulary For: Type 2 diabetes mellitus with diabetic polyneuropathy, without long-term current use of insulin (HCC)    Last ordered: 3 months ago (4/10/2025) by Cynthia Nelson MD    Last refill: 4/10/2025    Rx #: [id number]    Diabetes Medication Protocol Rmujzh5107/11/2025  05:51 AM   Protocol Details Last A1C < 7.5 and within past 6 months    In person appointment or virtual visit in the past 6 mos or appointment in next 3 mos    Microalbumin procedure in past 12 months or taking ACE/ARB    EGFRCR or GFRAA > 50    GFR in the past 12 months    Medication is active on med list        LOV: 7/9/2025  RTC: none noted   Last Relevant Labs: 4/9/2025  Filled: 4/10/2025 #90 day  with 0 refills    Future Appointments   Date Time Provider Department Center   7/15/2025  7:15 AM Erinn Byrne, PT [facility] PHYS TH [place]   7/17/2025  7:15 AM Erinn Byrne, PT  PHYS  [place]   7/17/2025  5:00 PM PF CT RM1 PF CT [place]   7/18/2025 10:45 AM [facility] CARD ECHO RM 1 ECARD ECHO [facility]   8/1/2025 10:00 AM Desiree Hu APRN [facility]IPL [facility] SUB GI

## 2025-07-15 ENCOUNTER — TELEPHONE (OUTPATIENT)
Dept: PHYSICAL THERAPY | Age: 75
End: 2025-07-15

## 2025-07-17 ENCOUNTER — OFFICE VISIT (OUTPATIENT)
Dept: PHYSICAL THERAPY | Age: 75
End: 2025-07-17
Attending: FAMILY MEDICINE
Payer: MEDICARE

## 2025-07-17 ENCOUNTER — HOSPITAL ENCOUNTER (OUTPATIENT)
Dept: CT IMAGING | Age: 75
Discharge: HOME | End: 2025-07-17
Attending: FAMILY MEDICINE
Payer: MEDICARE

## 2025-07-17 DIAGNOSIS — Z98.890 H/O KNEE SURGERY: Primary | ICD-10-CM

## 2025-07-17 DIAGNOSIS — R93.89 ABNORMAL FINDING ON IMAGING: ICD-10-CM

## 2025-07-17 PROCEDURE — 71250 CT THORAX DX C-: CPT | Performed by: FAMILY MEDICINE

## 2025-07-17 PROCEDURE — 97110 THERAPEUTIC EXERCISES: CPT

## 2025-07-17 NOTE — PROGRESS NOTES
Patient: Felicia Velez (74 year old, female) Referring Provider:  Insurance:   Diagnosis: H/O knee surgery (Z98.890) Cynthia Omar  CARYLHARRY [facility]   Date of Surgery: 3/14/25 R TKA revision, R TKA 2013 Next MD visit:  N/A   Precautions:  None none scheduled (but will see surgeon ~7-6-25) Referral Information:    Date of Evaluation: Req: 16, Auth: 16, Exp: 7/29/2025 05/02/25 POC Auth Visits:  16       Today's Date   7/17/2025    Subjective  Pt arrives 15' late, accommodated for shorter session. Pt states she is not having any pain today, just out of breath. The surgeon said she does not have to followup for another 6 months. She has been wearing her compression stockings, still has BLE swelling. Ultrasound negative for any further thrombus. Later today gets her chest CT to further investigate the scar tissue seen in her lungs on recent XR.       Pain: 0/10     Objective       Will be 18 wks postop 7-18-25    Pt can ambulate c straight cane c slow speed, step-to gait, limited duration  Pt can perform step-out-and-return ex's next to countertop and c SBA for safety    Post LEFS Score  Post LEFS Score: (Patient-Rptd) 40 % (7/8/2025  7:38 AM)    11.25 % improvement     Assessment   Pt returns today following various medical tests to reassess her LE swelling (-) and SOB (CT pending, scar tissue noted on XR). Her persistent LE swelling limits her knee flexion and comfort/stamina/quality of gait. She would continue to benefit from skilled PT to address these issues. She was told to wear BLE compression stockings, but presents without them today. She states she wears them only at home because they don't stay up well when she moves about; she will be ordering a new pair. HEP updated today and reviewed c pt in detail. Pt is to perform standing ex's at countertop for safety.  Pt advised consistent HEP is important to promote further progress, particularly as frequency of PT is decreasing at this time. Pt verbalizes  understanding.     Goals (to be met in 16 visits)        Not Met Progress Toward Partially Met Met   Pt will demonstrate tolerating standing >30 minutes with minimal to no pain to be able to return to prior level of function and be able to perform household activities like cooking.  [x]  []  []  []    Pt will reporting walking >30 minutes with straight cane with minimal to no pain to be able to ambulate safely within the community.  [x]  []  []  []    Pt will improve quad strength to 5/5 to ascend 1 flight of stairs reciprocally without UE assist. []  [x]  []  []    Pt will increase hip and knee strength to grossly 4+/5 to be able to get up and down from the floor safely. []  [x]  []  []    Pt will report participating in aquatics for >1 hour with minimal to no pain to be able to return to prior level of physical activity.  [x]  []  []  []    Pt will demonstrate ambulating with no assistive device use to be able to return to prior level of independence.  []  [x]  []  []    Pt will demonstrate 5x STS <12 seconds with no UE support to be able to safely perform sit to stand transfers at home, within the community and report decrease risk for falls.  []  [x]  []  []    Patient will demonstrate a score of 37 or higher in the LEFS outcome measure to report significant difference and low concern for condition. (Eval 28)    []  []  []  [x]    Pt will be independent and compliant with comprehensive HEP to maintain progress achieved in PT. []  []  []  []       Plan   Continue to progress LLE strength and balance as tolerated x1/wk for 4 additional visits, or 20 total visits to continue to progress functional strength, quality of gait, and balance.    Treatment Last 4 Visits  Treatment Day: 16       6/19/2025 7/8/2025 7/10/2025 7/17/2025   LE Treatment   Therapeutic Exercise NuStep for CKC strength, ROM, CV 10min  R SAQ 2# 5\"x2min  Bridges x20  Clinic walk c S.C. 3min  Sit to stands 1x10   Up/down stairwell-1flight    reassessment   NuStep for CKC strength   Clinic walk c S.C. 1.5min stopped 2o SOB  Supine R SLR 2x10  S/L R hip abd 2x10  Up/down in stairwell x1 flight, amb 50 ft to/from  reassessment NuStep for CKC strength 8min  Sit to stand 2x10  R LAQ 2# 5\" x2min  Supine R SLR 2x10  standing hip abd 2x10 ea R,L  standing hip ext 2x10 ea R,L  Clinic walk c S.C. 2min  Fwd step-and-return c BUE reach 1min  Lat step-and-return c BUE reach 1min NuStep for CKC strength 13min  Sit to stand 2x10  R LAQ 3# 5\" x2min  Amb in clinic c S.C. 2min  Standing R knee flexion on 8\" step 5\"x10  Alt Fwd step-and-return c BUE reach x10  Alt Lat step-and-return c BUE reach x10  HEP review/revise/reissue   Therapeutic Exercise Minutes 45 45 35 38   Total Time Of Timed Procedures 45 45 35 38   Total Time Of Service-Based Procedures 0 0 0 0   Total Treatment Time 45 45 35 38   HEP    Access Code: [email or web address]  URL: [email or web address]  Date: 07/17/2025  Prepared by: Erinn Byrne    Exercises  - Standing Knee Flexion Stretch on Step  - 2 x daily - 2 sets - 10 reps  - Seated Long Arc Quad  - 1 x daily - 30 reps - 5 hold  - Sit to Stand Without Arm Support  - 1 x daily - 2 sets - 10 reps  - Standing Hip Abduction with Counter Support  - 1 x daily - 2 sets - 10 reps  - Mini Squat with Counter Support  - 1 x daily - 2 sets - 10 reps  - Heel Raises with Counter Support  - 1 x daily - 2 sets - 10 reps  - Step Forward with Arms Reaching to Sides  - 1 x daily - 2 sets - 10 reps  - Step Sideways with Arms Reaching  - 1 x daily - 2 sets - 10 reps  - Standing March with Counter Support  - 1 x daily - 2 sets - 10 reps        HEP  Access Code: [email or web address]  URL: [email or web address]  Date: 07/17/2025  Prepared by: Erinn Byrne    Exercises  - Standing Knee Flexion Stretch on Step  - 2 x daily - 2 sets - 10 reps  - Seated Long Arc Quad  - 1 x daily - 30 reps - 5 hold  - Sit to Stand Without Arm Support  - 1 x daily - 2 sets - 10  reps  - Standing Hip Abduction with Counter Support  - 1 x daily - 2 sets - 10 reps  - Mini Squat with Counter Support  - 1 x daily - 2 sets - 10 reps  - Heel Raises with Counter Support  - 1 x daily - 2 sets - 10 reps  - Step Forward with Arms Reaching to Sides  - 1 x daily - 2 sets - 10 reps  - Step Sideways with Arms Reaching  - 1 x daily - 2 sets - 10 reps  - Standing March with Counter Support  - 1 x daily - 2 sets - 10 reps    Charges     3therex

## 2025-07-18 ENCOUNTER — HOSPITAL ENCOUNTER (OUTPATIENT)
Dept: CV DIAGNOSTICS | Facility: HOSPITAL | Age: 75
Discharge: HOME | End: 2025-07-18
Attending: FAMILY MEDICINE
Payer: MEDICARE

## 2025-07-18 DIAGNOSIS — R06.02 SOB (SHORTNESS OF BREATH) ON EXERTION: ICD-10-CM

## 2025-07-18 PROCEDURE — 93306 TTE W/DOPPLER COMPLETE: CPT | Performed by: FAMILY MEDICINE

## 2025-07-23 ENCOUNTER — OFFICE VISIT (OUTPATIENT)
Dept: PHYSICAL THERAPY | Age: 75
End: 2025-07-23
Attending: FAMILY MEDICINE
Payer: MEDICARE

## 2025-07-23 PROCEDURE — 97112 NEUROMUSCULAR REEDUCATION: CPT

## 2025-07-23 PROCEDURE — 97110 THERAPEUTIC EXERCISES: CPT

## 2025-07-31 ENCOUNTER — OFFICE VISIT (OUTPATIENT)
Dept: PHYSICAL THERAPY | Age: 75
End: 2025-07-31
Attending: FAMILY MEDICINE
Payer: MEDICARE

## 2025-07-31 DIAGNOSIS — Z98.890 H/O KNEE SURGERY: Primary | ICD-10-CM

## 2025-07-31 PROCEDURE — 97110 THERAPEUTIC EXERCISES: CPT

## 2025-07-31 PROCEDURE — 97112 NEUROMUSCULAR REEDUCATION: CPT

## 2025-08-01 ENCOUNTER — OFFICE VISIT (OUTPATIENT)
Dept: INTERNAL MEDICINE CLINIC | Facility: CLINIC | Age: 75
End: 2025-08-01

## 2025-08-01 VITALS
SYSTOLIC BLOOD PRESSURE: 124 MMHG | HEART RATE: 74 BPM | OXYGEN SATURATION: 96 % | BODY MASS INDEX: 42.74 KG/M2 | WEIGHT: 212 LBS | RESPIRATION RATE: 20 BRPM | DIASTOLIC BLOOD PRESSURE: 76 MMHG | HEIGHT: 59 IN

## 2025-08-01 DIAGNOSIS — R60.0 LOWER EXTREMITY EDEMA: Primary | ICD-10-CM

## 2025-08-01 PROCEDURE — 3008F BODY MASS INDEX DOCD: CPT | Performed by: FAMILY MEDICINE

## 2025-08-01 PROCEDURE — 99213 OFFICE O/P EST LOW 20 MIN: CPT | Performed by: FAMILY MEDICINE

## 2025-08-01 PROCEDURE — 3074F SYST BP LT 130 MM HG: CPT | Performed by: FAMILY MEDICINE

## 2025-08-01 PROCEDURE — 3078F DIAST BP <80 MM HG: CPT | Performed by: FAMILY MEDICINE

## 2025-08-01 PROCEDURE — 1159F MED LIST DOCD IN RCRD: CPT | Performed by: FAMILY MEDICINE

## 2025-08-01 RX ORDER — FUROSEMIDE 20 MG/1
20 TABLET ORAL DAILY
Qty: 30 TABLET | Refills: 0 | Status: SHIPPED | OUTPATIENT
Start: 2025-08-01

## 2025-08-07 ENCOUNTER — TELEPHONE (OUTPATIENT)
Dept: INTERNAL MEDICINE CLINIC | Facility: CLINIC | Age: 75
End: 2025-08-07

## 2025-08-07 ENCOUNTER — OFFICE VISIT (OUTPATIENT)
Dept: PHYSICAL THERAPY | Age: 75
End: 2025-08-07
Attending: FAMILY MEDICINE

## 2025-08-07 PROCEDURE — 97110 THERAPEUTIC EXERCISES: CPT

## 2025-08-10 DIAGNOSIS — M25.561 ACUTE PAIN OF RIGHT KNEE: ICD-10-CM

## 2025-08-12 RX ORDER — MELOXICAM 7.5 MG/1
7.5 TABLET ORAL DAILY
Qty: 30 TABLET | Refills: 1 | OUTPATIENT
Start: 2025-08-12

## 2025-08-13 ENCOUNTER — OFFICE VISIT (OUTPATIENT)
Dept: PHYSICAL THERAPY | Age: 75
End: 2025-08-13
Attending: FAMILY MEDICINE

## 2025-08-13 PROCEDURE — 97110 THERAPEUTIC EXERCISES: CPT

## 2025-08-21 ENCOUNTER — APPOINTMENT (OUTPATIENT)
Dept: PHYSICAL THERAPY | Age: 75
End: 2025-08-21
Attending: FAMILY MEDICINE

## 2025-09-01 RX ORDER — LIDOCAINE HYDROCHLORIDE 20 MG/ML
1 SOLUTION ORAL; TOPICAL DAILY
Qty: 90 TABLET | Refills: 0 | Status: SHIPPED | OUTPATIENT
Start: 2025-09-01

## (undated) DIAGNOSIS — Z00.00 ROUTINE GENERAL MEDICAL EXAMINATION AT A HEALTH CARE FACILITY: ICD-10-CM

## (undated) DIAGNOSIS — E11.42 TYPE 2 DIABETES MELLITUS WITH DIABETIC POLYNEUROPATHY, WITHOUT LONG-TERM CURRENT USE OF INSULIN (HCC): Primary | ICD-10-CM

## (undated) DIAGNOSIS — K29.70 GASTRITIS AND GASTRODUODENITIS: ICD-10-CM

## (undated) DIAGNOSIS — I10 ESSENTIAL HYPERTENSION, BENIGN: Primary | ICD-10-CM

## (undated) DIAGNOSIS — K29.90 GASTRITIS AND GASTRODUODENITIS: ICD-10-CM

## (undated) DIAGNOSIS — H25.813 MIXED TYPE AGE-RELATED CATARACT, BOTH EYES: ICD-10-CM

## (undated) DIAGNOSIS — K21.9 GASTROESOPHAGEAL REFLUX DISEASE WITHOUT ESOPHAGITIS: ICD-10-CM

## (undated) DIAGNOSIS — E11.42 TYPE 2 DIABETES MELLITUS WITH DIABETIC POLYNEUROPATHY, WITHOUT LONG-TERM CURRENT USE OF INSULIN (HCC): ICD-10-CM

## (undated) DIAGNOSIS — H35.3130 BILATERAL NONEXUDATIVE AGE-RELATED MACULAR DEGENERATION, UNSPECIFIED STAGE: ICD-10-CM

## (undated) DIAGNOSIS — E78.00 PURE HYPERCHOLESTEROLEMIA: ICD-10-CM

## (undated) DIAGNOSIS — H40.003 PREGLAUCOMA, BILATERAL: ICD-10-CM

## (undated) DEVICE — PAIN TRAY: Brand: MEDLINE INDUSTRIES, INC.

## (undated) DEVICE — SKIN REG/FINE DUAL MARKER, RULER, LABELS: Brand: MEDLINE

## (undated) DEVICE — NEEDLE SPNL 22GA L3.5IN BLK QNCKE STYL DISP

## (undated) DEVICE — GLV SURG SZ 7.5 THK10MIL WHT ISOLEX SYN

## (undated) DEVICE — Device: Brand: DEFENDO AIR/WATER/SUCTION AND BIOPSY VALVE

## (undated) DEVICE — GLOVE,SURG,SENSICARE,ALOE,LF,PF,7: Brand: MEDLINE

## (undated) DEVICE — 3M™ RED DOT™ MONITORING ELECTRODE WITH FOAM TAPE AND STICKY GEL, 50/BAG, 20/CASE, 72/PLT 2570: Brand: RED DOT™

## (undated) DEVICE — FILTERLINE NASAL ADULT O2/CO2

## (undated) DEVICE — BANDAGE ADH W1INXL3IN NAT FAB WVN N ADH PD

## (undated) DEVICE — FORCEP BIOPSY RJ4 LG CAP W/ND

## (undated) DEVICE — NEEDLE SPNL 22GA L3.5IN BLK HUB SS RW FIT

## (undated) DEVICE — REMOVER LOT 4OZ N IRRIG UNSCNT SFT MOIST LIQ

## (undated) DEVICE — ENDOSCOPY PACK - LOWER: Brand: MEDLINE INDUSTRIES, INC.

## (undated) DEVICE — REMOVER LOT 4OZ NONIRRITATING UNSCNT SFT

## (undated) DEVICE — GLOVE SUR 7.5 SENSICARE PIP WHT PWD F

## (undated) DEVICE — 1200CC GUARDIAN II: Brand: GUARDIAN

## (undated) DEVICE — FLUIDGARD® 160 ANTI-FOG SURGICAL MASK WITH ANTI-GLARE SHIELD: Brand: PRECEPT ®

## (undated) NOTE — MR AVS SNAPSHOT
[facility] [address] [address] [phone]               Thank you for choosing us for your health care visit with Coni Vera, RN,CDE.   We are glad to serve you and happy to provide you with this summary Commonly known as:  AMARYL           MetFORMIN HCl 1000 MG Tabs   TAKE 1 TABLET(1000 MG) BY MOUTH TWICE DAILY WITH MEALS   Commonly known as:  GLUCOPHAGE           Nortriptyline HCl 25 MG Caps   TAKE 1 CAPSULE BY MOUTH EVERY NIGHT AT BEDTIME   Commonly kno Visit [facility] online at  [email or web address]

## (undated) NOTE — MR AVS SNAPSHOT
[facility],[address], [address]  [phone]               Thank you for choosing us for your health care visit with Ofe Maki NP.   We are glad to serve you and happy to provide you with this Liraglutide 18 MG/3ML Sopn   Inject under the skin 0.6 mg daily for 2 weeks and then go to 1.2 mg daily for 2 weeks   Commonly known as:  VICTOZA           MetFORMIN HCl 1000 MG Tabs   TAKE 1 TABLET(1000 MG) BY MOUTH TWICE DAILY WITH MEALS   Commonly k [employer] will allow you to access patient instructions from your recent visit,  view other health information, and more. To sign up or find more information, go to [email or web address] and click on the Sign Up Now link in the [employer] box.      Enter

## (undated) NOTE — Clinical Note
BG stable had eye exam, to recheck labs in 1 month, will see you for PE in a few months will keep you up to date

## (undated) NOTE — MR AVS SNAPSHOT
[facility],[address], [address] [place] [address]  [phone]               Thank you for choosing us for your health care visit with John Bedolla NP.   We are glad to serve you and happy to provide you with this 120/64 mmHg 84 97.9 °F (36.6 °C) (Oral) 59\" 274 lb 55.31 kg/m2    Breastfeeding?                    No              Current Medications          This list is accurate as of: 6/12/17  1:00 PM.  Always use your most recent med list.                Acetamino TAKE 1 TABLET BY MOUTH EVERY NIGHT AT BEDTIME   Commonly known as:  AMBIEN                Where to Get Your Medications      These medications were sent to [address], [address] [place] [address], [phone], Eat plenty of protein, keep the fat content low Sugars:  sodas and sports drinks, candies and desserts   Eat plenty of low-fat dairy products High fat meats and dairy   Choose whole grain products Foods high in sodium   Water is best for hydration Fast iram

## (undated) NOTE — LETTER
01/04/19        [address] [address]      Dear Fer Kennedy records indicate that you have outstanding lab work and or testing that was ordered for you and has not yet been completed:  Orders Placed This Enc

## (undated) NOTE — LETTER
03/07/25    To Whom It May Concern:    Felicia Velez is a patient of Dr. Cynthia Nelson. She is a Diabetes Mellitus 2 patient who is well-controlled.    Regards,      Dr. Cynthia Nelson MD

## (undated) NOTE — MR AVS SNAPSHOT
[facility],[address], [address] [place] [address] Rd  [phone]               Thank you for choosing us for your health care visit with Klaudia Martell NP.   We are glad to serve you and happy to provide you with this Take 1 tablet (1 mg total) by mouth 2 (two) times daily before meals.    Commonly known as:  AMARYL           MetFORMIN HCl 1000 MG Tabs   TAKE 1 TABLET(1000 MG) BY MOUTH TWICE DAILY WITH MEALS   Commonly known as:  GLUCOPHAGE           Nortriptyline HCl 25 monitoring, nutrition, medication management, exercise).  Individual if class is not available    Note:   Medicare covers 10 hours initial DSMT (Diabetes Self-Management Training) in a 12-month period for the first year of diagnosis, plus 2 hours follow-up OP REFERRAL INADEQUATE GLYCEMIC CONTROL/CHANGE TREATMENT 3 HRS [97648]     1 Authorized [1] Type 2 diabetes mellitus with diabetic neuropathy, unspecified long term insulin use status ([facility] [0053454]           Need for tetanus booster [858941]      Shardahar

## (undated) NOTE — Clinical Note
Thank you for referring Pedro Luis Campoverde for weight loss consultation at our clinic. I appreciate the opportunity to participate in her care.   Please find the enclosed  encounter summary for today's visit.  Should you have any questions or concerns, please do not h

## (undated) NOTE — LETTER
August 28, 2017    [address] [place] [address]  S[place]a Lemon [address]    Dear Corey Lin: It was a pleasure speaking with you over the phone recently.  To follow up, I wanted to send you some contact information to utilize when you ha Phone: [phone]. muu

## (undated) NOTE — MR AVS SNAPSHOT
[place]  [address]  [address] [address] [address] [phone]               Thank you for choosing us for your health care visit with Trino Stovall MD.  We are glad to serve you and happy to provide you with this sum covered, or covered at this frequency, by your insurer. Please check with your insurance carrier before scheduling to verify coverage.    PREVENTATIVE SERVICES  INDICATIONS AND SCHEDULE Internal Lab or Procedure External Lab or Procedure   Diabetes Screenin No results found for this or any previous visit.  Limited to patients who meet one of the following criteria:   • Men who are 73-68 years old and have smoked more than 100 cigarettes in their lifetime   • Anyone with a family history    Colorectal Cancer Sc Recommend Annually to at least age 76, and as needed after 76 Mammogram,1 Yr due on 06/18/2017 Please get this Mammogram regularly   Immunizations      Influenza  Covered Annually   Orders placed or performed in visit on 11/28/16  -FLU VAC NO PRSV 4 ADILENE 3 An information packet, including necessary form from the [employer] [facility] website. http://www. idph.state. [place]/public/books/advin.htm  A link to the [employer].  This site has a lot of good information including definit omeprazole 20 MG Cpdr   TAKE 1 CAPSULE(20 MG) BY MOUTH DAILY   Commonly known as:  PRILOSEC           ONETOUCH DELICA LANCETS 26G Misc   USE 1 LANCET BY FINGER STICK ROUTE DAILY           ONETOUCH ULTRA BLUE Strp   Generic drug:  Glucose Blood   Test chito BATHROOM:  ? Install grab bars on the bathroom walls beside the tub, shower and toilet. ? Use a non skid rubber mat in the tub/shower.   ? If you are unsteady on your feet you may want to use a shower chair/bench and a hand held shower head while bathing/s

## (undated) NOTE — LETTER
OSR/DAVID Notification    To: DR Jourdan Carnes Date:  2/24/2022  Fax #: [phone]    Patient Name: Geno Gamboa / Sex: 7/30/1950-A: 80 y  female  Phone:  [phone]  CSN: [id number]      Medical Records: [id number]    Surgeon(s):  Surgeon(s):  Rose Clay DO   Procedure: COLONOSCOPY   ESOPHAGOGASTRODUODENOSCOPY    Anesthesia Type: MAC Surgery Date: 3/1/2022    During the pre-operative screening process using the STOP-Bang questionnaire, the patient listed above was identified as being at high risk for obstructive sleep apnea. If you feel it is appropriate to schedule a sleep study, the [facility] will give priority to patients scheduled for procedures to minimize surgical delays. If a sleep study is completed prior to surgery, please fax the results/plan of care to Gia Jaimes ([phone]) as this information will be utilized in clinical decision-making regarding the patient's upcoming surgery. Thank you for your assistance in helping us provide a safe, seamless and personal experience for your patient.     Al Hicks MD, PhD  [facility], Department of Anesthesia  [facility], Sleep Apnea Task Force    Rev 2/12/14

## (undated) NOTE — Clinical Note
Dear Dr Bradford ,  I saw Nadir Vargas today for weight loss follow, she had lost 12 pounds since last month and her HBa1c gotten better she is happy about this! She does have anemia on her recent blood work, low normal vitamin b12 I gave her IM vitamin b12 today.

## (undated) NOTE — LETTER
12/14/17  WEIGHT LOSS PROGRAM CONSENT FORM      I, ________________________________, authorize Dr. Piyush Tijerina and associated health care providers, to help me in my weight-reduction efforts.  I understand that my program may consist of a balanced-deficit successful. I also understand that obesity is a chronic, lifelong condition that may require changes in eating habits and permanent changes in behavior to be treated successfully.     I have read and fully understand this consent form and it has been fully

## (undated) NOTE — MR AVS SNAPSHOT
[facility],[address], [address]  [phone]               Thank you for choosing us for your health care visit with Luis Miguel Eason NP.   We are glad to serve you and happy to provide you with this Allergies as of Mar 13, 2017     Zithromax Rash, Swelling    TABS    Shellfish Itching, Swelling                Today's Vital Signs     BP Pulse Temp Height Weight BMI    118/72 mmHg 88 97.7 °F (36.5 °C) (Oral) 60\" 268 lb 52.34 kg/m2    Breastfeeding? Vitamin B-12 1000 MCG Tabs   Take 1 tablet (1,000 mcg total) by mouth daily.    Commonly known as:  VITAMIN B12           Zolpidem Tartrate 5 MG Tabs   TAKE 1 TABLET BY MOUTH AT BEDTIME   Commonly known as:  AMBIEN                Where to [address] [place] [address] Avenue

## (undated) NOTE — Clinical Note
Patient reported on ROS, intermittent chest pain. Currently chest pain free. I had ordered her a baseline stress test she had never completed. I changed the order to stat and she is to go tmrw.  I advised if she has any chest pain in the interim to contact

## (undated) NOTE — LETTER
October 5, 2017    [address] [place] [address]  [place] [id number]    Dear Osman Morrow: It was a pleasure speaking with you over the phone recently.  To follow up, I wanted to send you some contact information to utilize when you ha Phone: [phone]. muq

## (undated) NOTE — Clinical Note
a1c hovering went up after she said it was getting better,added sglt2 to help with BG and weight all other metrix met A1C 8.1%

## (undated) NOTE — LETTER
12/14/17  RULES FOR USE OF ANTI-OBESITY CONTROL MEDICATIONS    NOTE: SIGNING THIS FORM DOES NOT GUARANTEE THAT YOUR PROVIDER(S) AT [facility] WILL FIND YOU TO BE AN APPROPRIATE CANDIDATE FOR ANTI-OBESITY MEDICATIONS, BUT ONLY THAT YOU [facility] to notify area pharmacies of the terms of this agreement. I will not share, sell, or trade my medication with anyone. I understand that doing so is illegal and will result in my discharge from the care from our clinic.

## (undated) NOTE — LETTER
10/16/21        [address] [place] [address]      Dear Ally Jarrett records indicate that you have outstanding lab work and or testing that was ordered for you and has not yet been completed:  Orders Placed This Enc

## (undated) NOTE — Clinical Note
A1C went up from 7.2 to 7.9 she stopped one of her meds and testing, agrees to get back on meds and return in 4 weeks otherwise doing ok.  Thanks

## (undated) NOTE — Clinical Note
Thank you for referring Angelica Mina for weight loss consultation at our clinic. I appreciate the opportunity to participate in her care.   Please find the enclosed  encounter summary for today's visit.  Should you have any questions or concerns, please do not h